# Patient Record
Sex: FEMALE | Race: WHITE | NOT HISPANIC OR LATINO | Employment: OTHER | URBAN - METROPOLITAN AREA
[De-identification: names, ages, dates, MRNs, and addresses within clinical notes are randomized per-mention and may not be internally consistent; named-entity substitution may affect disease eponyms.]

---

## 2017-01-11 ENCOUNTER — OFFICE VISIT (OUTPATIENT)
Dept: NEUROSURGERY | Facility: CLINIC | Age: 57
End: 2017-01-11

## 2017-01-11 VITALS
DIASTOLIC BLOOD PRESSURE: 80 MMHG | SYSTOLIC BLOOD PRESSURE: 138 MMHG | TEMPERATURE: 97.6 F | HEART RATE: 88 BPM | BODY MASS INDEX: 25.84 KG/M2 | WEIGHT: 165 LBS

## 2017-01-11 DIAGNOSIS — M54.50 CHRONIC BILATERAL LOW BACK PAIN WITHOUT SCIATICA: ICD-10-CM

## 2017-01-11 DIAGNOSIS — M47.814 THORACIC SPONDYLOSIS WITHOUT MYELOPATHY: ICD-10-CM

## 2017-01-11 DIAGNOSIS — M54.2 CERVICALGIA: Primary | ICD-10-CM

## 2017-01-11 DIAGNOSIS — M47.812 CERVICAL SPONDYLOSIS WITHOUT MYELOPATHY: ICD-10-CM

## 2017-01-11 DIAGNOSIS — M54.6 PAIN IN THORACIC SPINE: ICD-10-CM

## 2017-01-11 DIAGNOSIS — G89.29 CHRONIC BILATERAL LOW BACK PAIN WITHOUT SCIATICA: ICD-10-CM

## 2017-01-11 DIAGNOSIS — M47.817 LUMBOSACRAL SPONDYLOSIS WITHOUT MYELOPATHY: ICD-10-CM

## 2017-01-11 DIAGNOSIS — M54.31 SCIATICA OF RIGHT SIDE: ICD-10-CM

## 2017-01-11 PROCEDURE — 99213 OFFICE O/P EST LOW 20 MIN: CPT | Performed by: NEUROLOGICAL SURGERY

## 2017-01-11 NOTE — LETTER
2017     Martinez Cameron MD  [address] [facility] [address]  [facility] [address]    Patient: Huma Montano   YOB: 1960   Date of Visit: 2017       Dear Dr. Rakesh MD:    Huma Montano was in my office today. Below is a copy of my note.    If you have questions, please do not hesitate to call me. I look forward to following Huma along with you.         Sincerely,        Connor Lopez MD        CC: No Recipients    Patient: Huma Montano  :  1960  Chart #:  [id number]    Date of Service: 17    Chief Complaint:   Chief Complaint   Patient presents with   • Back Pain       Back Pain   Chronicity: Patient returns today for a follow-up on her back pain. The current episode started more than 1 month ago. The problem occurs intermittently. The problem has been gradually improving since onset. The pain is present in the lumbar spine (She states that her sciatica pain has increased.). Quality: She has good saggital balance.  She states she keeps muscle spasms in her back. The pain does not radiate (She has occasional neck pain.  It use to be quite intense, however it has improved.). The pain is at a severity of 6/10. The pain is mild (Her pain is mild to moderate.). The pain is worse during the day. The symptoms are aggravated by bending (If she sits or stands for too long, her pain increases.). Stiffness is present in the morning. Associated symptoms include numbness. She has tried analgesics, NSAIDs and walking (She has been taking Gabapentin at bedtime.  She has been exercising at home.) for the symptoms. The treatment provided mild relief.     Her neck pain is about the same;  She is having more low back and R sciatic pain.  She continues meloxicam, gabapentin, tizanidine, and tramadol.  She is working restricted hours (32/week) which has helped.    Radiographic Images:   None recently    Past Medical History   Diagnosis Date   • Back pain    • Graves  disease      Current Outpatient Prescriptions   Medication Sig Dispense Refill   • amphetamine-dextroamphetamine XR (ADDERALL XR) 25 MG 24 hr capsule Take 25 mg by mouth every morning.     • aspirin 81 MG EC tablet Take 1 tablet by mouth daily. 30 tablet 0   • estradiol (CLIMARA) 0.075 MG/24HR patch Place 1 patch on the skin 1 (one) time per week.     • gabapentin (NEURONTIN) 300 MG capsule Take 1 capsule by mouth 3 (Three) Times a Day. 90 capsule 3   • levothyroxine (SYNTHROID, LEVOTHROID) 112 MCG tablet Take 112 mcg by mouth daily.     • meloxicam (MOBIC) 15 MG tablet Take 1 tablet by mouth Daily. With food 30 tablet 3   • morphine (MSIR) 15 MG tablet Take 15 mg by mouth Every 4 (Four) Hours As Needed for severe pain (7-10).     • tiZANidine (ZANAFLEX) 4 MG tablet Take 4 mg by mouth at night as needed for muscle spasms.     • traMADol (ULTRAM) 50 MG tablet Take 50 mg by mouth every 6 (six) hours as needed for moderate pain (4-6).       No current facility-administered medications for this visit.      Social History     Social History   • Marital status:      Spouse name: N/A   • Number of children: N/A   • Years of education: N/A     Social History Main Topics   • Smoking status: Never Smoker   • Smokeless tobacco: Not on file   • Alcohol use No   • Drug use: No   • Sexual activity: Not on file     Other Topics Concern   • Not on file     Social History Narrative     No family history on file.  Past Surgical History   Procedure Laterality Date   • Hernia repair     • Rotator cuff repair       3x   • Cyst removal       Breast   • Cyst removal       Cervical cyst   • Septoplasty     • Spinal fusion     • X-stop implantation     • Breast biopsy Right 01/2016     Review of Systems   Respiratory: Positive for chest tightness.    Musculoskeletal: Positive for arthralgias, back pain, gait problem, myalgias, neck pain and neck stiffness.   Allergic/Immunologic: Positive for environmental allergies.   Neurological:  Positive for numbness.   All other systems reviewed and are negative.    Vitals:    01/11/17 0808   BP: 138/80   Pulse: 88   Temp: 97.6 °F (36.4 °C)     Physical Exam  Neurologic Exam  Constitutional: She is oriented to person, place, and time. Vital signs are normal. She appears well-developed and well-nourished. No distress.   Neat healthy female   Neck: Trachea normal and normal range of motion. No thyroid mass present.   Minimal neck stiffness; No Spurling's or L'Hermitte's signs; Shoulder ROM normal   Musculoskeletal:   Thoracic back: She exhibits pain. She exhibits no tenderness and no deformity.   Lumbar back: She exhibits pain. She exhibits normal range of motion, no deformity and no spasm.   Healed lumbar incision; Mild stiffness; SLR negative; Hip ROM normal    Mental Status   Oriented to person, place, and time.   Attention: normal. Concentration: normal.   Speech: speech is normal   Level of consciousness: alert  Knowledge: good and consistent with education.   Normal comprehension.       Cranial Nerves   Cranial nerves II through XII intact.       Motor Exam   Muscle bulk: normal  Overall muscle tone: normal      Strength   Strength 5/5 throughout.       Sensory Exam   Light touch normal.   Proprioception normal.       Gait, Coordination, and Reflexes       Gait  Gait: normal    Tremor   Resting tremor: absent  Intention tremor: absent  Action tremor: absent      Reflexes   Right biceps: 1+  Left biceps: 1+  Right triceps: 1+  Left triceps: 1+  Right patellar: 2+  Left patellar: 2+  Right achilles: 1+  Left achilles: 1+  Right Scherer: absent  Left Scherer: absent  Right ankle clonus: absent  Left ankle clonus: absent  LATASHA normal; Right handed      Huma was seen today for back pain.    Diagnoses and all orders for this visit:    Cervicalgia  -     MRI Lumbar Spine Without Contrast; Future    Pain in thoracic spine  -     MRI Lumbar Spine Without Contrast; Future    Cervical spondylosis without  myelopathy  -     MRI Lumbar Spine Without Contrast; Future    Thoracic spondylosis without myelopathy  -     MRI Lumbar Spine Without Contrast; Future    Chronic bilateral low back pain without sciatica  -     MRI Lumbar Spine Without Contrast; Future    Lumbosacral spondylosis without myelopathy  -     MRI Lumbar Spine Without Contrast; Future    Sciatica of right side  -     MRI Lumbar Spine Without Contrast; Future      Plan: order lumbar spine MRI without contrast;  Continue current medications;  Continue work hour restrictions;  Monitor sympptoms for review at next visit after MRI.    Connor Lopez MD

## 2017-01-11 NOTE — PROGRESS NOTES
Patient: Huma Montano  :  1960  Chart #:  [id number]    Date of Service: 17    Chief Complaint:   Chief Complaint   Patient presents with   • Back Pain       Back Pain   Chronicity: Patient returns today for a follow-up on her back pain. The current episode started more than 1 month ago. The problem occurs intermittently. The problem has been gradually improving since onset. The pain is present in the lumbar spine (She states that her sciatica pain has increased.). Quality: She has good saggital balance.  She states she keeps muscle spasms in her back. The pain does not radiate (She has occasional neck pain.  It use to be quite intense, however it has improved.). The pain is at a severity of 6/10. The pain is mild (Her pain is mild to moderate.). The pain is worse during the day. The symptoms are aggravated by bending (If she sits or stands for too long, her pain increases.). Stiffness is present in the morning. Associated symptoms include numbness. She has tried analgesics, NSAIDs and walking (She has been taking Gabapentin at bedtime.  She has been exercising at home.) for the symptoms. The treatment provided mild relief.     Her neck pain is about the same;  She is having more low back and R sciatic pain.  She continues meloxicam, gabapentin, tizanidine, and tramadol.  She is working restricted hours (32/week) which has helped.    Radiographic Images:   None recently    Past Medical History   Diagnosis Date   • Back pain    • Graves disease      Current Outpatient Prescriptions   Medication Sig Dispense Refill   • amphetamine-dextroamphetamine XR (ADDERALL XR) 25 MG 24 hr capsule Take 25 mg by mouth every morning.     • aspirin 81 MG EC tablet Take 1 tablet by mouth daily. 30 tablet 0   • estradiol (CLIMARA) 0.075 MG/24HR patch Place 1 patch on the skin 1 (one) time per week.     • gabapentin (NEURONTIN) 300 MG capsule Take 1 capsule by mouth 3 (Three) Times a Day. 90 capsule 3   • levothyroxine  (SYNTHROID, LEVOTHROID) 112 MCG tablet Take 112 mcg by mouth daily.     • meloxicam (MOBIC) 15 MG tablet Take 1 tablet by mouth Daily. With food 30 tablet 3   • morphine (MSIR) 15 MG tablet Take 15 mg by mouth Every 4 (Four) Hours As Needed for severe pain (7-10).     • tiZANidine (ZANAFLEX) 4 MG tablet Take 4 mg by mouth at night as needed for muscle spasms.     • traMADol (ULTRAM) 50 MG tablet Take 50 mg by mouth every 6 (six) hours as needed for moderate pain (4-6).       No current facility-administered medications for this visit.      Social History     Social History   • Marital status:      Spouse name: N/A   • Number of children: N/A   • Years of education: N/A     Social History Main Topics   • Smoking status: Never Smoker   • Smokeless tobacco: Not on file   • Alcohol use No   • Drug use: No   • Sexual activity: Not on file     Other Topics Concern   • Not on file     Social History Narrative     No family history on file.  Past Surgical History   Procedure Laterality Date   • Hernia repair     • Rotator cuff repair       3x   • Cyst removal       Breast   • Cyst removal       Cervical cyst   • Septoplasty     • Spinal fusion     • X-stop implantation     • Breast biopsy Right 01/2016     Review of Systems   Respiratory: Positive for chest tightness.    Musculoskeletal: Positive for arthralgias, back pain, gait problem, myalgias, neck pain and neck stiffness.   Allergic/Immunologic: Positive for environmental allergies.   Neurological: Positive for numbness.   All other systems reviewed and are negative.    Vitals:    01/11/17 0808   BP: 138/80   Pulse: 88   Temp: 97.6 °F (36.4 °C)     Physical Exam  Neurologic Exam  Constitutional: She is oriented to person, place, and time. Vital signs are normal. She appears well-developed and well-nourished. No distress.   Neat healthy female   Neck: Trachea normal and normal range of motion. No thyroid mass present.   Minimal neck stiffness; No Spurling's or  L'Hermitte's signs; Shoulder ROM normal   Musculoskeletal:   Thoracic back: She exhibits pain. She exhibits no tenderness and no deformity.   Lumbar back: She exhibits pain. She exhibits normal range of motion, no deformity and no spasm.   Healed lumbar incision; Mild stiffness; SLR negative; Hip ROM normal    Mental Status   Oriented to person, place, and time.   Attention: normal. Concentration: normal.   Speech: speech is normal   Level of consciousness: alert  Knowledge: good and consistent with education.   Normal comprehension.       Cranial Nerves   Cranial nerves II through XII intact.       Motor Exam   Muscle bulk: normal  Overall muscle tone: normal      Strength   Strength 5/5 throughout.       Sensory Exam   Light touch normal.   Proprioception normal.       Gait, Coordination, and Reflexes       Gait  Gait: normal    Tremor   Resting tremor: absent  Intention tremor: absent  Action tremor: absent      Reflexes   Right biceps: 1+  Left biceps: 1+  Right triceps: 1+  Left triceps: 1+  Right patellar: 2+  Left patellar: 2+  Right achilles: 1+  Left achilles: 1+  Right Scherer: absent  Left Scherer: absent  Right ankle clonus: absent  Left ankle clonus: absent  LATASHA normal; Right handed      Huma was seen today for back pain.    Diagnoses and all orders for this visit:    Cervicalgia  -     MRI Lumbar Spine Without Contrast; Future    Pain in thoracic spine  -     MRI Lumbar Spine Without Contrast; Future    Cervical spondylosis without myelopathy  -     MRI Lumbar Spine Without Contrast; Future    Thoracic spondylosis without myelopathy  -     MRI Lumbar Spine Without Contrast; Future    Chronic bilateral low back pain without sciatica  -     MRI Lumbar Spine Without Contrast; Future    Lumbosacral spondylosis without myelopathy  -     MRI Lumbar Spine Without Contrast; Future    Sciatica of right side  -     MRI Lumbar Spine Without Contrast; Future      Plan: order lumbar spine MRI without contrast;   Continue current medications;  Continue work hour restrictions;  Monitor sympptoms for review at next visit after MRI.    Connor Lopez MD

## 2017-01-11 NOTE — MR AVS SNAPSHOT
Huma Montano   1/11/2017 8:00 AM   Office Visit    Dept Phone:  [phone]   Encounter #:  [id number]    Provider:  Connor Lopez MD   Department:  [facility]                Your Full Care Plan              Your Updated Medication List          This list is accurate as of: 1/11/17  8:24 AM.  Always use your most recent med list.                ADDERALL XR 25 MG 24 hr capsule   Generic drug:  amphetamine-dextroamphetamine XR       aspirin 81 MG EC tablet   Take 1 tablet by mouth daily.       estradiol 0.075 MG/24HR patch   Commonly known as:  CLIMARA       gabapentin 300 MG capsule   Commonly known as:  NEURONTIN   Take 1 capsule by mouth 3 (Three) Times a Day.       levothyroxine 112 MCG tablet   Commonly known as:  SYNTHROID, LEVOTHROID       meloxicam 15 MG tablet   Commonly known as:  MOBIC   Take 1 tablet by mouth Daily. With food       Morphine 15 MG tablet   Commonly known as:  MSIR       tiZANidine 4 MG tablet   Commonly known as:  ZANAFLEX       traMADol 50 MG tablet   Commonly known as:  ULTRAM               You Were Diagnosed With        Codes Comments    Cervicalgia    -  Primary ICD-10-CM: M54.2  ICD-9-CM: 723.1     Pain in thoracic spine     ICD-10-CM: M54.6  ICD-9-CM: 724.1     Cervical spondylosis without myelopathy     ICD-10-CM: M47.812  ICD-9-CM: 721.0     Thoracic spondylosis without myelopathy     ICD-10-CM: M47.814  ICD-9-CM: 721.2     Chronic bilateral low back pain without sciatica     ICD-10-CM: M54.5, G89.29  ICD-9-CM: 724.2, 338.29     Lumbosacral spondylosis without myelopathy     ICD-10-CM: M47.817  ICD-9-CM: 721.3     Sciatica of right side     ICD-10-CM: M54.31  ICD-9-CM: 724.3       Instructions     None    Patient Instructions History      Upcoming Appointments     Visit Type Date Time Department    OFFICE VISIT 1/11/2017  8:00 AM [facility]    OFFICE VISIT 2/10/2017  8:40 AM [facility]         [employer] Signup     Our records indicate that you have an active [facility] [employer] account.    You can view your After Visit Summary by going to [employer] and logging in with your [employer]ing [employer] username and password.  If you don't have a [employer] username and password but a parent or guardian has access to your record, the parent or guardian should login with their own [employer] username and password and access your record to view the After Visit Summary.    If you have questions, you can email [email or web address] or call [phone] to talk to our [employer] staff.  Remember, T[employer]ing T[employer] is NOT to be used for urgent needs.  For medical emergencies, dial 911.               Other Info from Your Visit           Your Appointments     Feb 10, 2017  8:40 AM EST   Office Visit with Connor Lopez MD   [facility] (--)    [address] ,  [address] [address] [phone]           Arrive 15 minutes prior to appointment.              Allergies     Erythromycin      Lortab [Hydrocodone-acetaminophen]        Reason for Visit     Back Pain           Vital Signs     Blood Pressure Pulse Temperature Weight Body Mass Index Smoking Status    138/80 (BP Location: Right arm) 88 97.6 °F (36.4 °C) (Temporal Artery ) 165 lb (74.8 kg) 25.84 kg/m2 Never Smoker      Problems and Diagnoses Noted     Neck pain    -  Primary    Pain in thoracic spine        Degenerative arthritis of cervical spine        Arthritis of spine        Chronic bilateral low back pain without sciatica        Arthritis of low back        Right sided sciatica

## 2017-02-10 ENCOUNTER — OFFICE VISIT (OUTPATIENT)
Dept: NEUROSURGERY | Facility: CLINIC | Age: 57
End: 2017-02-10

## 2017-02-10 VITALS
TEMPERATURE: 97.8 F | WEIGHT: 170 LBS | HEIGHT: 66 IN | BODY MASS INDEX: 27.32 KG/M2 | SYSTOLIC BLOOD PRESSURE: 126 MMHG | DIASTOLIC BLOOD PRESSURE: 84 MMHG

## 2017-02-10 DIAGNOSIS — M54.2 CERVICALGIA: Primary | ICD-10-CM

## 2017-02-10 DIAGNOSIS — G89.29 CHRONIC BILATERAL LOW BACK PAIN WITH RIGHT-SIDED SCIATICA: ICD-10-CM

## 2017-02-10 DIAGNOSIS — M54.31 SCIATICA OF RIGHT SIDE: ICD-10-CM

## 2017-02-10 DIAGNOSIS — M41.20 SCOLIOSIS (AND KYPHOSCOLIOSIS), IDIOPATHIC: ICD-10-CM

## 2017-02-10 DIAGNOSIS — M47.812 CERVICAL SPONDYLOSIS WITHOUT MYELOPATHY: ICD-10-CM

## 2017-02-10 DIAGNOSIS — M47.814 THORACIC SPONDYLOSIS WITHOUT MYELOPATHY: ICD-10-CM

## 2017-02-10 DIAGNOSIS — M54.6 PAIN IN THORACIC SPINE: ICD-10-CM

## 2017-02-10 DIAGNOSIS — M47.817 LUMBOSACRAL SPONDYLOSIS WITHOUT MYELOPATHY: ICD-10-CM

## 2017-02-10 DIAGNOSIS — M54.41 CHRONIC BILATERAL LOW BACK PAIN WITH RIGHT-SIDED SCIATICA: ICD-10-CM

## 2017-02-10 PROCEDURE — 99214 OFFICE O/P EST MOD 30 MIN: CPT | Performed by: NEUROLOGICAL SURGERY

## 2017-02-10 NOTE — PROGRESS NOTES
Patient: Huma Montano  :  1960  Chart #:  [id number]    Date of Service: 02/10/17    Chief Complaint:   Chief Complaint   Patient presents with   • Back Pain     MRI       Back Pain   Chronicity: Patient returns today for a follow-up on her back pain. Episode onset: In  she underwent a interbody fusion with cages at L5-S1. The problem occurs intermittently (She continues to have muscle spasms in her back.). The problem has been gradually improving since onset. The pain is present in the lumbar spine (She has incresase sciatica radha.). Radiates to: She has numbness and tingling in her metatarsals. The pain is at a severity of 6/10. The pain is mild (Her pain is mild to moderate.). The pain is worse during the day (She states that it is difficult to get her morning started because of her back pain.). The symptoms are aggravated by twisting and standing. Stiffness is present in the morning. Associated symptoms include chest pain, numbness and weakness. She has tried ice, heat, home exercises and bed rest for the symptoms. The treatment provided moderate relief.     She is worse with low back pain especially on R side and R sciatica;  There are no other new symptoms or complaints since last visit on 17.    Radiographic Images:   Lumbar MRI done 17 was reviewed with the patient:  She has a transitional L5 vertebra;  There is lateral recess stenosis at L5S1 more on R than L.  There is scoliosis due to degenerative discs at L2L3 and L3L4.  She has interbody fusion at L4L5.      Past Medical History   Diagnosis Date   • Back pain    • Graves disease      Current Outpatient Prescriptions   Medication Sig Dispense Refill   • amphetamine-dextroamphetamine XR (ADDERALL XR) 25 MG 24 hr capsule Take 25 mg by mouth every morning.     • aspirin 81 MG EC tablet Take 1 tablet by mouth daily. 30 tablet 0   • gabapentin (NEURONTIN) 300 MG capsule Take 1 capsule by mouth 3 (Three) Times a Day. 90 capsule 3   •  levothyroxine (SYNTHROID, LEVOTHROID) 112 MCG tablet Take 112 mcg by mouth daily.     • meloxicam (MOBIC) 15 MG tablet Take 1 tablet by mouth Daily. With food 30 tablet 3   • morphine (MSIR) 15 MG tablet Take 15 mg by mouth Every 4 (Four) Hours As Needed for severe pain (7-10).     • tiZANidine (ZANAFLEX) 4 MG tablet Take 4 mg by mouth at night as needed for muscle spasms.     • traMADol (ULTRAM) 50 MG tablet Take 50 mg by mouth every 6 (six) hours as needed for moderate pain (4-6).     • estradiol (CLIMARA) 0.075 MG/24HR patch Place 1 patch on the skin 1 (one) time per week.       No current facility-administered medications for this visit.      Social History     Social History   • Marital status:      Spouse name: N/A   • Number of children: N/A   • Years of education: N/A     Social History Main Topics   • Smoking status: Never Smoker   • Smokeless tobacco: None   • Alcohol use No   • Drug use: No   • Sexual activity: Not Asked     Other Topics Concern   • None     Social History Narrative     History reviewed. No pertinent family history.  Past Surgical History   Procedure Laterality Date   • Hernia repair     • Rotator cuff repair       3x   • Cyst removal       Breast   • Cyst removal       Cervical cyst   • Septoplasty     • Spinal fusion     • X-stop implantation     • Breast biopsy Right 01/2016     Review of Systems   Constitutional: Positive for activity change and fatigue.   HENT: Positive for congestion.    Respiratory: Positive for chest tightness.    Cardiovascular: Positive for chest pain.   Musculoskeletal: Positive for arthralgias, back pain, myalgias, neck pain and neck stiffness.   Allergic/Immunologic: Positive for environmental allergies.   Neurological: Positive for weakness and numbness.   All other systems reviewed and are negative.    Vitals:    02/10/17 0826   BP: 126/84   BP Location: Right arm   Patient Position: Sitting   Cuff Size: Adult   Temp: 97.8 °F (36.6 °C)   TempSrc:  "Temporal Artery    Weight: 170 lb (77.1 kg)   Height: 66\" (167.6 cm)     Physical Exam  Neurologic Exam  Constitutional: She is oriented to person, place, and time. Vital signs are normal. She appears well-developed and well-nourished. No distress.   Neat healthy female   Neck: Trachea normal and normal range of motion. No thyroid mass present.   Minimal neck stiffness; No Spurling's or L'Hermitte's signs; Shoulder ROM normal   Musculoskeletal:   Thoracic back: She exhibits pain. She exhibits no tenderness and no deformity.   Lumbar back: She exhibits pain. She exhibits normal range of motion, no deformity and no spasm.   Healed lumbar incision; Mild stiffness; SLR negative; Hip ROM normal    Mental Status   Oriented to person, place, and time.   Attention: normal. Concentration: normal.   Speech: speech is normal   Level of consciousness: alert  Knowledge: good and consistent with education.   Normal comprehension.       Cranial Nerves   Cranial nerves II through XII intact.       Motor Exam   Muscle bulk: normal  Overall muscle tone: normal      Strength   Strength 5/5 throughout.       Sensory Exam   Light touch normal.   Proprioception normal.       Gait, Coordination, and Reflexes       Gait  Gait: normal     Tremor   Resting tremor: absent  Intention tremor: absent  Action tremor: absent      Reflexes   Right biceps: 1+  Left biceps: 1+  Right triceps: 1+  Left triceps: 1+  Right patellar: 2+  Left patellar: 2+  Right achilles: 1+  Left achilles: 1+  Right Scherer: absent  Left Scherer: absent  Right ankle clonus: absent  Left ankle clonus: absent  LATASHA normal; Right handed       Huma was seen today for back pain.    Diagnoses and all orders for this visit:    Cervicalgia    Chronic bilateral low back pain with right-sided sciatica    Pain in thoracic spine    Lumbosacral spondylosis without myelopathy    Cervical spondylosis without myelopathy    Sciatica of right side    Thoracic spondylosis without " myelopathy    Scoliosis (and kyphoscoliosis), idiopathic        Plan: I recommend reconstructive surgery to correct the degenerative scoliosis and relieve nerve root compression.  I recommend L L2L3 and R L3L4 TLIFs, L5S1 laminectomy and L2-S1 posterior fusion with pedicle screw fixation.      I described the operative procedure in detail as well as the indications, alternatives, and expected postoperative course and results. I described the potential risks and complications of surgery in detail. All questions were answered. The patient has indicated understanding of the planned procedure, accepted the risks, and wishes to proceed with surgery. No guarantee of results was given to the patient. The operative permit will be completed prior to surgery.    The patient's imaging CD's are on the chart.      Connor Lopez MD

## 2017-03-06 RX ORDER — MELOXICAM 15 MG/1
TABLET ORAL
Qty: 30 TABLET | Refills: 2 | Status: SHIPPED | OUTPATIENT
Start: 2017-03-06 | End: 2017-03-26

## 2017-03-06 NOTE — TELEPHONE ENCOUNTER
Provider:  John  Caller:   interface  Phone #:  automated  Surgery:  n/a  Surgery Date:  N/a  Last visit:   2/10/17    NIKOLE:         Reason for call:       Automated refill request

## 2017-03-10 ENCOUNTER — PREP FOR SURGERY (OUTPATIENT)
Dept: NEUROSURGERY | Facility: CLINIC | Age: 57
End: 2017-03-10

## 2017-03-10 DIAGNOSIS — M41.20 SCOLIOSIS (AND KYPHOSCOLIOSIS), IDIOPATHIC: Primary | ICD-10-CM

## 2017-03-21 ENCOUNTER — TELEPHONE (OUTPATIENT)
Dept: NEUROSURGERY | Facility: CLINIC | Age: 57
End: 2017-03-21

## 2017-03-21 NOTE — TELEPHONE ENCOUNTER
Provider:  John  Caller: Huma Montano  Time of call:   1:42  Phone #:  [phone]  Surgery:  l5-S1 sp osteotomy and tlif, l3-4  l2-3 tlif, l2-s1 posterior fusion and psf  Surgery Date:  Scheduled for 04/04/17  Last visit:   02/10/17    Reason for call:           ----- Message from Emma Gomez sent at 3/21/2017  1:42 PM EDT -----  Contact: [phone]    PATIENT CALLING REQUESTING A NOTE FOR HER EMPLOYER AS SHE IS UNABLE TO WORK ANY FURTHER.  PATIENT IS SCHEDULED FOR SURGERY 4/4/17.  SHE NEEDS THE NOTE TO BE EFFECTIVE AS OF TODAY.    SEND TO LIANA DE LEÓN - [phone] FAX NUMBER.

## 2017-03-21 NOTE — TELEPHONE ENCOUNTER
Called pt back, first fax was unsuccessful so I re-faxed, failed again. Pt gave me the fax number, it was slightly different from the original message: [phone]     Adv pt that I would re-fax tonight. She asked that I also mail her a copy of the letter, verified address and adv that I would mail her a copy as well, pt understood, no further questions

## 2017-03-26 ENCOUNTER — APPOINTMENT (OUTPATIENT)
Dept: PREADMISSION TESTING | Facility: HOSPITAL | Age: 57
End: 2017-03-26

## 2017-03-26 VITALS — WEIGHT: 169.75 LBS | HEIGHT: 67 IN | BODY MASS INDEX: 26.64 KG/M2

## 2017-03-26 DIAGNOSIS — M41.20 SCOLIOSIS (AND KYPHOSCOLIOSIS), IDIOPATHIC: ICD-10-CM

## 2017-03-26 LAB
DEPRECATED RDW RBC AUTO: 43.2 FL (ref 37–54)
ERYTHROCYTE [DISTWIDTH] IN BLOOD BY AUTOMATED COUNT: 12.2 % (ref 11.3–14.5)
GLUCOSE BLD-MCNC: 94 MG/DL (ref 70–100)
HBA1C MFR BLD: 5.9 % (ref 4.8–5.6)
HCT VFR BLD AUTO: 44.7 % (ref 34.5–44)
HGB BLD-MCNC: 15 G/DL (ref 11.5–15.5)
MCH RBC QN AUTO: 32.9 PG (ref 27–31)
MCHC RBC AUTO-ENTMCNC: 33.6 G/DL (ref 32–36)
MCV RBC AUTO: 98 FL (ref 80–99)
PLATELET # BLD AUTO: 291 10*3/MM3 (ref 150–450)
PMV BLD AUTO: 9.7 FL (ref 6–12)
RBC # BLD AUTO: 4.56 10*6/MM3 (ref 3.89–5.14)
WBC NRBC COR # BLD: 6.22 10*3/MM3 (ref 3.5–10.8)

## 2017-03-26 PROCEDURE — 85027 COMPLETE CBC AUTOMATED: CPT | Performed by: NEUROLOGICAL SURGERY

## 2017-03-26 PROCEDURE — 82947 ASSAY GLUCOSE BLOOD QUANT: CPT | Performed by: ANESTHESIOLOGY

## 2017-03-26 PROCEDURE — 87081 CULTURE SCREEN ONLY: CPT | Performed by: NEUROLOGICAL SURGERY

## 2017-03-26 PROCEDURE — 36415 COLL VENOUS BLD VENIPUNCTURE: CPT

## 2017-03-26 PROCEDURE — 87147 CULTURE TYPE IMMUNOLOGIC: CPT | Performed by: NEUROLOGICAL SURGERY

## 2017-03-26 PROCEDURE — 83036 HEMOGLOBIN GLYCOSYLATED A1C: CPT | Performed by: NEUROLOGICAL SURGERY

## 2017-03-26 RX ORDER — ESTRADIOL 0.05 MG/D
1 FILM, EXTENDED RELEASE TRANSDERMAL 2 TIMES WEEKLY
COMMUNITY
End: 2018-02-20 | Stop reason: CLARIF

## 2017-03-26 RX ORDER — DULOXETIN HYDROCHLORIDE 60 MG/1
60 CAPSULE, DELAYED RELEASE ORAL DAILY
COMMUNITY
End: 2017-09-13

## 2017-03-26 RX ORDER — ONDANSETRON 4 MG/1
4 TABLET, FILM COATED ORAL EVERY 4 HOURS PRN
COMMUNITY
End: 2022-08-10 | Stop reason: SDUPTHER

## 2017-03-26 RX ORDER — MELOXICAM 15 MG/1
15 TABLET ORAL DAILY
COMMUNITY
End: 2018-02-20 | Stop reason: SDUPTHER

## 2017-03-26 NOTE — DISCHARGE INSTRUCTIONS
The following information and instructions were given:    NPO after MN except sips of water with routine prescribed medication (except blood thinner, diabetes, or weight reducing medication) unless otherwise instructed by your physician.  Do not eat, drink, smoke or chew gum after MN the night before surgery. This also includes no mints.    DO NOT shave, wear makeup or dark nail polish.    Remove all jewelry (advised to go to jeweler if unable to remove).    Leave anything you consider valuable at home.    Leave your suitcase in the car until after your surgery.    Bring the following with you (if applicable)   -picture ID and insurance cards   -Co-pay/deductible required by insurance   -Medications in the original bottles (not a list) including all over-the-counter  medications if not brought to PAT   -Copy of advance directive, living will or power of  documents if not  brought to PAT   -CPAP or BIPAP mask and tubing (do not bring machine)   -Skin prep instructions sheet   -PAT Pass   Education booklet, brochure, handout or binder given to patient.    Pain Control After Surgery handout given to patient.    Respirex use (handout given to patient) and pneumonia prevention.    Signs and Symptoms of infection.    DVT Prevention stressing the importance of ambulation.    Patient to apply Chlorhexadine wipes to surgical area (as instructed) the night before procedure and the AM of procedure.        Bactroban and Chlorhexidine Prescription given during PAT visit, as well as written and verbal instructions given to patient during PAT visit.

## 2017-03-28 LAB — MRSA SPEC QL CULT: ABNORMAL

## 2017-03-28 RX ORDER — VANCOMYCIN HYDROCHLORIDE
15 ONCE
Status: DISCONTINUED | OUTPATIENT
Start: 2017-03-28 | End: 2017-04-04 | Stop reason: SDUPTHER

## 2017-03-29 ENCOUNTER — TELEPHONE (OUTPATIENT)
Dept: NEUROSURGERY | Facility: CLINIC | Age: 57
End: 2017-03-29

## 2017-04-03 NOTE — H&P
CHIEF COMPLAINT: Low back and leg pain.     HISTORY OF PRESENT ILLNESS: This 56-year-old woman has a long history of back problems. Currently, she is having severe lower back pain with radiation into the right leg. She has been using meloxicam, gabapentin, tizanidine, and tramadol. 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 She has previously had an L4-L5 posterior fusion for degenerative spondylolisthesis. She has had extensive physical therapy and conservative treatment. She has gradually worsened.     Imaging studies of the spine show a transitional L5 vertebra. There is lateral recess stenosis at L5-S1, more on the right than the left, and a scoliosis with disk degeneration at L2-L3 and L3-L4. She has had an interbody fusion at L4-L5.     PAST MEDICAL HISTORY:  1.  Degenerative osteoarthritis and back pain.  2.  Graves' disease.     MEDICATIONS:  1.  Adderall 25 mg daily.   2.  Aspirin 81 mg daily.   3.  Neurontin 300 mg 3 daily.   4.  Synthroid 112 mcg daily.   5.  Meloxicam 15 mg daily.   6.  MS Contin 15 mg.   7.  Tizanidine 4 mg p.r.n.   8.  Tramadol 50 mg q.6 h. p.r.n.   9.  Estradiol patch.     ALLERGIES:  1.  ERYTHROMYCIN.  2.  HYDROCODONE.     PAST SURGICAL HISTORY:  1.  Hernia repair.   2.  Rotator cuff repair in the shoulder.    3.  Breast cyst removal.    4.  Cervical cyst removal.    5.  Septoplasty.    6.  L4-L5 posterior lumbar fusion.    7.  X-Stop implantation.   8.  Breast biopsy.     SOCIAL HISTORY: The patient is . She has never smoked cigarettes. She denies alcohol or illicit drug use.     FAMILY HISTORY: There is no history of hereditary or congenital disease.     REVIEW OF SYSTEMS: The patient denies a history of recent chest pain or heart attack, other heart disease, hypertension, stroke, seizures, cancer, chronic pulmonary disease, diabetes mellitus, liver, kidney, stomach, or ulcer disease, psychiatric disease, or a history of bleeding disorder.      PHYSICAL EXAMINATION:  VITAL SIGNS: The patient is 5 feet 6 inches tall and weighs 170 pounds. Temperature is 97.8°F and blood pressure is 126/84.   GENERAL: The patient is a neat, healthy female, and in mild distress due to pain.   HEENT: Unremarkable.   NECK:  Normal range of motion. There was minimal neck stiffness. There was no Spurling or Lhermitte sign. Shoulder range of motion was normal.   CHEST: Symmetric with normal expansion on inspiration.   LUNGS: Clear to auscultation in all fields.   HEART: Regular rhythm with normal S1 and S2 sounds. No murmur was detected.   ABDOMEN: Soft and nontender. Bowel sounds are present.   BACK: Normal range of motion in the lumbar region. She had a healed lumbar incision. She had mild to moderate stiffness.   NEUROLOGIC: Mental status, speech, and language are normal. Cranial nerves II-XII are intact. Motor testing showed normal strength and tone in upper and lower extremities. Sensation to light touch and joint position were normal. The patient's gait was normal. Balance and coordination were intact. Deep tendon reflexes were diminished at the biceps and triceps, normal at the knees, and diminished at the ankles. There were no pathologic reflexes noted.   EXTREMITIES: There was no atrophy or edema noted. Straight leg raising was negative. Hip range of motion was normal.     IMPRESSION:   1.  Degenerative lumbar scoliosis.   2.  Spinal stenosis with intractable back and leg pain.     PLAN: The patient is admitted for a Rios-Clement osteotomy at L5-S1 with interbody fusions at L2-L3, L3-L4, L5-S1, and L2 to S1 posterior lateral fusion with pedicle screw fixation in an attempt to correct deformity, stabilize the spine, decompress nerve roots, and relieve back and leg pain.         Connor Lopez MD  BAS/mildred  DD: 04/03/2017 07:05:06  DT: 04/03/2017 08:36:19  Voice Rec. ID #[id number]  Voice Original ID #[id number]  Doc ID #[id number]  Rev. #0  cc:

## 2017-04-04 ENCOUNTER — ANESTHESIA (OUTPATIENT)
Dept: PERIOP | Facility: HOSPITAL | Age: 57
End: 2017-04-04

## 2017-04-04 ENCOUNTER — APPOINTMENT (OUTPATIENT)
Dept: GENERAL RADIOLOGY | Facility: HOSPITAL | Age: 57
End: 2017-04-04

## 2017-04-04 ENCOUNTER — HOSPITAL ENCOUNTER (INPATIENT)
Facility: HOSPITAL | Age: 57
LOS: 4 days | Discharge: HOME | End: 2017-04-08
Attending: NEUROLOGICAL SURGERY | Admitting: NEUROLOGICAL SURGERY

## 2017-04-04 ENCOUNTER — ANESTHESIA EVENT (OUTPATIENT)
Dept: PERIOP | Facility: HOSPITAL | Age: 57
End: 2017-04-04

## 2017-04-04 DIAGNOSIS — Z74.09 IMPAIRED FUNCTIONAL MOBILITY, BALANCE, GAIT, AND ENDURANCE: Primary | ICD-10-CM

## 2017-04-04 DIAGNOSIS — M48.061 LUMBAR STENOSIS: ICD-10-CM

## 2017-04-04 DIAGNOSIS — M41.20 SCOLIOSIS (AND KYPHOSCOLIOSIS), IDIOPATHIC: ICD-10-CM

## 2017-04-04 PROBLEM — M41.9 SCOLIOSIS DEFORMITY OF SPINE: Status: ACTIVE | Noted: 2017-04-04

## 2017-04-04 LAB
ABO GROUP BLD: NORMAL
BASE EXCESS BLDA CALC-SCNC: -0.1 MMOL/L (ref 0–2)
BASE EXCESS BLDA CALC-SCNC: -1.8 MMOL/L (ref 0–2)
BASE EXCESS BLDA CALC-SCNC: -2.7 MMOL/L (ref 0–2)
BLD GP AB SCN SERPL QL: NEGATIVE
CA-I BLDA-SCNC: 1.11 MMOL/L (ref 1.12–1.3)
CA-I BLDA-SCNC: 1.12 MMOL/L (ref 1.12–1.3)
CA-I BLDA-SCNC: 1.13 MMOL/L (ref 1.12–1.3)
CHLORIDE BLDA-SCNC: 103 MMOL/L (ref 98–105)
CHLORIDE BLDA-SCNC: 105 MMOL/L (ref 98–105)
CHLORIDE BLDA-SCNC: 108 MMOL/L (ref 98–105)
CO2 BLDA-SCNC: 20.7 MMOL/L (ref 23–27)
CO2 BLDA-SCNC: 20.8 MMOL/L (ref 23–27)
CO2 BLDA-SCNC: 24 MMOL/L (ref 23–27)
GLUCOSE BLDA-MCNC: 114 MG/DL (ref 67–93)
GLUCOSE BLDA-MCNC: 142 MG/DL (ref 67–93)
GLUCOSE BLDA-MCNC: 174 MG/DL (ref 67–93)
GLUCOSE BLDC GLUCOMTR-MCNC: 112 MG/DL (ref 70–130)
HCO3 BLDA-SCNC: 19.9 MMOL/L (ref 20–26)
HCO3 BLDA-SCNC: 20 MMOL/L (ref 20–26)
HCO3 BLDA-SCNC: 23 MMOL/L (ref 20–26)
HCT VFR BLDA CALC: 36 % (ref 39–51)
HCT VFR BLDA CALC: 38 % (ref 39–51)
HCT VFR BLDA CALC: 45 % (ref 39–51)
HGB BLDA-MCNC: 12.1 G/DL (ref 10–16)
HGB BLDA-MCNC: 12.9 G/DL (ref 10–16)
HGB BLDA-MCNC: 15.4 G/DL (ref 10–16)
PCO2 BLDA: 25.7 MM HG (ref 34–46)
PCO2 BLDA: 28.8 MM HG (ref 34–46)
PCO2 BLDA: 33.3 MM HG (ref 34–46)
PH BLDA: 7.46 PH UNITS (ref 7.34–7.46)
PH BLDA: 7.46 PH UNITS (ref 7.34–7.46)
PH BLDA: 7.51 PH UNITS (ref 7.34–7.46)
PO2 BLDA: 157.8 MMHG (ref 79–99)
PO2 BLDA: 323.7 MMHG (ref 79–99)
PO2 BLDA: 414.2 MMHG (ref 79–99)
POTASSIUM BLDA-SCNC: 3.8 MMOL/L (ref 3.5–5.3)
POTASSIUM BLDA-SCNC: 3.94 MMOL/L (ref 3.5–5.3)
POTASSIUM BLDA-SCNC: 4.01 MMOL/L (ref 3.5–5.3)
RH BLD: POSITIVE
SAO2 % BLD: 99.3 % (ref 92–98)
SAO2 % BLD: 99.6 % (ref 92–98)
SAO2 % BLD: 99.7 % (ref 92–98)
SODIUM BLDA-SCNC: 132.9 MMOL/L (ref 134–146)
SODIUM BLDA-SCNC: 134.8 MMOL/L (ref 134–146)
SODIUM BLDA-SCNC: 135.8 MMOL/L (ref 134–146)

## 2017-04-04 PROCEDURE — C1713 ANCHOR/SCREW BN/BN,TIS/BN: HCPCS | Performed by: NEUROLOGICAL SURGERY

## 2017-04-04 PROCEDURE — 25010000002 FENTANYL CITRATE (PF) 100 MCG/2ML SOLUTION: Performed by: NURSE ANESTHETIST, CERTIFIED REGISTERED

## 2017-04-04 PROCEDURE — 22614 ARTHRD PST TQ 1NTRSPC EA ADD: CPT | Performed by: NEUROLOGICAL SURGERY

## 2017-04-04 PROCEDURE — 25010000002 HYDROMORPHONE PER 4 MG: Performed by: ANESTHESIOLOGY

## 2017-04-04 PROCEDURE — 86850 RBC ANTIBODY SCREEN: CPT | Performed by: NEUROLOGICAL SURGERY

## 2017-04-04 PROCEDURE — 82435 ASSAY OF BLOOD CHLORIDE: CPT | Performed by: NEUROLOGICAL SURGERY

## 2017-04-04 PROCEDURE — 22634 ARTHRD CMBN 1NTRSPC EA ADDL: CPT | Performed by: NEUROLOGICAL SURGERY

## 2017-04-04 PROCEDURE — 22853 INSJ BIOMECHANICAL DEVICE: CPT | Performed by: NEUROLOGICAL SURGERY

## 2017-04-04 PROCEDURE — 25010000002 ALBUMIN HUMAN 5% PER 50 ML: Performed by: NURSE ANESTHETIST, CERTIFIED REGISTERED

## 2017-04-04 PROCEDURE — 20936 SP BONE AGRFT LOCAL ADD-ON: CPT | Performed by: NEUROLOGICAL SURGERY

## 2017-04-04 PROCEDURE — 25010000002 DEXAMETHASONE PER 1 MG: Performed by: NURSE ANESTHETIST, CERTIFIED REGISTERED

## 2017-04-04 PROCEDURE — 72020 X-RAY EXAM OF SPINE 1 VIEW: CPT

## 2017-04-04 PROCEDURE — 85018 HEMOGLOBIN: CPT | Performed by: NEUROLOGICAL SURGERY

## 2017-04-04 PROCEDURE — 82330 ASSAY OF CALCIUM: CPT | Performed by: NEUROLOGICAL SURGERY

## 2017-04-04 PROCEDURE — 84132 ASSAY OF SERUM POTASSIUM: CPT | Performed by: NEUROLOGICAL SURGERY

## 2017-04-04 PROCEDURE — 25010000002 HEPARIN (PORCINE) PER 1000 UNITS

## 2017-04-04 PROCEDURE — 82805 BLOOD GASES W/O2 SATURATION: CPT | Performed by: NEUROLOGICAL SURGERY

## 2017-04-04 PROCEDURE — 0SG10AJ FUSION OF 2 OR MORE LUMBAR VERTEBRAL JOINTS WITH INTERBODY FUSION DEVICE, POSTERIOR APPROACH, ANTERIOR COLUMN, OPEN APPROACH: ICD-10-PCS | Performed by: NEUROLOGICAL SURGERY

## 2017-04-04 PROCEDURE — 86901 BLOOD TYPING SEROLOGIC RH(D): CPT | Performed by: NEUROLOGICAL SURGERY

## 2017-04-04 PROCEDURE — 25010000002 PROPOFOL 10 MG/ML EMULSION: Performed by: NURSE ANESTHETIST, CERTIFIED REGISTERED

## 2017-04-04 PROCEDURE — 86920 COMPATIBILITY TEST SPIN: CPT

## 2017-04-04 PROCEDURE — 25010000002 FENTANYL CITRATE (PF) 100 MCG/2ML SOLUTION: Performed by: ANESTHESIOLOGY

## 2017-04-04 PROCEDURE — 22633 ARTHRD CMBN 1NTRSPC LUMBAR: CPT | Performed by: NEUROLOGICAL SURGERY

## 2017-04-04 PROCEDURE — 84295 ASSAY OF SERUM SODIUM: CPT | Performed by: NEUROLOGICAL SURGERY

## 2017-04-04 PROCEDURE — 22214 INCIS 1 VERTEBRAL SEG LUMBAR: CPT | Performed by: NEUROLOGICAL SURGERY

## 2017-04-04 PROCEDURE — C2617 STENT, NON-COR, TEM W/O DEL: HCPCS | Performed by: NEUROLOGICAL SURGERY

## 2017-04-04 PROCEDURE — P9041 ALBUMIN (HUMAN),5%, 50ML: HCPCS | Performed by: NURSE ANESTHETIST, CERTIFIED REGISTERED

## 2017-04-04 PROCEDURE — 0SB20ZZ EXCISION OF LUMBAR VERTEBRAL DISC, OPEN APPROACH: ICD-10-PCS | Performed by: NEUROLOGICAL SURGERY

## 2017-04-04 PROCEDURE — 22842 INSERT SPINE FIXATION DEVICE: CPT | Performed by: NEUROLOGICAL SURGERY

## 2017-04-04 PROCEDURE — 0SB40ZZ EXCISION OF LUMBOSACRAL DISC, OPEN APPROACH: ICD-10-PCS | Performed by: NEUROLOGICAL SURGERY

## 2017-04-04 PROCEDURE — 0QU00JZ SUPPLEMENT LUMBAR VERTEBRA WITH SYNTHETIC SUBSTITUTE, OPEN APPROACH: ICD-10-PCS | Performed by: NEUROLOGICAL SURGERY

## 2017-04-04 PROCEDURE — 0SG30AJ FUSION OF LUMBOSACRAL JOINT WITH INTERBODY FUSION DEVICE, POSTERIOR APPROACH, ANTERIOR COLUMN, OPEN APPROACH: ICD-10-PCS | Performed by: NEUROLOGICAL SURGERY

## 2017-04-04 PROCEDURE — 25010000002 VANCOMYCIN HCL IN NACL 1.25-0.9 GM/250ML-% SOLUTION: Performed by: NEUROLOGICAL SURGERY

## 2017-04-04 PROCEDURE — 82962 GLUCOSE BLOOD TEST: CPT

## 2017-04-04 PROCEDURE — 25010000002 VANCOMYCIN PER 500 MG: Performed by: NEUROLOGICAL SURGERY

## 2017-04-04 PROCEDURE — 86900 BLOOD TYPING SEROLOGIC ABO: CPT | Performed by: NEUROLOGICAL SURGERY

## 2017-04-04 DEVICE — CROSSLINK 8115528 28TO30MM X10 MULTI
Type: IMPLANTABLE DEVICE | Site: SPINE LUMBAR | Status: FUNCTIONAL
Brand: CD HORIZON® SPINAL SYSTEM

## 2017-04-04 DEVICE — CONNECTOR 8351521 TSRH 3DX MEDIUM
Type: IMPLANTABLE DEVICE | Site: SPINE LUMBAR | Status: FUNCTIONAL
Brand: TSRH® SPINAL SYSTEM

## 2017-04-04 DEVICE — DBM T44150 10CC ORTHOBLEND SMALL DEFGRAF
Type: IMPLANTABLE DEVICE | Status: FUNCTIONAL
Brand: GRAFTON®AND GRAFTON PLUS®DEMINERALIZED BONE MATRIX (DBM)

## 2017-04-04 DEVICE — CONNECTOR 8351520 TSRH 3DX SMALL
Type: IMPLANTABLE DEVICE | Status: FUNCTIONAL
Brand: TSRH® SPINAL SYSTEM

## 2017-04-04 DEVICE — SCREW 83565457 TSRHOGTHINLONGMPA 6.5X45
Type: IMPLANTABLE DEVICE | Status: FUNCTIONAL
Brand: TSRH® SPINAL SYSTEM

## 2017-04-04 DEVICE — DURAGEN® PLUS DURAL REGENERATION MATRIX, 3 IN X 3 IN (7.5 CM X 7.5 CM)
Type: IMPLANTABLE DEVICE | Status: FUNCTIONAL
Brand: DURAGEN® PLUS

## 2017-04-04 DEVICE — SCREW 83575407 TSRHOGTHINLONGMPA 7.5X40
Type: IMPLANTABLE DEVICE | Status: FUNCTIONAL
Brand: TSRH® SPINAL SYSTEM

## 2017-04-04 DEVICE — BIOLOGIC 7600105 85 BTCP 15 HA 5CC VIAL
Type: IMPLANTABLE DEVICE | Status: FUNCTIONAL
Brand: MASTERGRAFT® GRANULES

## 2017-04-04 DEVICE — SCREW 83555507 TSRHOGTHINLONGMPA 5.5X50
Type: IMPLANTABLE DEVICE | Status: FUNCTIONAL
Brand: TSRH® SPINAL SYSTEM

## 2017-04-04 DEVICE — SPACER 2990826 CAPSTONE PEEK 08X26
Type: IMPLANTABLE DEVICE | Site: SPINE LUMBAR | Status: FUNCTIONAL
Brand: CAPSTONE® SPINAL SYSTEM

## 2017-04-04 DEVICE — SCREW 83575409 TSRH OG THIN 7.5MM X 40MM
Type: IMPLANTABLE DEVICE | Status: FUNCTIONAL
Brand: TSRH® SPINAL SYSTEM

## 2017-04-04 DEVICE — SCREW 83565507 TSRHOGTHINLONGMPA 6.5X50
Type: IMPLANTABLE DEVICE | Status: FUNCTIONAL
Brand: TSRH® SPINAL SYSTEM

## 2017-04-04 DEVICE — SPACER 2991026 CAPSTONE PEEK 10X26
Type: IMPLANTABLE DEVICE | Site: SPINE LUMBAR | Status: FUNCTIONAL
Brand: CAPSTONE® SPINAL SYSTEM

## 2017-04-04 RX ORDER — SODIUM CHLORIDE, SODIUM LACTATE, POTASSIUM CHLORIDE, CALCIUM CHLORIDE 600; 310; 30; 20 MG/100ML; MG/100ML; MG/100ML; MG/100ML
100 INJECTION, SOLUTION INTRAVENOUS CONTINUOUS
Status: DISCONTINUED | OUTPATIENT
Start: 2017-04-04 | End: 2017-04-06

## 2017-04-04 RX ORDER — NALOXONE HCL 0.4 MG/ML
0.4 VIAL (ML) INJECTION AS NEEDED
Status: DISCONTINUED | OUTPATIENT
Start: 2017-04-04 | End: 2017-04-04 | Stop reason: HOSPADM

## 2017-04-04 RX ORDER — FENTANYL CITRATE 50 UG/ML
50 INJECTION, SOLUTION INTRAMUSCULAR; INTRAVENOUS
Status: DISCONTINUED | OUTPATIENT
Start: 2017-04-04 | End: 2017-04-04 | Stop reason: HOSPADM

## 2017-04-04 RX ORDER — SODIUM CHLORIDE, SODIUM LACTATE, POTASSIUM CHLORIDE, CALCIUM CHLORIDE 600; 310; 30; 20 MG/100ML; MG/100ML; MG/100ML; MG/100ML
9 INJECTION, SOLUTION INTRAVENOUS CONTINUOUS PRN
Status: DISCONTINUED | OUTPATIENT
Start: 2017-04-04 | End: 2017-04-04 | Stop reason: HOSPADM

## 2017-04-04 RX ORDER — DEXAMETHASONE SODIUM PHOSPHATE 4 MG/ML
INJECTION, SOLUTION INTRA-ARTICULAR; INTRALESIONAL; INTRAMUSCULAR; INTRAVENOUS; SOFT TISSUE AS NEEDED
Status: DISCONTINUED | OUTPATIENT
Start: 2017-04-04 | End: 2017-04-04 | Stop reason: SURG

## 2017-04-04 RX ORDER — TIZANIDINE 4 MG/1
4 TABLET ORAL EVERY 8 HOURS PRN
Status: DISCONTINUED | OUTPATIENT
Start: 2017-04-04 | End: 2017-04-08 | Stop reason: HOSPADM

## 2017-04-04 RX ORDER — LIDOCAINE HYDROCHLORIDE 10 MG/ML
0.5 INJECTION, SOLUTION EPIDURAL; INFILTRATION; INTRACAUDAL; PERINEURAL ONCE AS NEEDED
Status: COMPLETED | OUTPATIENT
Start: 2017-04-04 | End: 2017-04-04

## 2017-04-04 RX ORDER — ALBUMIN, HUMAN INJ 5% 5 %
SOLUTION INTRAVENOUS CONTINUOUS PRN
Status: DISCONTINUED | OUTPATIENT
Start: 2017-04-04 | End: 2017-04-04 | Stop reason: SURG

## 2017-04-04 RX ORDER — DIPHENOXYLATE HYDROCHLORIDE AND ATROPINE SULFATE 2.5; .025 MG/1; MG/1
1 TABLET ORAL DAILY
Status: DISCONTINUED | OUTPATIENT
Start: 2017-04-05 | End: 2017-04-08 | Stop reason: HOSPADM

## 2017-04-04 RX ORDER — MAGNESIUM HYDROXIDE 1200 MG/15ML
LIQUID ORAL AS NEEDED
Status: DISCONTINUED | OUTPATIENT
Start: 2017-04-04 | End: 2017-04-04 | Stop reason: HOSPADM

## 2017-04-04 RX ORDER — BISACODYL 10 MG
10 SUPPOSITORY, RECTAL RECTAL DAILY PRN
Status: DISCONTINUED | OUTPATIENT
Start: 2017-04-04 | End: 2017-04-08 | Stop reason: HOSPADM

## 2017-04-04 RX ORDER — DIPHENHYDRAMINE HYDROCHLORIDE 50 MG/ML
25 INJECTION INTRAMUSCULAR; INTRAVENOUS EVERY 6 HOURS PRN
Status: DISCONTINUED | OUTPATIENT
Start: 2017-04-04 | End: 2017-04-08 | Stop reason: HOSPADM

## 2017-04-04 RX ORDER — DIPHENHYDRAMINE HCL 25 MG
25 CAPSULE ORAL NIGHTLY PRN
Status: DISCONTINUED | OUTPATIENT
Start: 2017-04-04 | End: 2017-04-08 | Stop reason: HOSPADM

## 2017-04-04 RX ORDER — PROPOFOL 10 MG/ML
VIAL (ML) INTRAVENOUS AS NEEDED
Status: DISCONTINUED | OUTPATIENT
Start: 2017-04-04 | End: 2017-04-04 | Stop reason: SURG

## 2017-04-04 RX ORDER — MORPHINE SULFATE 15 MG/1
15 TABLET, FILM COATED, EXTENDED RELEASE ORAL EVERY 12 HOURS SCHEDULED
Status: DISCONTINUED | OUTPATIENT
Start: 2017-04-04 | End: 2017-04-08 | Stop reason: HOSPADM

## 2017-04-04 RX ORDER — FENTANYL CITRATE 50 UG/ML
INJECTION, SOLUTION INTRAMUSCULAR; INTRAVENOUS AS NEEDED
Status: DISCONTINUED | OUTPATIENT
Start: 2017-04-04 | End: 2017-04-04 | Stop reason: SURG

## 2017-04-04 RX ORDER — LEVOTHYROXINE SODIUM 112 UG/1
112 TABLET ORAL
Status: DISCONTINUED | OUTPATIENT
Start: 2017-04-05 | End: 2017-04-08 | Stop reason: HOSPADM

## 2017-04-04 RX ORDER — PROMETHAZINE HYDROCHLORIDE 25 MG/1
25 TABLET ORAL ONCE AS NEEDED
Status: DISCONTINUED | OUTPATIENT
Start: 2017-04-04 | End: 2017-04-04 | Stop reason: HOSPADM

## 2017-04-04 RX ORDER — GABAPENTIN 300 MG/1
300 CAPSULE ORAL EVERY 8 HOURS SCHEDULED
Status: DISCONTINUED | OUTPATIENT
Start: 2017-04-04 | End: 2017-04-08 | Stop reason: HOSPADM

## 2017-04-04 RX ORDER — ONDANSETRON 2 MG/ML
4 INJECTION INTRAMUSCULAR; INTRAVENOUS EVERY 6 HOURS PRN
Status: DISCONTINUED | OUTPATIENT
Start: 2017-04-04 | End: 2017-04-08 | Stop reason: HOSPADM

## 2017-04-04 RX ORDER — LABETALOL HYDROCHLORIDE 5 MG/ML
5 INJECTION, SOLUTION INTRAVENOUS
Status: DISCONTINUED | OUTPATIENT
Start: 2017-04-04 | End: 2017-04-04 | Stop reason: HOSPADM

## 2017-04-04 RX ORDER — VANCOMYCIN HYDROCHLORIDE
15 ONCE
Status: COMPLETED | OUTPATIENT
Start: 2017-04-04 | End: 2017-04-04

## 2017-04-04 RX ORDER — ONDANSETRON 2 MG/ML
4 INJECTION INTRAMUSCULAR; INTRAVENOUS ONCE AS NEEDED
Status: DISCONTINUED | OUTPATIENT
Start: 2017-04-04 | End: 2017-04-04 | Stop reason: HOSPADM

## 2017-04-04 RX ORDER — ATRACURIUM BESYLATE 10 MG/ML
INJECTION, SOLUTION INTRAVENOUS AS NEEDED
Status: DISCONTINUED | OUTPATIENT
Start: 2017-04-04 | End: 2017-04-04 | Stop reason: SURG

## 2017-04-04 RX ORDER — DOCUSATE SODIUM 100 MG/1
100 CAPSULE, LIQUID FILLED ORAL 2 TIMES DAILY PRN
Status: DISCONTINUED | OUTPATIENT
Start: 2017-04-04 | End: 2017-04-08 | Stop reason: HOSPADM

## 2017-04-04 RX ORDER — NALOXONE HCL 0.4 MG/ML
0.1 VIAL (ML) INJECTION
Status: DISCONTINUED | OUTPATIENT
Start: 2017-04-04 | End: 2017-04-08 | Stop reason: HOSPADM

## 2017-04-04 RX ORDER — ONDANSETRON 4 MG/1
4 TABLET, FILM COATED ORAL EVERY 4 HOURS PRN
Status: DISCONTINUED | OUTPATIENT
Start: 2017-04-04 | End: 2017-04-08 | Stop reason: HOSPADM

## 2017-04-04 RX ORDER — OXYCODONE AND ACETAMINOPHEN 10; 325 MG/1; MG/1
1 TABLET ORAL EVERY 4 HOURS PRN
Status: DISCONTINUED | OUTPATIENT
Start: 2017-04-04 | End: 2017-04-08 | Stop reason: HOSPADM

## 2017-04-04 RX ORDER — SODIUM CHLORIDE 0.9 % (FLUSH) 0.9 %
1-10 SYRINGE (ML) INJECTION AS NEEDED
Status: DISCONTINUED | OUTPATIENT
Start: 2017-04-04 | End: 2017-04-04 | Stop reason: HOSPADM

## 2017-04-04 RX ORDER — FERROUS SULFATE 325(65) MG
325 TABLET ORAL 2 TIMES DAILY WITH MEALS
Status: DISCONTINUED | OUTPATIENT
Start: 2017-04-05 | End: 2017-04-08 | Stop reason: HOSPADM

## 2017-04-04 RX ORDER — FAMOTIDINE 20 MG/1
20 TABLET, FILM COATED ORAL
Status: DISCONTINUED | OUTPATIENT
Start: 2017-04-04 | End: 2017-04-04 | Stop reason: HOSPADM

## 2017-04-04 RX ORDER — CEFAZOLIN SODIUM 2 G/100ML
2 INJECTION, SOLUTION INTRAVENOUS ONCE
Status: DISCONTINUED | OUTPATIENT
Start: 2017-04-04 | End: 2017-04-04 | Stop reason: CLARIF

## 2017-04-04 RX ORDER — FENTANYL CITRATE 50 UG/ML
50 INJECTION, SOLUTION INTRAMUSCULAR; INTRAVENOUS
Status: DISCONTINUED | OUTPATIENT
Start: 2017-04-04 | End: 2017-04-04

## 2017-04-04 RX ORDER — ONDANSETRON 4 MG/1
4 TABLET, FILM COATED ORAL EVERY 6 HOURS PRN
Status: DISCONTINUED | OUTPATIENT
Start: 2017-04-04 | End: 2017-04-08 | Stop reason: HOSPADM

## 2017-04-04 RX ORDER — PROMETHAZINE HYDROCHLORIDE 25 MG/ML
6.25 INJECTION, SOLUTION INTRAMUSCULAR; INTRAVENOUS ONCE AS NEEDED
Status: DISCONTINUED | OUTPATIENT
Start: 2017-04-04 | End: 2017-04-04 | Stop reason: HOSPADM

## 2017-04-04 RX ORDER — ACETAMINOPHEN 325 MG/1
650 TABLET ORAL 3 TIMES DAILY
Status: DISCONTINUED | OUTPATIENT
Start: 2017-04-04 | End: 2017-04-08 | Stop reason: HOSPADM

## 2017-04-04 RX ORDER — HYDROMORPHONE HCL 110MG/55ML
PATIENT CONTROLLED ANALGESIA SYRINGE INTRAVENOUS AS NEEDED
Status: DISCONTINUED | OUTPATIENT
Start: 2017-04-04 | End: 2017-04-04 | Stop reason: SURG

## 2017-04-04 RX ORDER — VANCOMYCIN HYDROCHLORIDE
15 EVERY 12 HOURS
Status: COMPLETED | OUTPATIENT
Start: 2017-04-05 | End: 2017-04-05

## 2017-04-04 RX ORDER — DULOXETIN HYDROCHLORIDE 60 MG/1
60 CAPSULE, DELAYED RELEASE ORAL NIGHTLY
Status: DISCONTINUED | OUTPATIENT
Start: 2017-04-04 | End: 2017-04-08 | Stop reason: HOSPADM

## 2017-04-04 RX ORDER — SENNA AND DOCUSATE SODIUM 50; 8.6 MG/1; MG/1
1 TABLET, FILM COATED ORAL NIGHTLY PRN
Status: DISCONTINUED | OUTPATIENT
Start: 2017-04-04 | End: 2017-04-08 | Stop reason: HOSPADM

## 2017-04-04 RX ORDER — FAMOTIDINE 10 MG/ML
20 INJECTION, SOLUTION INTRAVENOUS
Status: DISCONTINUED | OUTPATIENT
Start: 2017-04-04 | End: 2017-04-04 | Stop reason: HOSPADM

## 2017-04-04 RX ORDER — PROMETHAZINE HYDROCHLORIDE 25 MG/ML
12.5 INJECTION, SOLUTION INTRAMUSCULAR; INTRAVENOUS EVERY 6 HOURS PRN
Status: DISCONTINUED | OUTPATIENT
Start: 2017-04-04 | End: 2017-04-08 | Stop reason: HOSPADM

## 2017-04-04 RX ORDER — PROMETHAZINE HYDROCHLORIDE 25 MG/1
25 SUPPOSITORY RECTAL ONCE AS NEEDED
Status: DISCONTINUED | OUTPATIENT
Start: 2017-04-04 | End: 2017-04-04 | Stop reason: HOSPADM

## 2017-04-04 RX ORDER — ROCURONIUM BROMIDE 10 MG/ML
INJECTION, SOLUTION INTRAVENOUS AS NEEDED
Status: DISCONTINUED | OUTPATIENT
Start: 2017-04-04 | End: 2017-04-04 | Stop reason: SURG

## 2017-04-04 RX ORDER — LIDOCAINE HYDROCHLORIDE 10 MG/ML
INJECTION, SOLUTION EPIDURAL; INFILTRATION; INTRACAUDAL; PERINEURAL AS NEEDED
Status: DISCONTINUED | OUTPATIENT
Start: 2017-04-04 | End: 2017-04-04 | Stop reason: SURG

## 2017-04-04 RX ADMIN — GABAPENTIN 300 MG: 300 CAPSULE ORAL at 21:03

## 2017-04-04 RX ADMIN — HYDROMORPHONE HYDROCHLORIDE 2 MG: 2 INJECTION, SOLUTION INTRAMUSCULAR; INTRAVENOUS; SUBCUTANEOUS at 17:30

## 2017-04-04 RX ADMIN — SODIUM CHLORIDE, POTASSIUM CHLORIDE, SODIUM LACTATE AND CALCIUM CHLORIDE 9 ML/HR: 600; 310; 30; 20 INJECTION, SOLUTION INTRAVENOUS at 09:08

## 2017-04-04 RX ADMIN — SODIUM CHLORIDE, POTASSIUM CHLORIDE, SODIUM LACTATE AND CALCIUM CHLORIDE: 600; 310; 30; 20 INJECTION, SOLUTION INTRAVENOUS at 11:53

## 2017-04-04 RX ADMIN — TRANEXAMIC ACID 767 MG: 100 INJECTION, SOLUTION INTRAVENOUS at 11:58

## 2017-04-04 RX ADMIN — DULOXETINE 60 MG: 60 CAPSULE, DELAYED RELEASE ORAL at 21:03

## 2017-04-04 RX ADMIN — SODIUM CHLORIDE, POTASSIUM CHLORIDE, SODIUM LACTATE AND CALCIUM CHLORIDE 9 ML/HR: 600; 310; 30; 20 INJECTION, SOLUTION INTRAVENOUS at 09:10

## 2017-04-04 RX ADMIN — MUPIROCIN: 20 OINTMENT TOPICAL at 09:03

## 2017-04-04 RX ADMIN — FAMOTIDINE 20 MG: 20 TABLET, FILM COATED ORAL at 09:03

## 2017-04-04 RX ADMIN — FENTANYL CITRATE 50 MCG: 50 INJECTION, SOLUTION INTRAMUSCULAR; INTRAVENOUS at 14:47

## 2017-04-04 RX ADMIN — PROPOFOL 200 MG: 10 INJECTION, EMULSION INTRAVENOUS at 11:57

## 2017-04-04 RX ADMIN — ROCURONIUM BROMIDE 50 MG: 10 INJECTION INTRAVENOUS at 11:57

## 2017-04-04 RX ADMIN — VANCOMYCIN HYDROCHLORIDE 1250 MG: 1 INJECTION, POWDER, LYOPHILIZED, FOR SOLUTION INTRAVENOUS at 10:46

## 2017-04-04 RX ADMIN — FENTANYL CITRATE 100 MCG: 50 INJECTION, SOLUTION INTRAMUSCULAR; INTRAVENOUS at 11:57

## 2017-04-04 RX ADMIN — TRANEXAMIC ACID 1 MG/KG/HR: 100 INJECTION, SOLUTION INTRAVENOUS at 12:28

## 2017-04-04 RX ADMIN — FENTANYL CITRATE 50 MCG: 50 INJECTION INTRAMUSCULAR; INTRAVENOUS at 18:25

## 2017-04-04 RX ADMIN — SODIUM CHLORIDE, POTASSIUM CHLORIDE, SODIUM LACTATE AND CALCIUM CHLORIDE: 600; 310; 30; 20 INJECTION, SOLUTION INTRAVENOUS at 13:30

## 2017-04-04 RX ADMIN — LIDOCAINE HYDROCHLORIDE 0.6 ML: 10 INJECTION, SOLUTION EPIDURAL; INFILTRATION; INTRACAUDAL; PERINEURAL at 09:36

## 2017-04-04 RX ADMIN — DEXAMETHASONE SODIUM PHOSPHATE 8 MG: 4 INJECTION, SOLUTION INTRAMUSCULAR; INTRAVENOUS at 12:14

## 2017-04-04 RX ADMIN — Medication: at 18:17

## 2017-04-04 RX ADMIN — ATRACURIUM BESYLATE 30 MG: 10 INJECTION, SOLUTION INTRAVENOUS at 13:02

## 2017-04-04 RX ADMIN — MORPHINE SULFATE 15 MG: 15 TABLET, EXTENDED RELEASE ORAL at 21:03

## 2017-04-04 RX ADMIN — LIDOCAINE HYDROCHLORIDE 50 MG: 10 INJECTION, SOLUTION EPIDURAL; INFILTRATION; INTRACAUDAL; PERINEURAL at 11:57

## 2017-04-04 RX ADMIN — ACETAMINOPHEN 650 MG: 325 TABLET, FILM COATED ORAL at 22:43

## 2017-04-04 RX ADMIN — ATRACURIUM BESYLATE 30 MG: 10 INJECTION, SOLUTION INTRAVENOUS at 15:09

## 2017-04-04 RX ADMIN — FENTANYL CITRATE 50 MCG: 50 INJECTION INTRAMUSCULAR; INTRAVENOUS at 19:10

## 2017-04-04 RX ADMIN — FENTANYL CITRATE 50 MCG: 50 INJECTION, SOLUTION INTRAMUSCULAR; INTRAVENOUS at 15:28

## 2017-04-04 RX ADMIN — FENTANYL CITRATE 50 MCG: 50 INJECTION INTRAMUSCULAR; INTRAVENOUS at 19:05

## 2017-04-04 RX ADMIN — ATRACURIUM BESYLATE 20 MG: 10 INJECTION, SOLUTION INTRAVENOUS at 14:00

## 2017-04-04 RX ADMIN — ALBUMIN HUMAN: 0.05 INJECTION, SOLUTION INTRAVENOUS at 15:30

## 2017-04-04 RX ADMIN — SODIUM CHLORIDE, POTASSIUM CHLORIDE, SODIUM LACTATE AND CALCIUM CHLORIDE 100 ML/HR: 600; 310; 30; 20 INJECTION, SOLUTION INTRAVENOUS at 21:02

## 2017-04-04 RX ADMIN — FENTANYL CITRATE 50 MCG: 50 INJECTION INTRAMUSCULAR; INTRAVENOUS at 18:20

## 2017-04-04 RX ADMIN — TIZANIDINE 4 MG: 4 TABLET ORAL at 21:03

## 2017-04-04 RX ADMIN — OXYCODONE HYDROCHLORIDE AND ACETAMINOPHEN 1 TABLET: 10; 325 TABLET ORAL at 21:04

## 2017-04-04 NOTE — ANESTHESIA PROCEDURE NOTES
Peripheral IV    Patient location during procedure: OR  Start time: 4/4/2017 12:49 PM  Line placed for Fluids/Medication Admin.  Performed By   CRNA: AGUILAR ANN  Preanesthetic Checklist  Completed: patient identified, site marked, surgical consent, pre-op evaluation, timeout performed, IV checked, risks and benefits discussed and monitors and equipment checked  Peripheral IV Prep   Prep: ChloraPrep  Peripheral IV Procedure   Laterality:right  Location:  Hand  Catheter size: 18 G         Post Assessment   IV Dressing/Site: clean, dry and intact

## 2017-04-04 NOTE — OP NOTE
DATE OF PROCEDURE:  04/04/2017    PREOPERATIVE DIAGNOSES:  1. Degenerative lumbar scoliosis.   2. L5-S1 degenerative spinal stenosis.   3. Status post L4-L5 posterior fusion for degenerative spondylolisthesis.   4. Intractable mechanical back and leg pain.     POSTOPERATIVE DIAGNOSES:  1. Degenerative lumbar scoliosis.   2. L5-S1 degenerative spinal stenosis.   3. Status post L4-L5 posterior fusion for degenerative spondylolisthesis.   4. Intractable mechanical back and leg pain.      PROCEDURES PERFORMED:  1. L5-S1 Smith-Clement osteotomy.   2. Left L2-L3 transforaminal lumbar interbody fusion with an 8 x 26 mm Capstone Cage and morselized bone graft.   3. Right L3-L4 transforaminal lumbar interbody fusion with a 10 x 26 mm Capstone Cage and morselized bone graft.   4. L2 to S1 posterolateral fusion with morselized bone graft.   5. L2 to S1 segmental posterior pedicle screw fixation with TSRH-3-D pedicle screws.   6. Local bone autograft harvesting.     SURGEON: Connor Lopez MD    ASSISTANT: Renetta Milan PA-C     INDICATIONS: This 56-year-old woman has a long history of lower back pain and leg pain. She has previously had an L4-L5 posterior fusion for degenerative spondylolisthesis. She has developed a degenerative scoliosis, as well as lateral recess and foraminal stenosis at L5-S1. She presents for operative decompression of the spinal canal and stabilization of the spine with correction of the scoliosis in an attempt to relieve back and leg pain.     DESCRIPTION OF PROCEDURE: The patient was brought into the operating suite, and in the supine position general endotracheal anesthesia was induced. СВЕТЛАНА hose and pneumatic compression devices were applied to the legs. A Serna catheter was placed in the bladder. Denise pins were applied to the head. The patient was carefully rolled into the prone position and placed on the Roque table. The Denise headrest was attached to the operating table to support the  head such that the face was free of compression. Then, the skin of the back was prepared with chlorhexidine, alcohol and a ChloraPrep skin preparation kit. The field was draped in a sterile fashion. The midline incision was marked. The skin was incised with a #10 blade. Hemostasis was obtained with the Bovie. Dissection was continued down to the spinous processes. The spinous processes of L1, L2 and L3 were identified as was the spinous process of L5 and S1. The paraspinal muscle was dissected laterally and then over the facet joints from L1-L2 to L5-S1. While cauterizing the posterior aspect of the sacrum on the right side a small CSF leak was encountered. There were multiple small pinholes in otherwise very thin sacrum. This opening in the sacrum was plugged with non-suturable DuraGen. At the end of the case, this was covered with more DuraGen and Gelfoam.     Self-retaining retractors were placed. Then, the Midas Jensen drill with a 3 mm bur was used to perform a Rios-Clement osteotomy at the L5-S1 level. This was accomplished to decompress the neural foramina as well as the lateral recesses. The bone dust generated by the drill was collected in a Lukens trap and mixed with MasterGraft ceramic bone graft extender and [place] demineralized bone matrix putty to form the morselized bone graft. Once the nerve roots were decompressed, FloSeal and Gelfoam were placed in the epidural space. Of note, there were conjoint L5 and S1 nerve roots on the right side. Next, the pedicle instrumentation at the L4-L5 level was removed. The pedicle screws were removed. Gelfoam was placed in the pedicles.     Next, the Midas Jensen drill was used to perform partial hemilaminectomies and a total facetectomy at L3-L4 on the right and then L2-L3 on the left. These were accomplished to facilitate placement of interbody cages to correct the scoliosis. The posterior aspect of the annulus was exposed at each level and cleared of epidural veins.  The thinned bone was elevated with sharp curettes and removed with Kerrison rongeurs. The exiting and passing nerve roots were decompressed. Then, Gelfoam and Floseal were placed in the epidural space.     Next, the stereotactic reference frame was attached to the spinous process at S1. Then, the patient was covered with sterile drapes. The O-arm intraoperative CT scanner was brought in the field. A CT scan of the lumbosacral spine was accomplished. The CT scan data set was transferred to the Ste[facility] Station and the O-Arm removed. The extra drapes were removed. Then, using stereotactic navigation, the pedicles at L2, L3 and S1 were tapped. The walls of the pedicles were probed and found to be intact. Then, Gelfoam was placed within the pedicles.     Next, the transverse processes at L2 and L3, the fusion mass at L4-L5 and the ala of the sacrum on both sides were decorticated with the drill. Then, TSRH-3-D standard pedicle screws were placed at S1. TSRH-3-D MPA screws were placed at L2, L3, L4 and L5. Then, a distractor was placed over the L2 and L3 pedicle screws on the left side. The distractor was used to open the disk space and correct the scoliosis. The posterior annulus was incised and a diskectomy accomplished using various curettes, pituitary and Kerrison rongeurs. An 8 x 26 mm Capstone Cage was filled with morselized bone graft and then countersunk in the disk space. FloSeal and Gelfoam were placed over the back of the cage. The distractor was released.     Next, the distractor was placed over the right L3 and L4 pedicle screws. The disk space was distracted. The annulus was incised with a #11 blade. Then, a radical diskectomy was accomplished using various curettes, pituitary and Kerrison rongeurs. The vertebral endplates were roughened with the serrated curettes. Morselized bone graft was packed in the disk space. Then, a 10 x 26 mm Capstone Cage was filled with morselized bone graft and countersunk in the  disk space. FloSeal and Gelfoam were placed over the back of the cage and the distractor was removed.     Next, the 5.5 mm diameter cobalt chrome wendy was cut and contoured and attached to the pedicle screws with appropriate connectors. The connectors were tightened to breakoff torque on the setscrews. Then, the stems on the MPA screws were cut flush with the connectors. A cross-link was placed between the L4 and L5 pedicle screws and appropriately tightened. The rest of the morselized bone graft was placed in the lateral transverse process space from L2 to the ala of the sacrum on both sides. Then, a 10 mm flat JEAN-PIERRE drain was placed in the wound and brought out through a stab incision. The drain was sutured with 3-0 nylon. Non-suturable DuraGen was placed over the exposed dura at the L5-S1 level as well as over the sacrum on the right side. Then, vancomycin powder was sprinkled into the wound. The retractors were removed. The paraspinal muscles were approximated using interrupted 0 Vicryl stitches. The fascia was approximated with a running #1 Vicryl stitch. An x-ray was obtained at this time. This showed excellent alignment of the spine with good correction of the deformity. Then, the subcutaneous tissues were approximated with interrupted 2-0 Vicryl stitches. Then, 3-0 Vicryl subcuticular stitches were used to approximate the skin edges. Staples were placed in the skin. A Telfa, dressing sponge, tape dressing was applied to the back. The patient was then carefully rolled into the supine position again. The Denise pins were removed. She was allowed to awaken from anesthesia. She was transferred in stable condition to the recovery room. She tolerated surgery well. Estimated blood loss was 600 mL. The patient received 300 mL from the Cell Saver.      MD RANJAN Rojo/kanwal  DD: 04/04/2017 18:45:34  DT: 04/04/2017 19:25:00  Voice Rec. ID #[id number]  Voice Original ID #[id number]  Doc ID #[id number]  Rev. #0  cc:

## 2017-04-04 NOTE — ANESTHESIA PROCEDURE NOTES
Airway  Urgency: elective    Date/Time: 4/4/2017 12:30 PM  Airway not difficult    General Information and Staff    Patient location during procedure: OR  Anesthesiologist: RODO RUBIO  CRNA: AGUILAR ANN    Indications and Patient Condition  Indications for airway management: airway protection    Preoxygenated: yes  MILS not maintained throughout  Mask difficulty assessment: 1 - vent by mask    Final Airway Details  Final airway type: endotracheal airway      Successful airway: ETT  Cuffed: yes   Successful intubation technique: direct laryngoscopy  Endotracheal tube insertion site: oral  Blade: Maximo  Blade size: #3  ETT size: 7.5 mm  Cormack-Lehane Classification: grade I - full view of glottis  Placement verified by: chest auscultation and capnometry   Measured from: lips  ETT to lips (cm): 20  Number of attempts at approach: 1    Additional Comments  Negative epigastric sounds, Breath sound equal bilaterally with symmetric chest rise and fall. Atraumatic intubation. Lips, gums, teeth, soft tissue as preop.

## 2017-04-04 NOTE — ANESTHESIA PROCEDURE NOTES
Arterial Line    Patient location during procedure: OR  Start time: 4/4/2017 2:46 PM   Line placed for ABGs/Labs/ISTAT.  Performed By   Anesthesiologist: BOB JUNIOR  Preanesthetic Checklist  Completed: patient identified, site marked, surgical consent, pre-op evaluation, timeout performed, IV checked, risks and benefits discussed and monitors and equipment checked  Arterial Line Prep   Sterile Tech: cap, gloves and sterile barriers  Prep: ChloraPrep  Patient monitoring: EKG, continuous pulse oximetry and blood pressure monitoring  Arterial Line Procedure   Laterality:right  Location:  radial artery  Catheter size: 18 G   Number of attempts: 1  Successful placement: yes          Post Assessment   Dressing Type: wrist guard applied, secured with tape and occlusive dressing applied.   Complications no  Circ/Move/Sens Assessment: normal and unchanged.   Patient Tolerance: patient tolerated the procedure well with no apparent complications

## 2017-04-04 NOTE — ANESTHESIA POSTPROCEDURE EVALUATION
Patient: Huma Montano    Procedure Summary     Date Anesthesia Start Anesthesia Stop Room / Location    04/04/17 1153 1814 [facility] OR 13 / [facility]       Procedure Diagnosis Surgeon Provider    L5-S1 OSTEOTOMY; L2-L3,L3-L4 AND L5-S1 INTERBODY FUSIONS; L2 TO S1 POSTERIOR FUSION WITH PEDICLE SCREWS AND BONE GRAFT (N/A Spine Lumbar) No diagnosis on file. MD Da Cruz MD          Anesthesia Type: general  Last vitals  /78 (04/04/17 1810)    Temp 98.2 °F (36.8 °C) (04/04/17 1810)    Pulse 102 (04/04/17 1810)   Resp 18 (04/04/17 1810)    SpO2 100 % (04/04/17 1810)      Post Anesthesia Care and Evaluation    Patient location during evaluation: PACU  Patient participation: complete - patient participated  Level of consciousness: awake and alert  Pain score: 0  Pain management: adequate  Airway patency: patent  Anesthetic complications: No anesthetic complications  PONV Status: none  Cardiovascular status: hemodynamically stable and acceptable  Respiratory status: nonlabored ventilation, acceptable and nasal cannula  Hydration status: acceptable

## 2017-04-04 NOTE — BRIEF OP NOTE
LUMBAR LAMINECTOMY POSTERIOR LUMBAR INTERBODY FUSION  Procedure Note    Huma Montano  4/4/2017    Pre-op Diagnosis:   Lumbar scoliosis/stenosis    Post-op Diagnosis:     PRISCA    Procedure/CPT® Codes:      Procedure(s):  L5-S1 OSTEOTOMY; L2-L3,L3-L4  INTERBODY FUSIONS; L2 TO S1 POSTERIOR FUSION WITH PEDICLE SCREWS AND BONE GRAFT    Surgeon(s):  Connor Lopez MD    Anesthesia: General    Staff:   Cell Saver : Jagdish Mcgarry  Circulator: Misti Tuttle RN; Melany Winchester RN; Kamille Valle RN; Marilu Joshi RN  Perfusionist: Maisha Vázquez  Scrub Person: [place]; Jennifer Mckeon; Sang Fountain; Vandana Nunez  Vendor Representative: Anjum Álvarez  Assistant: Renetta Milan PA-C  [place]: Kristie Castellon RN    Estimated Blood Loss: 600 mL  Urine Voided: 300 mL    Specimens:                  ID Type Source Tests Collected by Time Destination   1 :  Blood Blood, Venous Line OR SURGERY PANEL Connor Lopez MD 4/4/2017 1257    2 :  Blood Blood, Arterial Line OR SURGERY PANEL Connor Lopez MD 4/4/2017 1446    3 :  Blood Blood, Arterial Line OR SURGERY PANEL Connor Lopez MD 4/4/2017 1701          Drains:   Drain/Device Site 04/04/17 1715 other (see comments) other (see comments) 1 (Active)       Urethral Catheter 04/04/17 1200 100% silicone 16 10 10 (Active)           Findings: significant correction of scoliosis    Complications: none      Connor Lopez MD     Date: 4/4/2017  Time: 5:54 PM

## 2017-04-04 NOTE — ANESTHESIA PREPROCEDURE EVALUATION
Anesthesia Evaluation     Patient summary reviewed and Nursing notes reviewed   no history of anesthetic complications:  NPO Status: > 8 hours   Airway   Mallampati: I  TM distance: >3 FB  Neck ROM: full  no difficulty expected  Dental - normal exam     Pulmonary - negative pulmonary ROS and normal exam    breath sounds clear to auscultation  Cardiovascular - normal exam  Exercise tolerance: good (4-7 METS)    Rhythm: regular  Rate: normal        Neuro/Psych- negative ROS  GI/Hepatic/Renal/Endo    (+)  hypothyroidism,   (-) hyperthyroidism    Musculoskeletal     (+) back pain, neck pain,   Abdominal  - normal exam  (-) obese   Substance History      OB/GYN          Other   (+) arthritis                                 Anesthesia Plan    ASA 2     general     intravenous induction   Anesthetic plan and risks discussed with patient.    Plan discussed with CRNA.

## 2017-04-04 NOTE — INTERVAL H&P NOTE
Dr. Lopez's H&P from 4/3/2017 was reviewed with the following changes/additions:    Temp:  [97.2 °F (36.2 °C)] 97.2 °F (36.2 °C)  Heart Rate:  [75] 75  Resp:  [18] 18  BP: (149)/(91) 149/91     Heart: RRR  Lungs: CTAB    Immunizations:    Tetanus: Unknown     Influenza: 2016     Pneumo: No    Labs were reviewed.     EKG: NSR    Plan: L5-S1 Oseotomy and Tlif, L3-4, L2-3 Tlif L3-4, L2-3 Tlif, L2-S1 posterior fusion and Psf

## 2017-04-05 LAB
BASOPHILS # BLD AUTO: 0.02 10*3/MM3 (ref 0–0.2)
BASOPHILS NFR BLD AUTO: 0.1 % (ref 0–1)
DEPRECATED RDW RBC AUTO: 44.7 FL (ref 37–54)
EOSINOPHIL # BLD AUTO: 0.04 10*3/MM3 (ref 0.1–0.3)
EOSINOPHIL NFR BLD AUTO: 0.2 % (ref 0–3)
ERYTHROCYTE [DISTWIDTH] IN BLOOD BY AUTOMATED COUNT: 12.6 % (ref 11.3–14.5)
HCT VFR BLD AUTO: 36.9 % (ref 34.5–44)
HGB BLD-MCNC: 12.3 G/DL (ref 11.5–15.5)
IMM GRANULOCYTES # BLD: 0.07 10*3/MM3 (ref 0–0.03)
IMM GRANULOCYTES NFR BLD: 0.4 % (ref 0–0.6)
LYMPHOCYTES # BLD AUTO: 1.84 10*3/MM3 (ref 0.6–4.8)
LYMPHOCYTES NFR BLD AUTO: 10.2 % (ref 24–44)
MCH RBC QN AUTO: 32.9 PG (ref 27–31)
MCHC RBC AUTO-ENTMCNC: 33.3 G/DL (ref 32–36)
MCV RBC AUTO: 98.7 FL (ref 80–99)
MONOCYTES # BLD AUTO: 1.8 10*3/MM3 (ref 0–1)
MONOCYTES NFR BLD AUTO: 9.9 % (ref 0–12)
NEUTROPHILS # BLD AUTO: 14.35 10*3/MM3 (ref 1.5–8.3)
NEUTROPHILS NFR BLD AUTO: 79.2 % (ref 41–71)
PLATELET # BLD AUTO: 270 10*3/MM3 (ref 150–450)
PMV BLD AUTO: 10.4 FL (ref 6–12)
RBC # BLD AUTO: 3.74 10*6/MM3 (ref 3.89–5.14)
WBC NRBC COR # BLD: 18.12 10*3/MM3 (ref 3.5–10.8)

## 2017-04-05 PROCEDURE — 87086 URINE CULTURE/COLONY COUNT: CPT | Performed by: NEUROLOGICAL SURGERY

## 2017-04-05 PROCEDURE — 25010000002 VANCOMYCIN HCL IN NACL 1.25-0.9 GM/250ML-% SOLUTION: Performed by: NEUROLOGICAL SURGERY

## 2017-04-05 PROCEDURE — 99024 POSTOP FOLLOW-UP VISIT: CPT | Performed by: NEUROLOGICAL SURGERY

## 2017-04-05 PROCEDURE — 85025 COMPLETE CBC W/AUTO DIFF WBC: CPT | Performed by: NEUROLOGICAL SURGERY

## 2017-04-05 RX ADMIN — TIZANIDINE 4 MG: 4 TABLET ORAL at 22:05

## 2017-04-05 RX ADMIN — FERROUS SULFATE TAB 325 MG (65 MG ELEMENTAL FE) 325 MG: 325 (65 FE) TAB at 08:23

## 2017-04-05 RX ADMIN — TIZANIDINE 4 MG: 4 TABLET ORAL at 05:17

## 2017-04-05 RX ADMIN — Medication: at 14:50

## 2017-04-05 RX ADMIN — GABAPENTIN 300 MG: 300 CAPSULE ORAL at 14:09

## 2017-04-05 RX ADMIN — SODIUM CHLORIDE, POTASSIUM CHLORIDE, SODIUM LACTATE AND CALCIUM CHLORIDE 100 ML/HR: 600; 310; 30; 20 INJECTION, SOLUTION INTRAVENOUS at 14:50

## 2017-04-05 RX ADMIN — OXYCODONE HYDROCHLORIDE AND ACETAMINOPHEN 1 TABLET: 10; 325 TABLET ORAL at 12:16

## 2017-04-05 RX ADMIN — OXYCODONE HYDROCHLORIDE AND ACETAMINOPHEN 1 TABLET: 10; 325 TABLET ORAL at 08:23

## 2017-04-05 RX ADMIN — ACETAMINOPHEN 650 MG: 325 TABLET, FILM COATED ORAL at 08:23

## 2017-04-05 RX ADMIN — Medication: at 04:22

## 2017-04-05 RX ADMIN — SODIUM CHLORIDE, POTASSIUM CHLORIDE, SODIUM LACTATE AND CALCIUM CHLORIDE 100 ML/HR: 600; 310; 30; 20 INJECTION, SOLUTION INTRAVENOUS at 05:17

## 2017-04-05 RX ADMIN — Medication 1 TABLET: at 08:23

## 2017-04-05 RX ADMIN — MORPHINE SULFATE 15 MG: 15 TABLET, EXTENDED RELEASE ORAL at 21:58

## 2017-04-05 RX ADMIN — TIZANIDINE 4 MG: 4 TABLET ORAL at 14:09

## 2017-04-05 RX ADMIN — MORPHINE SULFATE 15 MG: 15 TABLET, EXTENDED RELEASE ORAL at 08:23

## 2017-04-05 RX ADMIN — FERROUS SULFATE TAB 325 MG (65 MG ELEMENTAL FE) 325 MG: 325 (65 FE) TAB at 17:24

## 2017-04-05 RX ADMIN — DULOXETINE 60 MG: 60 CAPSULE, DELAYED RELEASE ORAL at 21:58

## 2017-04-05 RX ADMIN — Medication: at 21:54

## 2017-04-05 RX ADMIN — OXYCODONE HYDROCHLORIDE AND ACETAMINOPHEN 1 TABLET: 10; 325 TABLET ORAL at 17:25

## 2017-04-05 RX ADMIN — LEVOTHYROXINE SODIUM 112 MCG: 112 TABLET ORAL at 05:14

## 2017-04-05 RX ADMIN — OXYCODONE HYDROCHLORIDE AND ACETAMINOPHEN 1 TABLET: 10; 325 TABLET ORAL at 01:04

## 2017-04-05 RX ADMIN — VANCOMYCIN HYDROCHLORIDE 1250 MG: 1 INJECTION, POWDER, LYOPHILIZED, FOR SOLUTION INTRAVENOUS at 00:22

## 2017-04-05 RX ADMIN — GABAPENTIN 300 MG: 300 CAPSULE ORAL at 21:58

## 2017-04-05 RX ADMIN — ACETAMINOPHEN 650 MG: 325 TABLET, FILM COATED ORAL at 17:24

## 2017-04-05 RX ADMIN — GABAPENTIN 300 MG: 300 CAPSULE ORAL at 05:14

## 2017-04-05 RX ADMIN — OXYCODONE HYDROCHLORIDE AND ACETAMINOPHEN 1 TABLET: 10; 325 TABLET ORAL at 05:14

## 2017-04-05 NOTE — PLAN OF CARE
Problem: Patient Care Overview (Adult)  Goal: Plan of Care Review  Outcome: Ongoing (interventions implemented as appropriate)    04/05/17 0413   Coping/Psychosocial Response Interventions   Plan Of Care Reviewed With patient   Patient Care Overview   Progress no change   Outcome Evaluation   Outcome Summary/Follow up Plan Patient on flat bedrest. FC and JEAN-PIERRE drain intact. Dilaudid PCA on second level. Pain control has been an issue, pt takes a lot of pain medication at home for chronic pain. Supplementing with PRN PO meds.         Problem: Perioperative Period (Adult)  Goal: Signs and Symptoms of Listed Potential Problems Will be Absent or Manageable (Perioperative Period)  Outcome: Ongoing (interventions implemented as appropriate)    Problem: Laminectomy/Foraminotomy/Discectomy (Adult)  Goal: Signs and Symptoms of Listed Potential Problems Will be Absent or Manageable (Laminectomy/Foraminotomy/Discectomy)  Outcome: Ongoing (interventions implemented as appropriate)

## 2017-04-05 NOTE — PROGRESS NOTES
Discharge Planning Assessment  [facility]     Patient Name: Huma Montano  MRN: [id number]  Today's Date: 4/5/2017    Admit Date: 4/4/2017          Discharge Needs Assessment       04/05/17 1122    Living Environment    Lives With alone    Living Arrangements house    Provides Primary Care For no one    Primary Care Provided By child(kaylen) (specify)    Quality Of Family Relationships supportive    Able to Return to Prior Living Arrangements yes    Living Arrangement Comments Lives in two level home, bedroom upstairs, has made arrangemtns to have everythng she needs on 2nd level    Discharge Needs Assessment    Concerns To Be Addressed no discharge needs identified    Readmission Within The Last 30 Days no previous admission in last 30 days    Anticipated Changes Related to Illness inability to care for self    Equipment Currently Used at Home none   has higher toilets installed    Equipment Needed After Discharge none    Transportation Available car;family or friend will provide    Discharge Disposition home or self-care            Discharge Plan       04/05/17 1124    Case Management/Social Work Plan    Plan Home    Patient/Family In Agreement With Plan yes    Additional Comments I met with Ms Montano to discuss d/c plan. Her plan/goal is to return home. She lives alone, has two daughters who will be alternating staying with her over the next several weeks. 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 She plan to have daughters stay with her over the next few weeks. She uses no equipment. She has insurance coverage for Rx meds..No d/c needs identifed at this time. Case mgt will follow progress.        Discharge Placement     No information found                Demographic Summary       04/05/17 1120    Referral Information    Admission Type inpatient    Arrived From admitted as an inpatient    Reason For Consult discharge planning    Record Reviewed history and  physical;medical record    Contact Information    Permission Granted to Share Information With     Primary Care Physician Information    Name Joseph Bishop            Functional Status       04/05/17 1120    Functional Status Prior    Ambulation 0-->independent    Transferring 0-->independent    Toileting 0-->independent    Bathing 0-->independent    Dressing 0-->independent    Eating 0-->independent    Communication 0-->understands/communicates without difficulty    Swallowing 0-->swallows foods/liquids without difficulty    IADL    Medications independent    Meal Preparation independent    Housekeeping independent    Laundry independent    Shopping independent    Oral Care independent    Cognitive/Perceptual/Developmental    Current Mental Status/Cognitive Functioning no deficits noted    Recent Changes in Mental Status/Cognitive Functioning no changes    Employment/Financial    Shift Worked first shift    Current Occupation (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

## 2017-04-05 NOTE — PROGRESS NOTES
LOS: 1 day   Patient Care Team:  Martinez Cameron MD as PCP - General (Family Medicine)  Martinez Cameron MD as Referring Physician (Family Medicine)  Guillaume Morales II, MD (Pain Medicine)    Chief Complaint:  POD # 1        Interval History: back plainful;  Having low back spasms and some L quad spasms.  No leg pain/sciatica.  Taking po fluids well.  No headache.  JEAN-PIERRE 330 ml post op.      Review of Systems:  done      Vital Signs  Temp:  [97.2 °F (36.2 °C)-98.6 °F (37 °C)] 98.4 °F (36.9 °C)  Heart Rate:  [] 78  Resp:  [16-18] 16  BP: (100-149)/(60-93) 100/60    Intake/Output Summary (Last 24 hours) at 04/05/17 0716  Last data filed at 04/05/17 0429   Gross per 24 hour   Intake             3800 ml   Output             3130 ml   Net              670 ml     Physical Exam: leg strength and sensation normal.  L quad tender around inguinal region.        Results Review:     Lab Results   Component Value Date    WBC 18.12 (H) 04/05/2017    HGB 12.3 04/05/2017    HCT 36.9 04/05/2017    MCV 98.7 04/05/2017     04/05/2017     Lab Results   Component Value Date    GLUCOSE 94 03/26/2017    CALCIUM 9.9 07/17/2016     07/17/2016    K 3.6 07/17/2016    CO2 29.0 07/17/2016     07/17/2016    BUN 16 07/17/2016    CREATININE 0.90 07/17/2016    EGFRIFNONA 65 07/17/2016    BCR 17.8 07/17/2016    ANIONGAP 11.0 07/17/2016           Assessment/Plan     Active Problems:    Scoliosis deformity of spine      Plan:  Continue flat bedrest today;  Ambulate tomorrow;  Pain meds and muscle relaxants.      Plan for disposition      Connor Lopez MD  04/05/17  7:16 AM

## 2017-04-06 PROCEDURE — 99024 POSTOP FOLLOW-UP VISIT: CPT | Performed by: NEUROLOGICAL SURGERY

## 2017-04-06 PROCEDURE — 94799 UNLISTED PULMONARY SVC/PX: CPT

## 2017-04-06 PROCEDURE — 97162 PT EVAL MOD COMPLEX 30 MIN: CPT

## 2017-04-06 PROCEDURE — 97116 GAIT TRAINING THERAPY: CPT

## 2017-04-06 RX ORDER — SODIUM CHLORIDE, SODIUM LACTATE, POTASSIUM CHLORIDE, CALCIUM CHLORIDE 600; 310; 30; 20 MG/100ML; MG/100ML; MG/100ML; MG/100ML
50 INJECTION, SOLUTION INTRAVENOUS CONTINUOUS
Status: DISCONTINUED | OUTPATIENT
Start: 2017-04-06 | End: 2017-04-08 | Stop reason: HOSPADM

## 2017-04-06 RX ADMIN — FERROUS SULFATE TAB 325 MG (65 MG ELEMENTAL FE) 325 MG: 325 (65 FE) TAB at 08:03

## 2017-04-06 RX ADMIN — ACETAMINOPHEN 325 MG: 325 TABLET, FILM COATED ORAL at 08:03

## 2017-04-06 RX ADMIN — Medication 1 TABLET: at 08:03

## 2017-04-06 RX ADMIN — TIZANIDINE 4 MG: 4 TABLET ORAL at 05:42

## 2017-04-06 RX ADMIN — Medication: at 16:02

## 2017-04-06 RX ADMIN — SODIUM CHLORIDE, POTASSIUM CHLORIDE, SODIUM LACTATE AND CALCIUM CHLORIDE 100 ML/HR: 600; 310; 30; 20 INJECTION, SOLUTION INTRAVENOUS at 01:28

## 2017-04-06 RX ADMIN — Medication: at 04:36

## 2017-04-06 RX ADMIN — LEVOTHYROXINE SODIUM 112 MCG: 112 TABLET ORAL at 05:42

## 2017-04-06 RX ADMIN — MORPHINE SULFATE 15 MG: 15 TABLET, EXTENDED RELEASE ORAL at 20:41

## 2017-04-06 RX ADMIN — OXYCODONE HYDROCHLORIDE AND ACETAMINOPHEN 1 TABLET: 10; 325 TABLET ORAL at 08:03

## 2017-04-06 RX ADMIN — OXYCODONE HYDROCHLORIDE AND ACETAMINOPHEN 1 TABLET: 10; 325 TABLET ORAL at 15:26

## 2017-04-06 RX ADMIN — ACETAMINOPHEN 650 MG: 325 TABLET, FILM COATED ORAL at 16:02

## 2017-04-06 RX ADMIN — Medication: at 11:20

## 2017-04-06 RX ADMIN — ACETAMINOPHEN 650 MG: 325 TABLET, FILM COATED ORAL at 20:41

## 2017-04-06 RX ADMIN — GABAPENTIN 300 MG: 300 CAPSULE ORAL at 15:26

## 2017-04-06 RX ADMIN — GABAPENTIN 300 MG: 300 CAPSULE ORAL at 20:41

## 2017-04-06 RX ADMIN — DOCUSATE SODIUM 100 MG: 100 CAPSULE, LIQUID FILLED ORAL at 05:43

## 2017-04-06 RX ADMIN — OXYCODONE HYDROCHLORIDE AND ACETAMINOPHEN 1 TABLET: 10; 325 TABLET ORAL at 12:18

## 2017-04-06 RX ADMIN — TIZANIDINE 4 MG: 4 TABLET ORAL at 15:26

## 2017-04-06 RX ADMIN — OXYCODONE HYDROCHLORIDE AND ACETAMINOPHEN 1 TABLET: 10; 325 TABLET ORAL at 04:40

## 2017-04-06 RX ADMIN — DULOXETINE 60 MG: 60 CAPSULE, DELAYED RELEASE ORAL at 20:41

## 2017-04-06 RX ADMIN — GABAPENTIN 300 MG: 300 CAPSULE ORAL at 05:42

## 2017-04-06 RX ADMIN — OXYCODONE HYDROCHLORIDE AND ACETAMINOPHEN 1 TABLET: 10; 325 TABLET ORAL at 22:57

## 2017-04-06 RX ADMIN — OXYCODONE HYDROCHLORIDE AND ACETAMINOPHEN 1 TABLET: 10; 325 TABLET ORAL at 00:40

## 2017-04-06 RX ADMIN — FERROUS SULFATE TAB 325 MG (65 MG ELEMENTAL FE) 325 MG: 325 (65 FE) TAB at 17:46

## 2017-04-06 RX ADMIN — MORPHINE SULFATE 15 MG: 15 TABLET, EXTENDED RELEASE ORAL at 08:03

## 2017-04-06 RX ADMIN — DOCUSATE SODIUM 100 MG: 100 CAPSULE, LIQUID FILLED ORAL at 20:41

## 2017-04-06 NOTE — PROGRESS NOTES
LOS: 2 days   Patient Care Team:  Martinez Cameron MD as PCP - General (Family Medicine)  Martinez Cameron MD as Referring Physician (Family Medicine)  Guillaume Morales II, MD (Pain Medicine)    Chief Complaint:  POD # 2        Interval History: back painful;  Some R hip/groin pain;  Taking po well;  Moderate JEAN-PIERRE output;  No headache this am;      Review of Systems:  done      Vital Signs  Temp:  [98.3 °F (36.8 °C)-98.9 °F (37.2 °C)] 98.9 °F (37.2 °C)  Heart Rate:  [72-85] 76  Resp:  [16] 16  BP: ()/(51-69) 109/69    Intake/Output Summary (Last 24 hours) at 04/06/17 0705  Last data filed at 04/06/17 0403   Gross per 24 hour   Intake                0 ml   Output             4590 ml   Net            -4590 ml     Physical Exam: dressing dry;  Neuro intact.        Results Review:     Lab Results   Component Value Date    WBC 18.12 (H) 04/05/2017    HGB 12.3 04/05/2017    HCT 36.9 04/05/2017    MCV 98.7 04/05/2017     04/05/2017     Lab Results   Component Value Date    GLUCOSE 94 03/26/2017    CALCIUM 9.9 07/17/2016     07/17/2016    K 3.6 07/17/2016    CO2 29.0 07/17/2016     07/17/2016    BUN 16 07/17/2016    CREATININE 0.90 07/17/2016    EGFRIFNONA 65 07/17/2016    BCR 17.8 07/17/2016    ANIONGAP 11.0 07/17/2016           Assessment/Plan     Active Problems:    Scoliosis deformity of spine      Plan:  Remove drain;  Elevate hop;  Ambulate;  Remove jackson      Plan for disposition      Connor Lopez MD  04/06/17  7:05 AM

## 2017-04-06 NOTE — PROGRESS NOTES
Acute Care - Physical Therapy Initial Evaluation   [facility]     Patient Name: Huma Montano  : 1960  MRN: [id number]  Today's Date: 2017   Onset of Illness/Injury or Date of Surgery Date: 17  Date of Referral to PT: 17  Referring Physician: MD John      Admit Date: 2017     Visit Dx:    ICD-10-CM ICD-9-CM   1. Impaired functional mobility, balance, gait, and endurance Z74.09 V49.89   2. Scoliosis (and kyphoscoliosis), idiopathic M41.20 737.30   3. Lumbar stenosis M48.06 724.02     Patient Active Problem List   Diagnosis   • Cervical pain   • Scoliosis deformity of spine     Past Medical History:   Diagnosis Date   • Arthritis    • Back pain    • Graves disease    • Kyphosis of cervical region    • Scoliosis    • Spinal stenosis    • Wears glasses      Past Surgical History:   Procedure Laterality Date   • BREAST BIOPSY Right 2016   •  SECTION     • CYST REMOVAL      Breast   • CYST REMOVAL      Cervical cyst   • HERNIA REPAIR     • LUMBAR LAMINECTOMY WITH FUSION N/A 2017    Procedure: L5-S1 OSTEOTOMY; L2-L3,L3-L4 AND L5-S1 INTERBODY FUSIONS; L2 TO S1 POSTERIOR FUSION WITH PEDICLE SCREWS AND BONE GRAFT;  Surgeon: Connor Lopez MD;  Location: [facility];  Service:    • OOPHORECTOMY     • ROTATOR CUFF REPAIR      3x   • SEPTOPLASTY     • SPINAL FUSION     • STEROID INJECTION      LUMBAR          PT ASSESSMENT (last 72 hours)      PT Evaluation       17 1105 17 1040    Rehab Evaluation    Document Type  evaluation  -LR    Subjective Information  agree to therapy;complains of;pain;numbness   numbness in lateral 2 toes of R foot  -LR    Evaluation Not Performed patient/family declined evaluation   Pt reports no needs for OT at this time; has all necessary equipment from previous back sx.  -EL     Patient Effort, Rehab Treatment  excellent  -LR    Symptoms Noted During/After Treatment  increased pain   headache  -LR    Symptoms Noted Comment   Notified RN of patient's c/o headache after therapy and after getting up to bathroom earlier this AM. Just off bedrest this AM due to dural tear.   -LR    General Information    Patient Profile Review  yes  -LR    Onset of Illness/Injury or Date of Surgery Date  04/04/17  -LR    Referring Physician  MD John  -LR    General Observations  Patient supine in bed upon arrival. IV, PCA, JEAN-PIERRE drain.   -LR    Pertinent History Of Current Problem  Patient presents for surgical management of persistent and progressive LBP that radiated down R LE and failed to improve with conservative management. Patient with prior L4-5 fusion in 2010. 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   -LR    Precautions/Limitations  fall precautions;spinal precautions   JEAN-PIERRE drain, PCA  -LR    Prior Level of Function  min assist:;all household mobility;community mobility;gait;transfer;bed mobility;home management;cooking;cleaning;shopping;using stairs;independent:;driving;work   limited cleaning; had to stop working 2 weeks prior to sx  -LR    Equipment Currently Used at Home  cane, straight;shower chair;walker, rolling;raised toilet   not currently using any equipment.   -LR    Plans/Goals Discussed With  patient;agreed upon  -LR    Risks Reviewed  patient:;LOB;nausea/vomiting;dizziness;increased discomfort  -LR    Benefits Reviewed  patient:;improve function;increase independence;increase strength;increase balance;decrease pain;increase knowledge  -LR    Barriers to Rehab  previous functional deficit  -LR    Living Environment    Lives With  alone  -LR    Living Arrangements  house   2 step home, plans to stay on first floor;   -LR    Home Accessibility  bed and bath on same level;stairs within home;tub/shower is not walk in  -LR    Number of Stairs to Enter Home  0   through garage  -LR    Number of Stairs Within Home  7   split level, 7 up to 2nd floor, 7 down to first floor  -LR    Stair  Railings at Home  inside, present on left side  -LR    Type of Financial/Environmental Concern  none  -LR    Transportation Available  family or friend will provide  -LR    Living Environment Comment  Patient reports her 2 daughters are going to alternate in assisting her at all times.   -LR    Clinical Impression    Date of Referral to PT  04/05/17  -LR    PT Diagnosis  lumbar scoliosis/stenosis/ s/p L5-S1 osteotomy, L2-L4 interbody fusion, L2-S1 posterior fusion with pedicle screws and bone graft.    bedrest POD#1 d/t dural tear per patient report  -LR    Prognosis  good  -LR    Patient/Family Goals Statement  go home, decrease pain  -LR    Criteria for Skilled Therapeutic Interventions Met  yes;treatment indicated  -LR    Rehab Potential  good, to achieve stated therapy goals  -LR    Pain Assessment    Pain Assessment  0-10  -LR    Pain Score  6  -LR    Post Pain Score  7  -LR    Pain Type  Acute pain;Surgical pain  -LR    Pain Location  Incision  -LR    Pain Intervention(s)  Repositioned;Ambulation/increased activity  -LR    Vision Assessment/Intervention    Visual Impairment  WFL  -LR    Cognitive Assessment/Intervention    Current Cognitive/Communication Assessment  functional  -LR    Orientation Status  oriented x 4;required verbal cueing (specifiy in comments)  -LR    Follows Commands/Answers Questions  100% of the time;able to follow single-step instructions;needs cueing  -LR    Personal Safety  WNL/WFL  -LR    ROM (Range of Motion)    General ROM  no range of motion deficits identified  -LR    MMT (Manual Muscle Testing)    General MMT Assessment  lower extremity strength deficits identified  -LR    Lower Extremity    Lower Ext Manual Muscle Testing Detail  B LE functionally 4/5  -LR    Bed Mobility, Assessment/Treatment    Bed Mobility, Assistive Device  head of bed elevated;bed rails  -LR    Bed Mob, Supine to Sit, [place]  verbal cues required;conditional independence  -LR    Bed Mob, Sit to  Supine, Will  verbal cues required;conditional independence  -LR    Bed Mobility, Impairments  strength decreased;pain  -LR    Bed Mobility, Comment  Patient required verbal cues for correct log roll technique. When transferring both out of bed and into bed, patient initiated the t/f incorrectly and required cues for log rolling.   -LR    Transfer Assessment/Treatment    Transfers, Sit-Stand Will  verbal cues required;stand by assist  -LR    Transfers, Stand-Sit Will  verbal cues required;stand by assist  -LR    Transfers, Sit-Stand-Sit, Assist Device  other (see comments)   IV pole  -LR    Transfer, Safety Issues  step length decreased  -LR    Transfer, Impairments  strength decreased;pain  -LR    Transfer, Comment  Verbal cues to push up from bed to stand and to reach back for bed to lower into sitting. Cues to limit trunk flexion during t/f.   -LR    Gait Assessment/Treatment    Gait, Will Level  verbal cues required;contact guard assist  -LR    Gait, Assistive Device  other (see comments)   IV pole  -LR    Gait, Distance (Feet)  240  -LR    Gait, Gait Pattern Analysis  swing-through gait  -LR    Gait, Gait Deviations  bilateral:;step length decreased  -LR    Gait, Safety Issues  step length decreased  -LR    Gait, Impairments  strength decreased;pain  -LR    Gait, Comment  Patient ambulated with step through gait pattern at slow pace. Verbal cues for upright posture. Patient used IV pole for UE support. No unsteadiness noted with gait. Patient c/o headache after mobility. Notified RN.   -LR    Therapy Exercises    Bilateral Lower Extremities  --   deferred, patient already initiated ther ex on her own  -LR    Sensory Assessment/Intervention    Sensory Impairment  --   numbness lateral 2 toes of R foot; light touch intact  -LR    Positioning and Restraints    Pre-Treatment Position  in bed  -LR    Post Treatment Position  bed  -LR    In Bed  notified nsg;supine;call light within  reach;encouraged to call for assist;side rails up x2  -LR      04/05/17 1122 04/05/17 0828    Rehab Evaluation    Evaluation Not Performed  unable to evaluate, medical status change   bedrest  -EH    General Information    Equipment Currently Used at Home none   has higher toilets installed  -SK     Living Environment    Lives With alone  -SK     Living Arrangements house  -SK     Transportation Available car;family or friend will provide  -SK       04/05/17 0821 04/04/17 0930    Rehab Evaluation    Evaluation Not Performed unable to evaluate, medical status change   Pt on bed rest this date, likely until tomorrow. Will reattempt as pt cleared for OOB activity.   -EL     General Information    Equipment Currently Used at Home  none  -TS    Living Environment    Lives With  alone  -TS    Living Arrangements  house  -TS    Home Accessibility  bed and bath on same level;stairs within home  -TS    Number of Stairs to Enter Home  0  -TS    Number of Stairs Within Home  14  -TS    Stair Railings at Home  inside, present on right side;inside, present on left side  -TS    Type of Financial/Environmental Concern  none  -TS    Transportation Available  car;family or friend will provide  -TS      User Key  (r) = Recorded By, (t) = Taken By, (c) = Cosigned By    Initials Name Provider Type    EH Selene Li, PT Physical Therapist    LR Radha Munoz, PT Physical Therapist    TS Anne Schmitt, RN Registered Nurse    SK Sonja C Kellerman, RN Case Manager    ISHA Shukla, OT Occupational Therapist          Physical Therapy Education     Title: PT OT SLP Therapies (Done)     Topic: Physical Therapy (Done)     Point: Mobility training (Done)    Learning Progress Summary    Learner Readiness Method Response Comment Documented by Status   Patient Acceptance E,H,D STEVIE,DIDI Issued and reviewed written/illustrated HEP and managing back pain booklet. Needs reinforcement regarding log roll technique. LR 04/06/17 7027  Done               Point: Home exercise program (Done)    Learning Progress Summary    Learner Readiness Method Response Comment Documented by Status   Patient Acceptance E,H,D VU,NR Issued and reviewed written/illustrated HEP and managing back pain booklet. Needs reinforcement regarding log roll technique. LR 04/06/17 1519 Done               Point: Body mechanics (Done)    Learning Progress Summary    Learner Readiness Method Response Comment Documented by Status   Patient Acceptance E,H,D VU,NR Issued and reviewed written/illustrated HEP and managing back pain booklet. Needs reinforcement regarding log roll technique. LR 04/06/17 1519 Done               Point: Precautions (Done)    Learning Progress Summary    Learner Readiness Method Response Comment Documented by Status   Patient Acceptance E,H,D VU,NR Issued and reviewed written/illustrated HEP and managing back pain booklet. Needs reinforcement regarding log roll technique.  04/06/17 1519 Done                      User Key     Initials Effective Dates Name Provider Type Discipline     06/19/15 -  Radha Munoz, PT Physical Therapist PT                PT Recommendation and Plan  Anticipated Equipment Needs At Discharge:  (none)  Anticipated Discharge Disposition: home with assist  Planned Therapy Interventions: balance training, bed mobility training, gait training, home exercise program, patient/family education, stair training, strengthening, transfer training  PT Frequency: daily  Plan of Care Review  Plan Of Care Reviewed With: patient  Progress: progress toward functional goals as expected  Outcome Summary/Follow up Plan: Patient ambulated 240 feet, c/o headache afterwards. Notified RN. Patient demonstrates decreased functional mobility status and strength. PT will follow to address these deficits to facilitate return to PLOF and increased level of independence.           IP PT Goals       04/06/17 1040          Bed Mobility PT LTG    Bed  Mobility PT LTG, Date Established 04/06/17  -LR      Bed Mobility PT LTG, Time to Achieve 5 days  -LR      Bed Mobility PT LTG, Activity Type supine to sit/sit to supine  -LR      Bed Mobility PT LTG, [place] Level independent  -LR      Bed Mobility PT LTG, Date Goal Reviewed 04/06/17  -LR      Bed Mobility PT LTG, Outcome goal ongoing  -LR      Transfer Training PT LTG    Transfer Training PT LTG, Date Established 04/06/17  -LR      Transfer Training PT LTG, Time to Achieve 5 days  -LR      Transfer Training PT LTG, Activity Type sit to stand/stand to sit  -LR      Transfer Training PT LTG, [place] Level independent  -LR      Transfer Training PT  LTG, Date Goal Reviewed 04/06/17  -LR      Transfer Training PT LTG, Outcome goal ongoing  -LR      Gait Training PT LTG    Gait Training Goal PT LTG, Date Established 04/06/17  -LR      Gait Training Goal PT LTG, Time to Achieve 5 days  -LR      Gait Training Goal PT LTG, [place] Level independent  -LR      Gait Training Goal PT LTG, Distance to Achieve 500 feet  -LR      Gait Training Goal PT LTG, Date Goal Reviewed 04/06/17  -LR      Gait Training Goal PT LTG, Outcome goal ongoing  -LR      Stair Training PT LTG    Stair Training Goal PT LTG, Date Established 04/06/17  -LR      Stair Training Goal PT LTG, Time to Achieve 5 days  -LR      Stair Training Goal PT LTG, Number of Steps 7  -LR      Stair Training Goal PT LTG, [place] Level contact guard assist  -LR      Stair Training Goal PT LTG, Assist Device 1 handrail  -LR      Stair Training Goal PT LTG, Date Goal Reviewed 04/06/17  -LR      Stair Training Goal PT LTG, Outcome goal ongoing  -LR        User Key  (r) = Recorded By, (t) = Taken By, (c) = Cosigned By    Initials Name Provider Type    LR Radha Munoz, PT Physical Therapist                Outcome Measures       04/06/17 1040          How much help from another person do you currently need...    Turning from your back to your side  while in flat bed without using bedrails? 4  -LR      Moving from lying on back to sitting on the side of a flat bed without bedrails? 4  -LR      Moving to and from a bed to a chair (including a wheelchair)? 3  -LR      Standing up from a chair using your arms (e.g., wheelchair, bedside chair)? 3  -LR      Climbing 3-5 steps with a railing? 3  -LR      To walk in hospital room? 3  -LR      AM-PAC 6 Clicks Score 20  -LR      Functional Assessment    Outcome Measure Options AM-PAC 6 Clicks Basic Mobility (PT)  -LR        User Key  (r) = Recorded By, (t) = Taken By, (c) = Cosigned By    Initials Name Provider Type    LR Radha Munoz, PT Physical Therapist           Time Calculation:         PT Charges       04/06/17 1522          Time Calculation    Start Time 1040  -LR      PT Received On 04/06/17  -LR      PT Goal Re-Cert Due Date 04/16/17  -LR      Time Calculation- PT    Total Timed Code Minutes- PT 15 minute(s)  -LR        User Key  (r) = Recorded By, (t) = Taken By, (c) = Cosigned By    Initials Name Provider Type    LR Radha Munoz, PT Physical Therapist          Therapy Charges for Today     Code Description Service Date Service Provider Modifiers Qty    47877955838 HC GAIT TRAINING EA 15 MIN 4/6/2017 Radha Munoz, PT GP 1    [id number] HC PT EVAL MOD COMPLEXITY 3 4/6/2017 Radha Munoz, PT GP 1          PT G-Codes  Outcome Measure Options: AM-PAC 6 Clicks Basic Mobility (PT)      Radha Munoz, PT  4/6/2017

## 2017-04-06 NOTE — PLAN OF CARE
Problem: Patient Care Overview (Adult)  Goal: Plan of Care Review  Outcome: Ongoing (interventions implemented as appropriate)  Goal: Adult Individualization and Mutuality  Outcome: Ongoing (interventions implemented as appropriate)  Goal: Discharge Needs Assessment  Outcome: Ongoing (interventions implemented as appropriate)    Problem: Perioperative Period (Adult)  Goal: Signs and Symptoms of Listed Potential Problems Will be Absent or Manageable (Perioperative Period)  Outcome: Ongoing (interventions implemented as appropriate)    Problem: Laminectomy/Foraminotomy/Discectomy (Adult)  Goal: Signs and Symptoms of Listed Potential Problems Will be Absent or Manageable (Laminectomy/Foraminotomy/Discectomy)  Outcome: Ongoing (interventions implemented as appropriate)

## 2017-04-06 NOTE — PLAN OF CARE
Problem: Patient Care Overview (Adult)  Goal: Plan of Care Review  Outcome: Ongoing (interventions implemented as appropriate)    04/06/17 1040   Coping/Psychosocial Response Interventions   Plan Of Care Reviewed With patient   Patient Care Overview   Progress progress toward functional goals as expected   Outcome Evaluation   Outcome Summary/Follow up Plan Patient ambulated 240 feet, c/o headache afterwards. Notified RN. Patient demonstrates decreased functional mobility status and strength. PT will follow to address these deficits to facilitate return to PLOF and increased level of independence.          Problem: Inpatient Physical Therapy  Goal: Bed Mobility Goal LTG- PT  Outcome: Ongoing (interventions implemented as appropriate)    04/06/17 1040   Bed Mobility PT LTG   Bed Mobility PT LTG, Date Established 04/06/17   Bed Mobility PT LTG, Time to Achieve 5 days   Bed Mobility PT LTG, Activity Type supine to sit/sit to supine   Bed Mobility PT LTG, [place] Level independent   Bed Mobility PT LTG, Date Goal Reviewed 04/06/17   Bed Mobility PT LTG, Outcome goal ongoing       Goal: Transfer Training Goal 1 LTG- PT  Outcome: Ongoing (interventions implemented as appropriate)    04/06/17 1040   Transfer Training PT LTG   Transfer Training PT LTG, Date Established 04/06/17   Transfer Training PT LTG, Time to Achieve 5 days   Transfer Training PT LTG, Activity Type sit to stand/stand to sit   Transfer Training PT LTG, [place] Level independent   Transfer Training PT LTG, Date Goal Reviewed 04/06/17   Transfer Training PT LTG, Outcome goal ongoing       Goal: Gait Training Goal LTG- PT  Outcome: Ongoing (interventions implemented as appropriate)    04/06/17 1040   Gait Training PT LTG   Gait Training Goal PT LTG, Date Established 04/06/17   Gait Training Goal PT LTG, Time to Achieve 5 days   Gait Training Goal PT LTG, [place] Level independent   Gait Training Goal PT LTG, Distance to Achieve 500 feet    Gait Training Goal PT LTG, Date Goal Reviewed 04/06/17   Gait Training Goal PT LTG, Outcome goal ongoing       Goal: Stair Training Goal LTG- PT  Outcome: Ongoing (interventions implemented as appropriate)    04/06/17 1040   Stair Training PT LTG   Stair Training Goal PT LTG, Date Established 04/06/17   Stair Training Goal PT LTG, Time to Achieve 5 days   Stair Training Goal PT LTG, Number of Steps 7   Stair Training Goal PT LTG, [place] Level contact guard assist   Stair Training Goal PT LTG, Assist Device 1 handrail   Stair Training Goal PT LTG, Date Goal Reviewed 04/06/17   Stair Training Goal PT LTG, Outcome goal ongoing

## 2017-04-07 LAB — BACTERIA SPEC AEROBE CULT: NORMAL

## 2017-04-07 PROCEDURE — 94799 UNLISTED PULMONARY SVC/PX: CPT

## 2017-04-07 PROCEDURE — 99024 POSTOP FOLLOW-UP VISIT: CPT | Performed by: NEUROLOGICAL SURGERY

## 2017-04-07 RX ORDER — PSEUDOEPHEDRINE HCL 30 MG
100 TABLET ORAL 2 TIMES DAILY PRN
Qty: 30 CAPSULE | Refills: 0 | Status: SHIPPED | OUTPATIENT
Start: 2017-04-07 | End: 2017-05-12

## 2017-04-07 RX ORDER — MORPHINE SULFATE 15 MG/1
15 TABLET, FILM COATED, EXTENDED RELEASE ORAL EVERY 12 HOURS SCHEDULED
Qty: 30 TABLET | Refills: 0
Start: 2017-04-07 | End: 2017-04-15

## 2017-04-07 RX ORDER — OXYCODONE AND ACETAMINOPHEN 10; 325 MG/1; MG/1
1 TABLET ORAL EVERY 4 HOURS PRN
Qty: 120 TABLET | Refills: 0
Start: 2017-04-07 | End: 2017-04-14

## 2017-04-07 RX ADMIN — GABAPENTIN 300 MG: 300 CAPSULE ORAL at 20:42

## 2017-04-07 RX ADMIN — GABAPENTIN 300 MG: 300 CAPSULE ORAL at 13:13

## 2017-04-07 RX ADMIN — MORPHINE SULFATE 15 MG: 15 TABLET, EXTENDED RELEASE ORAL at 20:41

## 2017-04-07 RX ADMIN — TIZANIDINE 4 MG: 4 TABLET ORAL at 02:45

## 2017-04-07 RX ADMIN — OXYCODONE HYDROCHLORIDE AND ACETAMINOPHEN 1 TABLET: 10; 325 TABLET ORAL at 20:49

## 2017-04-07 RX ADMIN — FERROUS SULFATE TAB 325 MG (65 MG ELEMENTAL FE) 325 MG: 325 (65 FE) TAB at 17:07

## 2017-04-07 RX ADMIN — Medication: at 01:25

## 2017-04-07 RX ADMIN — GABAPENTIN 300 MG: 300 CAPSULE ORAL at 21:00

## 2017-04-07 RX ADMIN — GABAPENTIN 300 MG: 300 CAPSULE ORAL at 05:51

## 2017-04-07 RX ADMIN — OXYCODONE HYDROCHLORIDE AND ACETAMINOPHEN 1 TABLET: 10; 325 TABLET ORAL at 08:07

## 2017-04-07 RX ADMIN — OXYCODONE HYDROCHLORIDE AND ACETAMINOPHEN 1 TABLET: 10; 325 TABLET ORAL at 13:13

## 2017-04-07 RX ADMIN — FERROUS SULFATE TAB 325 MG (65 MG ELEMENTAL FE) 325 MG: 325 (65 FE) TAB at 08:07

## 2017-04-07 RX ADMIN — TIZANIDINE 4 MG: 4 TABLET ORAL at 08:12

## 2017-04-07 RX ADMIN — OXYCODONE HYDROCHLORIDE AND ACETAMINOPHEN 1 TABLET: 10; 325 TABLET ORAL at 02:51

## 2017-04-07 RX ADMIN — DULOXETINE 60 MG: 60 CAPSULE, DELAYED RELEASE ORAL at 20:42

## 2017-04-07 RX ADMIN — Medication 1 TABLET: at 08:07

## 2017-04-07 RX ADMIN — TIZANIDINE 4 MG: 4 TABLET ORAL at 17:07

## 2017-04-07 RX ADMIN — Medication: at 11:39

## 2017-04-07 RX ADMIN — MORPHINE SULFATE 15 MG: 15 TABLET, EXTENDED RELEASE ORAL at 08:07

## 2017-04-07 RX ADMIN — ACETAMINOPHEN 650 MG: 325 TABLET, FILM COATED ORAL at 20:41

## 2017-04-07 RX ADMIN — LEVOTHYROXINE SODIUM 112 MCG: 112 TABLET ORAL at 05:51

## 2017-04-07 RX ADMIN — ACETAMINOPHEN 650 MG: 325 TABLET, FILM COATED ORAL at 08:07

## 2017-04-07 RX ADMIN — Medication: at 20:42

## 2017-04-07 RX ADMIN — ACETAMINOPHEN 650 MG: 325 TABLET, FILM COATED ORAL at 15:54

## 2017-04-07 RX ADMIN — OXYCODONE HYDROCHLORIDE AND ACETAMINOPHEN 1 TABLET: 10; 325 TABLET ORAL at 17:11

## 2017-04-07 NOTE — PLAN OF CARE
Problem: Laminectomy/Foraminotomy/Discectomy (Adult)  Goal: Signs and Symptoms of Listed Potential Problems Will be Absent or Manageable (Laminectomy/Foraminotomy/Discectomy)  Outcome: Ongoing (interventions implemented as appropriate)    04/07/17 1800   Laminectomy/Foraminotomy/Discectomy   Problems Assessed (Laminectomy/Laminotomy/Discectomy) all   Problems Present (Laminectomy/Laminotomy/Discectomy) pain

## 2017-04-08 VITALS
BODY MASS INDEX: 26.53 KG/M2 | HEART RATE: 76 BPM | SYSTOLIC BLOOD PRESSURE: 99 MMHG | OXYGEN SATURATION: 95 % | RESPIRATION RATE: 16 BRPM | WEIGHT: 169 LBS | DIASTOLIC BLOOD PRESSURE: 64 MMHG | HEIGHT: 67 IN | TEMPERATURE: 98.1 F

## 2017-04-08 LAB
ABO + RH BLD: NORMAL
ABO + RH BLD: NORMAL
BH BB BLOOD EXPIRATION DATE: NORMAL
BH BB BLOOD EXPIRATION DATE: NORMAL
BH BB BLOOD TYPE BARCODE: 5100
BH BB BLOOD TYPE BARCODE: 5100
BH BB DISPENSE STATUS: NORMAL
BH BB DISPENSE STATUS: NORMAL
BH BB PRODUCT CODE: NORMAL
BH BB PRODUCT CODE: NORMAL
BH BB UNIT NUMBER: NORMAL
BH BB UNIT NUMBER: NORMAL
CROSSMATCH INTERPRETATION: NORMAL
CROSSMATCH INTERPRETATION: NORMAL
UNIT  ABO: NORMAL
UNIT  ABO: NORMAL
UNIT  RH: NORMAL
UNIT  RH: NORMAL

## 2017-04-08 PROCEDURE — 99024 POSTOP FOLLOW-UP VISIT: CPT | Performed by: PHYSICIAN ASSISTANT

## 2017-04-08 RX ADMIN — TIZANIDINE 4 MG: 4 TABLET ORAL at 01:40

## 2017-04-08 RX ADMIN — OXYCODONE HYDROCHLORIDE AND ACETAMINOPHEN 1 TABLET: 10; 325 TABLET ORAL at 11:43

## 2017-04-08 RX ADMIN — TIZANIDINE 4 MG: 4 TABLET ORAL at 11:43

## 2017-04-08 RX ADMIN — MORPHINE SULFATE 15 MG: 15 TABLET, EXTENDED RELEASE ORAL at 08:09

## 2017-04-08 RX ADMIN — LEVOTHYROXINE SODIUM 112 MCG: 112 TABLET ORAL at 04:57

## 2017-04-08 RX ADMIN — FERROUS SULFATE TAB 325 MG (65 MG ELEMENTAL FE) 325 MG: 325 (65 FE) TAB at 08:09

## 2017-04-08 RX ADMIN — OXYCODONE HYDROCHLORIDE AND ACETAMINOPHEN 1 TABLET: 10; 325 TABLET ORAL at 04:56

## 2017-04-08 RX ADMIN — GABAPENTIN 300 MG: 300 CAPSULE ORAL at 04:57

## 2017-04-08 RX ADMIN — OXYCODONE HYDROCHLORIDE AND ACETAMINOPHEN 1 TABLET: 10; 325 TABLET ORAL at 01:41

## 2017-04-08 RX ADMIN — Medication 1 TABLET: at 08:09

## 2017-04-08 RX ADMIN — ACETAMINOPHEN 650 MG: 325 TABLET, FILM COATED ORAL at 08:09

## 2017-04-08 NOTE — DISCHARGE SUMMARY
[facility]    Date of Admission: 4/4/2017  Date of Discharge:  4/8/2017    Discharge Diagnosis: lumbar scoliosis      Procedures Performed  Procedure(s):  L5-S1 OSTEOTOMY; L2-L3,L3-L4 AND L5-S1 INTERBODY FUSIONS; L2 TO S1 POSTERIOR FUSION WITH PEDICLE SCREWS AND BONE GRAFT       Presenting Problem/History of Present Illness  Scoliosis deformity of spine [M41.9]     Hospital Course  Patient is a 56 y.o. female s/p spinal reconstructive surgery on 4/4/2017. Dural repair at surgery. No headache today, wound dry and flat. Ambulating, voiding, pain under much better control, no foot drop, continued numbness in the  4-5 right toes.    Condition on Discharge:  good    Discharge Disposition  Home or Self Care    Discharge Medications   Huma Montano   Home Medication Instructions CHERRI:[id number]    Printed on:04/08/17 1134   Medication Information                      amphetamine-dextroamphetamine XR (ADDERALL XR) 25 MG 24 hr capsule  Take 25 mg by mouth every morning.             aspirin 81 MG EC tablet  Take 1 tablet by mouth daily.             Cholecalciferol (VITAMIN D-3 PO)  Take 1 tablet by mouth Daily.             docusate sodium 100 MG capsule  Take 100 mg by mouth 2 (Two) Times a Day As Needed for Constipation.             DULoxetine (CYMBALTA) 60 MG capsule  Take 60 mg by mouth Daily.             estradiol (MINIVELLE, VIVELLE-DOT) 0.05 MG/24HR patch  Place 1 patch on the skin 2 (Two) Times a Week. ON WED AND SATURDAYS 4-4-2017 Currently on left hip             gabapentin (NEURONTIN) 300 MG capsule  Take 1 capsule by mouth 3 (Three) Times a Day.             levothyroxine (SYNTHROID, LEVOTHROID) 112 MCG tablet  Take 112 mcg by mouth daily.             meloxicam (MOBIC) 15 MG tablet  Take 15 mg by mouth Daily. WITH FOOD             Morphine (MS CONTIN) 15 MG 12 hr tablet  Take 1 tablet by mouth Every 12 (Twelve) Hours for 8 days.             Multiple  Vitamins-Minerals (MULTIVITAL PO)  Take 1 tablet by mouth Daily.             ondansetron (ZOFRAN) 4 MG tablet  Take 4 mg by mouth Every 4 (Four) Hours As Needed for Nausea or Vomiting (CAN TAKE EVERY 4-6 HRS PRN).             oxyCODONE-acetaminophen (PERCOCET)  MG per tablet  Take 1 tablet by mouth Every 4 (Four) Hours As Needed for Severe Pain (7-10) for up to 7 days.             tiZANidine (ZANAFLEX) 4 MG tablet  Take 8 mg by mouth At Night As Needed for Muscle Spasms (CAN TAKE 2 TABS AT BEDTIME, EACH TAB 4MG).                   Activity at Discharge:   Activity Instructions     As instructed per physical therapy and Dr. Lopez               Follow-up Appointments  Future Appointments  Date Time Provider Department Center   4/21/2017 11:00 AM Renetta Milan PA-C [facility] None     Additional Instructions for the Follow-ups that You Need to Schedule     Call MD for problems / concerns.    As directed    Instructions: HA or wound swelling or drainage.       Discharge Follow-Up With Specified Provider    As directed    To:  Kathrin Milan   Follow Up Details:  staple removal, wed, April 19th, am appointment with Kathrin Milan, PAC                 Referring Provider  Connor Lopez MD    PCP   MD Lucie Chen PA-C  04/08/17  11:33 AM

## 2017-04-08 NOTE — PLAN OF CARE
Problem: Patient Care Overview (Adult)  Goal: Plan of Care Review  Outcome: Outcome(s) achieved Date Met:  04/08/17 04/08/17 0929   Coping/Psychosocial Response Interventions   Plan Of Care Reviewed With patient   Patient Care Overview   Progress improving   Outcome Evaluation   Outcome Summary/Follow up Plan Pt refuses PT tx at this time; already performing ther ex program and ambulation independently. Pt is d/cing today and reports confidence without further PT at this time.         Problem: Inpatient Physical Therapy  Goal: Bed Mobility Goal LTG- PT  Outcome: Outcome(s) achieved Date Met:  04/08/17 04/06/17 1040 04/08/17 0929   Bed Mobility PT LTG   Bed Mobility PT LTG, Date Established 04/06/17 --    Bed Mobility PT LTG, Time to Achieve 5 days --    Bed Mobility PT LTG, Activity Type supine to sit/sit to supine --    Bed Mobility PT LTG, [place] Level independent --    Bed Mobility PT LTG, Date Goal Reviewed 04/06/17 --    Bed Mobility PT LTG, Outcome --  goal met       Goal: Transfer Training Goal 1 LTG- PT  Outcome: Outcome(s) achieved Date Met:  04/08/17 04/06/17 1040 04/08/17 0929   Transfer Training PT LTG   Transfer Training PT LTG, Date Established 04/06/17 --    Transfer Training PT LTG, Time to Achieve 5 days --    Transfer Training PT LTG, Activity Type sit to stand/stand to sit --    Transfer Training PT LTG, [place] Level independent --    Transfer Training PT LTG, Date Goal Reviewed 04/06/17 --    Transfer Training PT LTG, Outcome --  goal met       Goal: Gait Training Goal LTG- PT  Outcome: Unable to achieve outcome(s) by discharge Date Met:  04/08/17 04/06/17 1040 04/08/17 0929   Gait Training PT LTG   Gait Training Goal PT LTG, Date Established 04/06/17 --    Gait Training Goal PT LTG, Time to Achieve 5 days --    Gait Training Goal PT LTG, [place] Level independent --    Gait Training Goal PT LTG, Distance to Achieve 500 feet --    Gait Training Goal PT LTG, Date Goal  Reviewed 04/06/17 --    Gait Training Goal PT LTG, Outcome --  goal not met       Goal: Stair Training Goal LTG- PT  Outcome: Unable to achieve outcome(s) by discharge Date Met:  04/08/17 04/06/17 1040 04/08/17 0929   Stair Training PT LTG   Stair Training Goal PT LTG, Date Established 04/06/17 --    Stair Training Goal PT LTG, Time to Achieve 5 days --    Stair Training Goal PT LTG, Number of Steps 7 --    Stair Training Goal PT LTG, [place] Level contact guard assist --    Stair Training Goal PT LTG, Assist Device 1 handrail --    Stair Training Goal PT LTG, Date Goal Reviewed 04/06/17 --    Stair Training Goal PT LTG, Outcome --  goal not met

## 2017-04-08 NOTE — THERAPY DISCHARGE NOTE
Acute Care - Physical Therapy Discharge Summary  [facility]       Patient Name: Huma Montano  : 1960  MRN: [id number]    Today's Date: 2017  Onset of Illness/Injury or Date of Surgery Date: 17    Date of Referral to PT: 17  Referring Physician: MD John      Admit Date: 2017      PT Recommendation and Plan    Visit Dx:    ICD-10-CM ICD-9-CM   1. Impaired functional mobility, balance, gait, and endurance Z74.09 V49.89   2. Scoliosis (and kyphoscoliosis), idiopathic M41.20 737.30   3. Lumbar stenosis M48.06 724.02             Outcome Measures       17 1040          How much help from another person do you currently need...    Turning from your back to your side while in flat bed without using bedrails? 4  -LR      Moving from lying on back to sitting on the side of a flat bed without bedrails? 4  -LR      Moving to and from a bed to a chair (including a wheelchair)? 3  -LR      Standing up from a chair using your arms (e.g., wheelchair, bedside chair)? 3  -LR      Climbing 3-5 steps with a railing? 3  -LR      To walk in hospital room? 3  -LR      AM-PAC 6 Clicks Score 20  -LR      Functional Assessment    Outcome Measure Options AM-PAC 6 Clicks Basic Mobility (PT)  -LR        User Key  (r) = Recorded By, (t) = Taken By, (c) = Cosigned By    Initials Name Provider Type    LR Radha Munoz, PT Physical Therapist                      IP PT Goals       17 0929 17 1040       Bed Mobility PT LTG    Bed Mobility PT LTG, Date Established  17  -LR     Bed Mobility PT LTG, Time to Achieve  5 days  -LR     Bed Mobility PT LTG, Activity Type  supine to sit/sit to supine  -LR     Bed Mobility PT LTG, [place] Level  independent  -LR     Bed Mobility PT LTG, Date Goal Reviewed  17  -LR     Bed Mobility PT LTG, Outcome goal met  -EH goal ongoing  -LR     Transfer Training PT LTG    Transfer Training PT LTG, Date Established  17  -LR     Transfer  Training PT LTG, Time to Achieve  5 days  -LR     Transfer Training PT LTG, Activity Type  sit to stand/stand to sit  -LR     Transfer Training PT LTG, [place] Level  independent  -LR     Transfer Training PT  LTG, Date Goal Reviewed  04/06/17  -LR     Transfer Training PT LTG, Outcome goal met  -EH goal ongoing  -LR     Gait Training PT LTG    Gait Training Goal PT LTG, Date Established  04/06/17  -LR     Gait Training Goal PT LTG, Time to Achieve  5 days  -LR     Gait Training Goal PT LTG, [place] Level  independent  -LR     Gait Training Goal PT LTG, Distance to Achieve  500 feet  -LR     Gait Training Goal PT LTG, Date Goal Reviewed  04/06/17  -LR     Gait Training Goal PT LTG, Outcome goal not met  -EH goal ongoing  -LR     Stair Training PT LTG    Stair Training Goal PT LTG, Date Established  04/06/17  -LR     Stair Training Goal PT LTG, Time to Achieve  5 days  -LR     Stair Training Goal PT LTG, Number of Steps  7  -LR     Stair Training Goal PT LTG, [place] Level  contact guard assist  -LR     Stair Training Goal PT LTG, Assist Device  1 handrail  -LR     Stair Training Goal PT LTG, Date Goal Reviewed  04/06/17  -LR     Stair Training Goal PT LTG, Outcome goal not met  - goal ongoing  -LR       User Key  (r) = Recorded By, (t) = Taken By, (c) = Cosigned By    Initials Name Provider Type    CHANDLER Li, PT Physical Therapist    LR Radha Munoz, PT Physical Therapist              PT Discharge Summary  Reason for Discharge: Discharge from facility, Independent  Outcomes Achieved: Other (reports independence/achievement of goals)  Discharge Destination: Home with assist      Selene Li, PT   4/8/2017

## 2017-04-19 DIAGNOSIS — Z98.1 STATUS POST LUMBAR SPINAL FUSION: Primary | ICD-10-CM

## 2017-04-19 NOTE — TELEPHONE ENCOUNTER
The order in Epic should suffice, I've used this with Fredrick before, it just needs to be signed and it will print like a prescription that we can fax or mail to the patient. I have filled out the necessary info and associated a diagnosis.

## 2017-04-19 NOTE — TELEPHONE ENCOUNTER
Provider:  John  Caller: Huma  Time of call:   1:02  Phone #:  [phone]  Surgery:  L2-L3,L3-L4 AND L5-S1 INTERBODY FUSIONS; L2 TO S1 POSTERIOR FUSION  Surgery Date:  4/4/17  Last visit:  2/10/17   Next visit: 4/21/17    NIKOLE:         Reason for call:       Pt states that she originally declined the Rx for a walk, but would now like to have this device to help her get around better. States she has increased pain, and difficulty getting around, would like to utilize this Rx, states that can be faxed to [facility], with Attn to Christina

## 2017-04-20 NOTE — TELEPHONE ENCOUNTER
Order signed, no fax # listed. Called pt, she gave me fax # [phone]. Pt wanted me to document that she was 5'6 on the fax cover sheet as well as the diagnosis code. Adv pt that I would fax today and to just give us a call if they have any problems, she understood.

## 2017-04-21 ENCOUNTER — OFFICE VISIT (OUTPATIENT)
Dept: NEUROSURGERY | Facility: CLINIC | Age: 57
End: 2017-04-21

## 2017-04-21 VITALS
DIASTOLIC BLOOD PRESSURE: 84 MMHG | HEIGHT: 67 IN | TEMPERATURE: 97.1 F | WEIGHT: 172 LBS | BODY MASS INDEX: 27 KG/M2 | SYSTOLIC BLOOD PRESSURE: 128 MMHG

## 2017-04-21 DIAGNOSIS — M41.07 INFANTILE IDIOPATHIC SCOLIOSIS OF LUMBOSACRAL REGION: Primary | ICD-10-CM

## 2017-04-21 PROCEDURE — 99024 POSTOP FOLLOW-UP VISIT: CPT | Performed by: PHYSICIAN ASSISTANT

## 2017-04-21 NOTE — PROGRESS NOTES
PT NAME: Huma Montano   : 1960   Date of Service: 2017       CC: Low back soreness, right hip pain and soreness, right leg pain.       HPI: Ms. Montano is a 56 year old woman who is seen today in follow-up status post left L2-L3 transforaminal lumbar interbody fusion with capstone cage, right L3-L4 transforaminal lumbar interbody fusion with capstone cage, and L2 to S1 segmental posterior pedicle screw fixation on 2017.  Prior to surgery, she presented with a long history of low back pain and right leg pain.  She previously had an L4-L5 posterior fusion for degenerative spondylolisthesis.  She subsequently developed a degenerative scoliosis as well as lateral recess stenosis and foraminal stenosis at L5-S1.    Today, Ms. Montano states that overall she is better than she was prior to surgery.  She still has a fair amount of right hip pain and right leg pain.  She states that she overdoes things at times.  She also describes and increase of a dysesthetic type pain in her right hip and upper thigh.  She is currently on Neurontin 100 mg 3 times a day.  She is also experiencing some intermittent left leg pain.  She is utilizing a walker for assistance with ambulation.  She is unable to take Percocet and is currently taking tramadol given to her by her pain management physician.  She feels the tramadol is helping better than the Percocet.  She has not had any difficulty with her surgical incision.      Review of Systems   Constitutional: Positive for activity change. Negative for appetite change, chills, diaphoresis, fatigue, fever and unexpected weight change.   HENT: Negative for congestion, dental problem, drooling, ear discharge, ear pain, facial swelling, hearing loss, mouth sores, nosebleeds, postnasal drip, rhinorrhea, sinus pressure, sneezing, sore throat, tinnitus, trouble swallowing and voice change.    Eyes: Negative for photophobia, pain, discharge, redness, itching and visual  disturbance.   Respiratory: Negative for apnea, cough, choking, chest tightness, shortness of breath, wheezing and stridor.    Cardiovascular: Negative for chest pain, palpitations and leg swelling.   Gastrointestinal: Negative for abdominal distention, abdominal pain, anal bleeding, blood in stool, constipation, diarrhea, nausea, rectal pain and vomiting.   Endocrine: Negative for cold intolerance, heat intolerance, polydipsia, polyphagia and polyuria.   Genitourinary: Negative for decreased urine volume, difficulty urinating, dysuria, enuresis, flank pain, frequency, genital sores, hematuria and urgency.   Musculoskeletal: Positive for arthralgias, back pain, gait problem, myalgias and neck stiffness. Negative for joint swelling and neck pain.   Skin: Negative for color change, pallor, rash and wound.   Allergic/Immunologic: Positive for environmental allergies. Negative for food allergies and immunocompromised state.   Neurological: Positive for weakness and numbness. Negative for dizziness, tremors, seizures, syncope, facial asymmetry, speech difficulty, light-headedness and headaches.   Hematological: Negative for adenopathy. Does not bruise/bleed easily.   Psychiatric/Behavioral: Negative for agitation, behavioral problems, confusion, decreased concentration, dysphoric mood, hallucinations, self-injury, sleep disturbance and suicidal ideas. The patient is not nervous/anxious and is not hyperactive.        PHYSICAL EXAM:   Vitals:    04/21/17 1106   BP: 128/84   Temp: 97.1 °F (36.2 °C)     Examination of her surgical incision reveals it to be intact and nicely healing.  There is no evidence of erythema, edema, or induration.  Her incision was prepped with alcohol and staples as well as drain suture were removed without difficulty.  No dehiscence or drainage noted from the incision following staple removal.  She is able to rise from a sitting to standing position without significant difficulty.  Motor strength  is grossly intact in the lower extremities.      PLAN: Activity restrictions as well as exercise and signs of infection were discussed with Ms. Montano today.  She was given a prescription for tramadol 50 mg 2 by mouth 3 times a day when necessary pain #90 with no refill.  She currently has enough tramadol from her pain management physician to last until early May.  We will treat her postoperative pain.  She will follow-up with me in 3-4 weeks for reevaluation at which time further recommendations will be made.      Renetta Milan PA-C

## 2017-04-24 DIAGNOSIS — M54.2 CERVICALGIA: ICD-10-CM

## 2017-04-24 DIAGNOSIS — M47.812 CERVICAL SPONDYLOSIS WITHOUT MYELOPATHY: ICD-10-CM

## 2017-04-24 DIAGNOSIS — M47.814 THORACIC SPONDYLOSIS WITHOUT MYELOPATHY: ICD-10-CM

## 2017-04-24 DIAGNOSIS — M54.6 PAIN IN THORACIC SPINE: ICD-10-CM

## 2017-04-24 RX ORDER — GABAPENTIN 300 MG/1
300 CAPSULE ORAL 3 TIMES DAILY
Qty: 90 CAPSULE | Refills: 3 | Status: SHIPPED | OUTPATIENT
Start: 2017-04-24 | End: 2017-05-12 | Stop reason: DRUGHIGH

## 2017-04-24 NOTE — TELEPHONE ENCOUNTER
Provider:  John  Caller: Huma Montano  Time of call:   02:21 PM  Phone #:  [phone]  Surgery:  L5-S1 OSTEOTOMY; L2-L3,L3-L4 AND L5-S1 INTERBODY FUSIONS; L2 TO S1 POSTERIOR FUSION WITH PEDICLE SCREWS AND BONE GRAFT  Surgery Date:  04/04/17  Last visit:   04/21/17  Next visit: 05/12    Reason for call:       Pt requesting refill of Gabapentin
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
## 2017-05-12 ENCOUNTER — OFFICE VISIT (OUTPATIENT)
Dept: NEUROSURGERY | Facility: CLINIC | Age: 57
End: 2017-05-12

## 2017-05-12 VITALS
WEIGHT: 175 LBS | HEART RATE: 81 BPM | SYSTOLIC BLOOD PRESSURE: 132 MMHG | HEIGHT: 67 IN | OXYGEN SATURATION: 98 % | BODY MASS INDEX: 27.47 KG/M2 | DIASTOLIC BLOOD PRESSURE: 82 MMHG | TEMPERATURE: 97.8 F

## 2017-05-12 DIAGNOSIS — M41.9 SCOLIOSIS OF LUMBOSACRAL SPINE, UNSPECIFIED SCOLIOSIS TYPE: Primary | ICD-10-CM

## 2017-05-12 DIAGNOSIS — M47.814 THORACIC SPONDYLOSIS WITHOUT MYELOPATHY: ICD-10-CM

## 2017-05-12 DIAGNOSIS — M54.6 PAIN IN THORACIC SPINE: ICD-10-CM

## 2017-05-12 DIAGNOSIS — M54.2 CERVICALGIA: ICD-10-CM

## 2017-05-12 DIAGNOSIS — M47.812 CERVICAL SPONDYLOSIS WITHOUT MYELOPATHY: ICD-10-CM

## 2017-05-12 PROCEDURE — 99024 POSTOP FOLLOW-UP VISIT: CPT | Performed by: PHYSICIAN ASSISTANT

## 2017-05-12 RX ORDER — GABAPENTIN 300 MG/1
CAPSULE ORAL
Qty: 90 CAPSULE | Refills: 2 | Status: SHIPPED | OUTPATIENT
Start: 2017-05-12 | End: 2017-06-02 | Stop reason: SDUPTHER

## 2017-06-02 RX ORDER — GABAPENTIN 300 MG/1
CAPSULE ORAL
Qty: 90 CAPSULE | Refills: 2 | Status: SHIPPED | OUTPATIENT
Start: 2017-06-02 | End: 2018-02-20 | Stop reason: DRUGHIGH

## 2017-06-02 NOTE — TELEPHONE ENCOUNTER
Provider:  John  Caller: Huma  Time of call:     Phone #:  [phone]  Surgery:  L5-S1 INTERBODY FUSIONS; L2 TO S1 POSTERIOR FUSION  Surgery Date:  4/417  Last visit:   5/12/17  Next visit: 6/23/17    NIKOLE:         Reason for call:         Gabapentin refill

## 2017-06-16 ENCOUNTER — HOSPITAL ENCOUNTER (OUTPATIENT)
Dept: GENERAL RADIOLOGY | Facility: HOSPITAL | Age: 57
Discharge: HOME | End: 2017-06-16
Admitting: PHYSICIAN ASSISTANT

## 2017-06-16 DIAGNOSIS — M41.9 SCOLIOSIS OF LUMBOSACRAL SPINE, UNSPECIFIED SCOLIOSIS TYPE: ICD-10-CM

## 2017-06-16 PROCEDURE — 72100 X-RAY EXAM L-S SPINE 2/3 VWS: CPT

## 2017-06-21 ENCOUNTER — OFFICE VISIT (OUTPATIENT)
Dept: NEUROSURGERY | Facility: CLINIC | Age: 57
End: 2017-06-21

## 2017-06-21 VITALS
BODY MASS INDEX: 27 KG/M2 | HEIGHT: 67 IN | HEART RATE: 88 BPM | TEMPERATURE: 97.7 F | OXYGEN SATURATION: 98 % | SYSTOLIC BLOOD PRESSURE: 122 MMHG | DIASTOLIC BLOOD PRESSURE: 84 MMHG | WEIGHT: 172 LBS

## 2017-06-21 DIAGNOSIS — M47.817 LUMBOSACRAL SPONDYLOSIS WITHOUT MYELOPATHY: ICD-10-CM

## 2017-06-21 DIAGNOSIS — M47.814 THORACIC SPONDYLOSIS WITHOUT MYELOPATHY: ICD-10-CM

## 2017-06-21 DIAGNOSIS — M54.6 PAIN IN THORACIC SPINE: ICD-10-CM

## 2017-06-21 DIAGNOSIS — M54.2 CERVICALGIA: ICD-10-CM

## 2017-06-21 DIAGNOSIS — M41.9 SCOLIOSIS OF LUMBOSACRAL SPINE, UNSPECIFIED SCOLIOSIS TYPE: Primary | ICD-10-CM

## 2017-06-21 DIAGNOSIS — M41.20 SCOLIOSIS (AND KYPHOSCOLIOSIS), IDIOPATHIC: ICD-10-CM

## 2017-06-21 DIAGNOSIS — G89.29 CHRONIC BILATERAL LOW BACK PAIN WITH RIGHT-SIDED SCIATICA: ICD-10-CM

## 2017-06-21 DIAGNOSIS — M54.41 CHRONIC BILATERAL LOW BACK PAIN WITH RIGHT-SIDED SCIATICA: ICD-10-CM

## 2017-06-21 DIAGNOSIS — M54.31 SCIATICA OF RIGHT SIDE: ICD-10-CM

## 2017-06-21 DIAGNOSIS — Z98.1 STATUS POST LUMBAR SPINAL FUSION: ICD-10-CM

## 2017-06-21 DIAGNOSIS — G89.29 CHRONIC BILATERAL LOW BACK PAIN WITHOUT SCIATICA: ICD-10-CM

## 2017-06-21 DIAGNOSIS — M54.50 CHRONIC BILATERAL LOW BACK PAIN WITHOUT SCIATICA: ICD-10-CM

## 2017-06-21 DIAGNOSIS — M47.812 CERVICAL SPONDYLOSIS WITHOUT MYELOPATHY: ICD-10-CM

## 2017-06-21 DIAGNOSIS — M41.07 INFANTILE IDIOPATHIC SCOLIOSIS OF LUMBOSACRAL REGION: ICD-10-CM

## 2017-06-21 PROCEDURE — 99024 POSTOP FOLLOW-UP VISIT: CPT | Performed by: NEUROLOGICAL SURGERY

## 2017-06-21 RX ORDER — TRAMADOL HYDROCHLORIDE 50 MG/1
TABLET ORAL
COMMUNITY
Start: 2017-06-17 | End: 2017-07-17 | Stop reason: ALTCHOICE

## 2017-06-21 NOTE — PROGRESS NOTES
Patient: Huma Montano  :  1960  Chart #:  [id number]    Date of Service: 17    Chief Complaint:   Chief Complaint   Patient presents with   • Post-op       Back Pain   Chronicity: Patient returns today for a follow-up on her Left L2-L3 transformainal lumbar interbody fusion with capstone cage, and a right L3-L4 transforaminal lumbar interbody fusion and a L2-S1 segmental posterior screw fixation on 17. Episode onset: Patient has some stiffness in her back.  She states she still has some problems walking.   The problem occurs rarely. The problem has been gradually improving since onset. The pain is present in the lumbar spine. Quality: She has some residual pain in her left foot, however she is doing much better. The pain is at a severity of 3/10. The pain is mild (Her pain is mild.  She did discontinue Norco, and she is still taking Meloxicam.). Associated symptoms include abdominal pain. Risk factors include sedentary lifestyle and lack of exercise (She is hypersensative today.  She states that her lower extremity pain is better.). She has tried ice, analgesics and bed rest (I have encouraged her to start stretching.  She has a treadmill, so she will begin using that.) for the symptoms. The treatment provided moderate relief.     She is doing better;  She has lumbar region back pain but has had resolution of leg pain.    Radiographic Images:   Lumbar spin x-rays from 17 were reviewed with the patient and show minimal scoliosis concave to L with apex at L1L2.  Sagittal alignment looks good.      Past Medical History:   Diagnosis Date   • Arthritis    • Back pain    • Graves disease    • Kyphosis of cervical region    • Scoliosis    • Spinal stenosis    • Wears glasses      Current Outpatient Prescriptions   Medication Sig Dispense Refill   • amphetamine-dextroamphetamine XR (ADDERALL XR) 25 MG 24 hr capsule Take 25 mg by mouth every morning.     • aspirin 81 MG EC tablet Take 1 tablet by  "mouth daily. 30 tablet 0   • DULoxetine (CYMBALTA) 60 MG capsule Take 60 mg by mouth Daily.     • estradiol (MINIVELLE, VIVELLE-DOT) 0.05 MG/24HR patch Place 1 patch on the skin 2 (Two) Times a Week. ON WED AND  Currently on left hip     • gabapentin (NEURONTIN) 300 MG capsule 1 capsule in AM, 2 capsules midday, 2 capsules at hs 90 capsule 2   • levothyroxine (SYNTHROID, LEVOTHROID) 112 MCG tablet Take 112 mcg by mouth daily.     • meloxicam (MOBIC) 15 MG tablet Take 15 mg by mouth Daily. WITH FOOD     • Multiple Vitamins-Minerals (MULTIVITAL PO) Take 1 tablet by mouth Daily.     • ondansetron (ZOFRAN) 4 MG tablet Take 4 mg by mouth Every 4 (Four) Hours As Needed for Nausea or Vomiting (CAN TAKE EVERY 4-6 HRS PRN).     • tiZANidine (ZANAFLEX) 4 MG tablet Take 8 mg by mouth At Night As Needed for Muscle Spasms (CAN TAKE 2 TABS AT BEDTIME, EACH TAB 4MG).     • traMADol (ULTRAM) 50 MG tablet        No current facility-administered medications for this visit.       Allergies   Allergen Reactions   • Erythromycin Nausea And Vomiting and GI Intolerance     \"INTENSE STOMACH PAIN\"   • Lortab [Hydrocodone-Acetaminophen] Other (See Comments)     SKIN BREAKDOWN     Social History     Social History   • Marital status:      Spouse name: N/A   • Number of children: N/A   • Years of education: N/A     Social History Main Topics   • Smoking status: Never Smoker   • Smokeless tobacco: Never Used   • Alcohol use No   • Drug use: No   • Sexual activity: Defer     Other Topics Concern   • None     Social History Narrative     History reviewed. No pertinent family history.  Past Surgical History:   Procedure Laterality Date   • BREAST BIOPSY Right 2016   •  SECTION     • CYST REMOVAL      Breast   • CYST REMOVAL      Cervical cyst   • HERNIA REPAIR     • LUMBAR LAMINECTOMY WITH FUSION N/A 2017    Procedure: L5-S1 OSTEOTOMY; L2-L3,L3-L4 AND L5-S1 INTERBODY FUSIONS; L2 TO S1 POSTERIOR FUSION " "WITH PEDICLE SCREWS AND BONE GRAFT;  Surgeon: Connor Lopez MD;  Location: [facility];  Service:    • OOPHORECTOMY  1993   • ROTATOR CUFF REPAIR      3x   • SEPTOPLASTY     • SPINAL FUSION  2010   • STEROID INJECTION      LUMBAR     Review of Systems   Gastrointestinal: Positive for abdominal pain.   Musculoskeletal: Positive for back pain and myalgias.   Psychiatric/Behavioral: Positive for sleep disturbance.   All other systems reviewed and are negative.    Vitals:    06/21/17 0904   BP: 122/84   BP Location: Right arm   Patient Position: Sitting   Pulse: 88   Temp: 97.7 °F (36.5 °C)   TempSrc: Temporal Artery    SpO2: 98%   Weight: 172 lb (78 kg)   Height: 66.5\" (168.9 cm)     Physical Exam  Neurologic Exam  Lumbar incision healed;  Still quite tended in lumbar region.  Lumbar ROM normal.  Gait normal except for stiff back.      Huma was seen today for post-op.    Diagnoses and all orders for this visit:    Scoliosis of lumbosacral spine, unspecified scoliosis type    Infantile idiopathic scoliosis of lumbosacral region    Sciatica of right side    Cervicalgia    Status post lumbar spinal fusion    Chronic bilateral low back pain without sciatica    Pain in thoracic spine    Scoliosis (and kyphoscoliosis), idiopathic    Cervical spondylosis without myelopathy    Chronic bilateral low back pain with right-sided sciatica    Thoracic spondylosis without myelopathy    Lumbosacral spondylosis without myelopathy      Plan: continue daily exercise;  Apply Ice frequently;  Use tramadol for pain prn;  Continue 20 pound lifting restriction;  F/U 3 months for re-evaluation.    I, Dr. Connor Lopez, personally performed the services described in the documentation as scribed in my presence, and the documentation is both accurate and complete.    Connor Lopez MD   Scribed for Connor Lopez MD by Guera Suresh CMA @. 6/21/2017  9:37 AM  "

## 2017-06-26 RX ORDER — MELOXICAM 15 MG/1
TABLET ORAL
Qty: 30 TABLET | Refills: 2 | Status: SHIPPED | OUTPATIENT
Start: 2017-06-26 | End: 2017-09-15 | Stop reason: SDUPTHER

## 2017-06-26 NOTE — TELEPHONE ENCOUNTER
Provider:  John  Pharmacy: [facility]  Surgery:  L5-S1 OSTEOTOMY; L2-L3,L3-L4 AND L5-S1 INTERBODY FUSIONS; L2 TO S1 POSTERIOR FUSION WITH PEDICLE SCREWS AND BONE GRAFT  Surgery Date:  04/04/17  Last visit:   06/21/17  Next visit: none scheduled    Reason for call:       Automated refill request from patient's pharmacy

## 2017-07-05 ENCOUNTER — TELEPHONE (OUTPATIENT)
Dept: NEUROSURGERY | Facility: CLINIC | Age: 57
End: 2017-07-05

## 2017-07-05 NOTE — TELEPHONE ENCOUNTER
PHARMACY CALLING REGARDING A SCRIPT FOR GABAPENTIN THEY RECEIVED FROM [facility].  THEY NEED SOMEONE TO CALL WITH PEREZ #.  SINCE MED HAS CHANGED AS OF 7/1/17.      [facility] - [phone].

## 2017-07-05 NOTE — TELEPHONE ENCOUNTER
Medication had to be changed to Dr. Banerjee.  (It was written by Precious).  Pharmacy notified of Dr. Case PEREZ #.

## 2017-07-17 DIAGNOSIS — Z98.1 S/P LUMBAR SPINAL FUSION: Primary | ICD-10-CM

## 2017-07-17 RX ORDER — TRAMADOL HYDROCHLORIDE 300 MG/1
300 TABLET, EXTENDED RELEASE ORAL DAILY
Qty: 30 TABLET | Refills: 0 | OUTPATIENT
Start: 2017-07-17 | End: 2017-07-24

## 2017-07-17 NOTE — TELEPHONE ENCOUNTER
Provider:  John  Caller: self   Surgery:  L5-S1 OSTEOTOMY; L2-L3,L3-L4 AND L5-S1 INTERBODY FUSIONS; L2 TO S1 POSTERIOR FUSION WITH PEDICLE SCREWS AND BONE GRAFT  Surgery Date:  4/4/17  Last visit:   6/21/17  Next visit: tavon ENGLANDPER:         03/05/2017 Tramadol 50mg 1960 180 30 [facility] [id number] [facility] 30 1    04/03/2017 Tramadol 50mg 1960 180 30 [facility] [id number] [facility] 30 1      06/20/2017 Tramadol 50mg 1960 180 30 [facility] [id number] [facility] 30 1      Reason for call:         ----- Message from Huma Montano sent at 7/17/2017  1:06 PM EDT -----  Regarding: Non-Urgent Medical Question  Contact: [phone]  Dr. John GONZALEZ/Renetta PALUMBO,         I'm having issues with nausea and stomach pain, especially in the morning.  I have cut down on the amount of medication I'm using by no longer taking Tizanidine.  I still have occasional muscle spasms but using stretching/exercise for management.  The dysesthetic type pain has improved so I decreased the Gabapentin from 5 capsules a day to 2 capsules at bedtime.  I have been increasing my activity and still require 100mg of Tramadol 3 times daily for lumbar and right SI pain as well as ongoing thoracic pain due to stenosis.  I have previously used Tramadol ER 300mg  for chronic pain with better mobility in the morning and management of pain throughout the day.  I'm inquiring if my prescription could be changed to the extended release for these reasons and the potential to ease my stomach issues.       I also have a question regarding my lifting restrictions but do not return until mid September for my follow up.  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  Could you please advise when I will be able to meet these requirements so I may update my employer?  Upon returning to work, given the nature  of my job and standing the majority of the day, would it be possible to start with a part time work load and gradually increase?       Thank you for addressing my questions.  If an office visit is needed, please advise and I will call and make an appointment.    Thank you,  Huma Montano

## 2017-07-24 ENCOUNTER — TELEPHONE (OUTPATIENT)
Dept: NEUROSURGERY | Facility: CLINIC | Age: 57
End: 2017-07-24

## 2017-07-24 DIAGNOSIS — M54.41 CHRONIC BILATERAL LOW BACK PAIN WITH RIGHT-SIDED SCIATICA: Primary | ICD-10-CM

## 2017-07-24 DIAGNOSIS — G89.29 CHRONIC BILATERAL LOW BACK PAIN WITH RIGHT-SIDED SCIATICA: Primary | ICD-10-CM

## 2017-07-24 NOTE — TELEPHONE ENCOUNTER
Provider:  John  Caller: Huma Montano  Time of call:   2:47  Phone #:  [phone]  Surgery:  L5-S1 OSTEOTOMY; L2-L3,L3-L4 AND L5-S1 INTERBODY FUSIONS; L2 TO S1 POSTERIOR FUSION WITH PEDICLE SCREWS AND BONE GRAFT  Surgery Date:  04/04/17  Last visit:   06/21/17  Next visit: to f/u in September    NIKOLE:       05/22/2017 Oxycodone/Acetaminophen  325MG/10MG 1960 90 22 Connor Lopez   06/20/2017 Tramadol 50mg 1960 #180 30 days [facility]  07/19/2017 Tramadol Hcl Er 300MG 1960 #30 30 days Connor Lopez     Reason for call:         ----- Message from Huma Montano sent at 7/24/2017  2:47 PM EDT -----  Regarding: Prescription Question  Contact: [phone]  Dr John MD/Renetta Milan PA-C,         My Tramadol refill was changed to the extended release last week, upon to my request.  While I have had very positive results in the past, it is currently not managing my pain and I have been miserable since switching.  My symptoms of nausea and stomach pain have improved a bit but my pain level has increased from a 3-4 to 5-7.  I'm not sure why I am not responding as my prior use of the ER but there is a significant difference.  I would like to request to return to the 100 mg 3 times daily because I have not been able to sleep or perform daily activities as well with the increased pain level. I will take the stomach discomfort over the back and return of R leg pain.      If I need to surrender the Tramadol ER in order to prescribe my refill with the 100 mg 3 times daily, I will gladly bring it to your office.          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  I understand Dr Lopez is out of the office more this month so, again I thank you for addressing my  questions.    Sincerely,    Huma Montano

## 2017-07-24 NOTE — PROGRESS NOTES
Spoke with patient regarding her back and leg pain and she was doing better with the tramadol 100 mg 3 times a day.  We will call that into her pharmacy.  I have left her restrictions at 20 pounds at the present time.  I've also asked her to increase her Neurontin since she is back down on it and she is taking only 600 mg at bedtime.  Hopefully with these modifications she will show some improvement and in the next week to 10 days we can potentially increase her weights from 20-25 pounds.  All the patient's questions were answered she is in agreement with this plan.

## 2017-07-24 NOTE — TELEPHONE ENCOUNTER
Tramadol 50mg - 2 pills TID #60 3 RF called into [facility] in [place],   What about this weight restriction the pt mentioned in her [employer] message?

## 2017-07-25 ENCOUNTER — DOCUMENTATION (OUTPATIENT)
Dept: NEUROSURGERY | Facility: CLINIC | Age: 57
End: 2017-07-25

## 2017-07-25 NOTE — PROGRESS NOTES
Huma Montano  1960    Weight limitation should be at approximately 20 pounds at the present time.  Over the next 1-2 weeks as symptoms improve the weight limit may be increased to 25 pounds.      Kathrin Domínguez PA-C

## 2017-07-25 NOTE — TELEPHONE ENCOUNTER
Patient called back at 4:57.  She was very appreciative of the work note and she will be by tomorrow to pick it up.  I have left it at the  with the prescriptions.

## 2017-07-27 ENCOUNTER — TELEPHONE (OUTPATIENT)
Dept: NEUROSURGERY | Facility: CLINIC | Age: 57
End: 2017-07-27

## 2017-07-27 NOTE — TELEPHONE ENCOUNTER
Her math is right, we just don't usually prescribe that many pills at once, which is why we have already approved the refills.

## 2017-08-09 ENCOUNTER — TRANSCRIBE ORDERS (OUTPATIENT)
Dept: ADMINISTRATIVE | Facility: HOSPITAL | Age: 57
End: 2017-08-09

## 2017-08-09 DIAGNOSIS — R92.8 ABNORMAL MAMMOGRAM: Primary | ICD-10-CM

## 2017-08-11 ENCOUNTER — HOSPITAL ENCOUNTER (OUTPATIENT)
Dept: MAMMOGRAPHY | Facility: HOSPITAL | Age: 57
Discharge: HOME | End: 2017-08-11
Attending: FAMILY MEDICINE | Admitting: FAMILY MEDICINE

## 2017-08-11 DIAGNOSIS — R92.8 ABNORMAL MAMMOGRAM: ICD-10-CM

## 2017-08-11 PROCEDURE — 77066 DX MAMMO INCL CAD BI: CPT | Performed by: RADIOLOGY

## 2017-08-11 PROCEDURE — G0204 DX MAMMO INCL CAD BI: HCPCS

## 2017-08-11 PROCEDURE — 77062 BREAST TOMOSYNTHESIS BI: CPT | Performed by: RADIOLOGY

## 2017-08-11 PROCEDURE — G0279 TOMOSYNTHESIS, MAMMO: HCPCS

## 2017-08-12 ENCOUNTER — OFFICE VISIT (OUTPATIENT)
Dept: NEUROSURGERY | Facility: CLINIC | Age: 57
End: 2017-08-12

## 2017-08-12 VITALS — HEIGHT: 67 IN | WEIGHT: 172 LBS | HEART RATE: 99 BPM | TEMPERATURE: 95.7 F | BODY MASS INDEX: 27 KG/M2

## 2017-08-12 DIAGNOSIS — M47.812 CERVICAL SPONDYLOSIS WITHOUT MYELOPATHY: ICD-10-CM

## 2017-08-12 DIAGNOSIS — M54.6 PAIN IN THORACIC SPINE: ICD-10-CM

## 2017-08-12 DIAGNOSIS — M54.2 CERVICALGIA: ICD-10-CM

## 2017-08-12 DIAGNOSIS — M41.07 INFANTILE IDIOPATHIC SCOLIOSIS OF LUMBOSACRAL REGION: Primary | ICD-10-CM

## 2017-08-12 DIAGNOSIS — M54.41 CHRONIC BILATERAL LOW BACK PAIN WITH RIGHT-SIDED SCIATICA: ICD-10-CM

## 2017-08-12 DIAGNOSIS — Z98.1 STATUS POST LUMBAR SPINAL FUSION: ICD-10-CM

## 2017-08-12 DIAGNOSIS — M47.814 THORACIC SPONDYLOSIS WITHOUT MYELOPATHY: ICD-10-CM

## 2017-08-12 DIAGNOSIS — M47.817 LUMBOSACRAL SPONDYLOSIS WITHOUT MYELOPATHY: ICD-10-CM

## 2017-08-12 DIAGNOSIS — G89.29 CHRONIC BILATERAL LOW BACK PAIN WITH RIGHT-SIDED SCIATICA: ICD-10-CM

## 2017-08-12 PROCEDURE — 99213 OFFICE O/P EST LOW 20 MIN: CPT | Performed by: NEUROLOGICAL SURGERY

## 2017-08-12 RX ORDER — TRAMADOL HYDROCHLORIDE 50 MG/1
100 TABLET ORAL 3 TIMES DAILY PRN
Qty: 180 TABLET | Refills: 2 | OUTPATIENT
Start: 2017-08-12 | End: 2017-09-13

## 2017-08-12 RX ORDER — GABAPENTIN 600 MG/1
600 TABLET ORAL 3 TIMES DAILY
Qty: 90 TABLET | Refills: 5 | Status: SHIPPED | OUTPATIENT
Start: 2017-08-12 | End: 2018-03-05 | Stop reason: SDUPTHER

## 2017-08-12 NOTE — PROGRESS NOTES
Patient: Huma Montano  :  1960  Chart #:  [id number]    Date of Service: 17    Chief Complaint:   Chief Complaint   Patient presents with   • Back Pain   • Increased R Leg pain       Back Pain     Patient returns today for a follow-up on her Left L2-L3 transformainal lumbar interbody fusion with capstone cage, and a right L3-L4 transforaminal lumbar interbody fusion and a L2-S1 segmental posterior screw fixation on 17. Episode onset: Patient has some stiffness in her back.  She states she still has some problems walking.   The problem occurs rarely. The problem has been gradually improving since onset. The pain is present in the lumbar spine. The pain is at a severity of 2/10. The pain is mild. Risk factors include sedentary lifestyle and lack of exercise. She has tried ice, analgesics and bed rest. The treatment provided moderate relief. She had been using a foam roller for myofacial symptomatic relief.       She is doing better;  She has lumbar region back pain but has had resolution of leg pain.  She is using meloxicam, gabapentin and tramadol;  She has R sciatic notch pain and tenderness.  She is exercising daily.  She is not back at work yet.      Radiographic Images:    Lumbar spin x-rays from 17 were reviewed with the patient and show minimal scoliosis concave to L with apex at L1L2.  Sagittal alignment looks good.      Past Medical History:   Diagnosis Date   • Arthritis    • Back pain    • Graves disease    • Kyphosis of cervical region    • Scoliosis    • Spinal stenosis    • Wears glasses      Current Outpatient Prescriptions   Medication Sig Dispense Refill   • amphetamine-dextroamphetamine XR (ADDERALL XR) 25 MG 24 hr capsule Take 25 mg by mouth every morning.     • aspirin 81 MG EC tablet Take 1 tablet by mouth daily. 30 tablet 0   • DULoxetine (CYMBALTA) 60 MG capsule Take 60 mg by mouth Daily.     • estradiol (MINIVELLE, VIVELLE-DOT) 0.05 MG/24HR patch Place 1 patch on  "the skin 2 (Two) Times a Week. ON WED AND  Currently on left hip     • gabapentin (NEURONTIN) 300 MG capsule 1 capsule in AM, 2 capsules midday, 2 capsules at hs 90 capsule 2   • levothyroxine (SYNTHROID, LEVOTHROID) 112 MCG tablet Take 112 mcg by mouth daily.     • meloxicam (MOBIC) 15 MG tablet Take 15 mg by mouth Daily. WITH FOOD     • meloxicam (MOBIC) 15 MG tablet TAKE ONE TABLET BY MOUTH DAILY WITH FOOD 30 tablet 2   • Multiple Vitamins-Minerals (MULTIVITAL PO) Take 1 tablet by mouth Daily.     • ondansetron (ZOFRAN) 4 MG tablet Take 4 mg by mouth Every 4 (Four) Hours As Needed for Nausea or Vomiting (CAN TAKE EVERY 4-6 HRS PRN).     • tiZANidine (ZANAFLEX) 4 MG tablet Take 8 mg by mouth At Night As Needed for Muscle Spasms (CAN TAKE 2 TABS AT BEDTIME, EACH TAB 4MG).     • gabapentin (NEURONTIN) 600 MG tablet Take 1 tablet by mouth 3 (Three) Times a Day. 90 tablet 5   • traMADol (ULTRAM) 50 MG tablet Take 2 tablets by mouth 3 (Three) Times a Day As Needed for Moderate Pain (4-6). 180 tablet 2     No current facility-administered medications for this visit.       Allergies   Allergen Reactions   • Erythromycin Nausea And Vomiting and GI Intolerance     \"INTENSE STOMACH PAIN\"   • Lortab [Hydrocodone-Acetaminophen] Other (See Comments)     SKIN BREAKDOWN     Social History     Social History   • Marital status:      Spouse name: N/A   • Number of children: N/A   • Years of education: N/A     Social History Main Topics   • Smoking status: Never Smoker   • Smokeless tobacco: Never Used   • Alcohol use No   • Drug use: No   • Sexual activity: Defer     Other Topics Concern   • None     Social History Narrative     Family History   Problem Relation Age of Onset   • Breast cancer Neg Hx    • Ovarian cancer Neg Hx      Past Surgical History:   Procedure Laterality Date   • BREAST BIOPSY Right 2016   •  SECTION     • CYST REMOVAL      Breast   • CYST REMOVAL      Cervical cyst "   • HERNIA REPAIR     • LUMBAR LAMINECTOMY WITH FUSION N/A 4/4/2017    Procedure: L5-S1 OSTEOTOMY; L2-L3,L3-L4 AND L5-S1 INTERBODY FUSIONS; L2 TO S1 POSTERIOR FUSION WITH PEDICLE SCREWS AND BONE GRAFT;  Surgeon: Connor Lopez MD;  Location: [facility];  Service:    • OOPHORECTOMY  1993   • ROTATOR CUFF REPAIR      3x   • SEPTOPLASTY     • SPINAL FUSION  2010   • STEROID INJECTION      LUMBAR     Review of Systems   Constitutional: Negative for activity change, appetite change, chills, diaphoresis, fatigue and unexpected weight change.   HENT: Negative for congestion, dental problem, drooling, ear discharge, ear pain, facial swelling, hearing loss, mouth sores, nosebleeds, postnasal drip, rhinorrhea, sinus pressure, sneezing, sore throat, tinnitus, trouble swallowing and voice change.    Eyes: Negative for photophobia, pain, discharge, redness, itching and visual disturbance.   Respiratory: Negative for apnea, cough, choking, chest tightness, shortness of breath, wheezing and stridor.    Cardiovascular: Negative for palpitations and leg swelling.   Gastrointestinal: Negative for abdominal distention, anal bleeding, blood in stool, constipation, diarrhea, nausea, rectal pain and vomiting.   Endocrine: Negative for cold intolerance, heat intolerance, polydipsia, polyphagia and polyuria.   Genitourinary: Negative for decreased urine volume, difficulty urinating, enuresis, flank pain, frequency, genital sores, hematuria and urgency.   Musculoskeletal: Positive for back pain and myalgias (Increased right leg pain). Negative for arthralgias, gait problem, joint swelling, neck pain and neck stiffness.   Skin: Negative for color change, pallor, rash and wound.   Allergic/Immunologic: Negative for environmental allergies, food allergies and immunocompromised state.   Neurological: Negative for dizziness, tremors, seizures, syncope, facial asymmetry, speech difficulty and light-headedness.   Hematological: Negative for  "adenopathy. Does not bruise/bleed easily.   Psychiatric/Behavioral: Negative for agitation, behavioral problems, confusion, decreased concentration, dysphoric mood, hallucinations, self-injury, sleep disturbance and suicidal ideas. The patient is not nervous/anxious and is not hyperactive.    All other systems reviewed and are negative.    Vitals:    08/12/17 0910   Pulse: 99   Temp: 95.7 °F (35.4 °C)   TempSrc: Temporal Artery    Weight: 172 lb (78 kg)   Height: 66.5\" (168.9 cm)     Physical Exam  Neurologic Exam  Constitutional: She is oriented to person, place, and time. Vital signs are normal. She appears well-developed and well-nourished. No distress.   Neat healthy female   Neck: Trachea normal and normal range of motion. No thyroid mass present.   Minimal neck stiffness; No Spurling's or L'Hermitte's signs; Shoulder ROM normal   Musculoskeletal:   Thoracic back: She exhibits pain. She exhibits no tenderness and no deformity.   Lumbar back: She exhibits pain. She exhibits normal range of motion, no deformity and no spasm.   Healed lumbar incision; Mild stiffness; SLR negative; Hip ROM normal    Tender to palpation of R buttock and ischial tuberosity    Mental Status   Oriented to person, place, and time.   Attention: normal. Concentration: normal.   Speech: speech is normal   Level of consciousness: alert  Knowledge: good and consistent with education.   Normal comprehension.       Cranial Nerves   Cranial nerves II through XII intact.       Motor Exam   Muscle bulk: normal  Overall muscle tone: normal      Strength   Strength 5/5 throughout.       Sensory Exam   Light touch normal.   Proprioception normal.       Gait, Coordination, and Reflexes       Gait  Gait: normal      Tremor   Resting tremor: absent  Intention tremor: absent  Action tremor: absent      Reflexes   Right biceps: 1+  Left biceps: 1+  Right triceps: 1+  Left triceps: 1+  Right patellar: 2+  Left patellar: 2+  Right achilles: 1+  Left " achilles: 1+  Right Scherer: absent  Left Scherer: absent  Right ankle clonus: absent  Left ankle clonus: absent  LATASHA normal; Right handed       Huma was seen today for back pain and increased r leg pain.    Diagnoses and all orders for this visit:    Infantile idiopathic scoliosis of lumbosacral region    Cervicalgia    Status post lumbar spinal fusion    Pain in thoracic spine    Cervical spondylosis without myelopathy    Chronic bilateral low back pain with right-sided sciatica  -     gabapentin (NEURONTIN) 600 MG tablet; Take 1 tablet by mouth 3 (Three) Times a Day.  -     traMADol (ULTRAM) 50 MG tablet; Take 2 tablets by mouth 3 (Three) Times a Day As Needed for Moderate Pain (4-6).    Thoracic spondylosis without myelopathy    Lumbosacral spondylosis without myelopathy    Ischial bursitis    Plan: Renew gabapentin and tramadol;  Return for re-evaluation in 1 month with return to work after that visit;  If the sciatic notch pain is not better in 2 weeks I will refer to pain specialist for ischial bursa injection.      I, Dr. Connor Lopez, personally performed the services described in the documentation as scribed in my presence, and the documentation is both accurate and complete.    Jake Modi, CMA

## 2017-08-18 ENCOUNTER — TELEPHONE (OUTPATIENT)
Dept: NEUROSURGERY | Facility: CLINIC | Age: 57
End: 2017-08-18

## 2017-08-18 DIAGNOSIS — G89.29 CHRONIC MIDLINE LOW BACK PAIN WITHOUT SCIATICA: Primary | ICD-10-CM

## 2017-08-18 DIAGNOSIS — M54.50 CHRONIC MIDLINE LOW BACK PAIN WITHOUT SCIATICA: Primary | ICD-10-CM

## 2017-08-18 NOTE — TELEPHONE ENCOUNTER
Provider:  John  Caller:  magali  Time of call:   11:17am  Phone #:  [phone]  Surgery:  L5-S1 OSTEOTOMY; L2-L3,L3-L4 AND L5-S1 INTERBODY FUSIONS; L2 TO S1 POSTERIOR FUSION  Surgery Date:  04/04/17  Last visit:   08/12/17  Next visit:    NA    Reason for call:         ---- Message from Huma Montano sent at 8/18/2017 11:17 AM EDT -----  Regarding: Visit Follow-Up Question  Contact: [phone]  Hi Dr. Lopez,    During my last visit we discussed my increased right low back, hip and ischial  pain and if it has not improved in 2 weeks to contact you to have pain addressed by a pain management MD.  My pain continues to worsen with a constant level of 5-7/10, affecting my tolerance to sitting and standing.  You felt is was most likely ischial bursitis and I would like to go ahead and set up an appointment for an injection as soon as available so I can decrease my pain and return to work.     I guess I can either return to Dr. Guillaume Morales or if you have a pain management MD that you prefer working with, I am willing to change.  I may possibly need a referral for this.    Thank you for your assistance with this issue.    Huma Montano

## 2017-09-13 ENCOUNTER — OFFICE VISIT (OUTPATIENT)
Dept: NEUROSURGERY | Facility: CLINIC | Age: 57
End: 2017-09-13

## 2017-09-13 VITALS
HEIGHT: 67 IN | SYSTOLIC BLOOD PRESSURE: 130 MMHG | DIASTOLIC BLOOD PRESSURE: 90 MMHG | BODY MASS INDEX: 26.68 KG/M2 | WEIGHT: 170 LBS | TEMPERATURE: 98.2 F

## 2017-09-13 DIAGNOSIS — M54.41 CHRONIC BILATERAL LOW BACK PAIN WITH RIGHT-SIDED SCIATICA: ICD-10-CM

## 2017-09-13 DIAGNOSIS — G89.29 CHRONIC BILATERAL LOW BACK PAIN WITH RIGHT-SIDED SCIATICA: ICD-10-CM

## 2017-09-13 DIAGNOSIS — M41.25 OTHER IDIOPATHIC SCOLIOSIS, THORACOLUMBAR REGION: Primary | ICD-10-CM

## 2017-09-13 PROCEDURE — 99213 OFFICE O/P EST LOW 20 MIN: CPT | Performed by: PHYSICIAN ASSISTANT

## 2017-09-13 RX ORDER — TRAMADOL HYDROCHLORIDE 300 MG/1
300 TABLET, EXTENDED RELEASE ORAL DAILY
Qty: 60 TABLET | Refills: 0
Start: 2017-09-13

## 2017-09-13 RX ORDER — TRAMADOL HYDROCHLORIDE 300 MG/1
300 TABLET, EXTENDED RELEASE ORAL DAILY
Qty: 60 TABLET | Refills: 0 | Status: CANCELLED | OUTPATIENT
Start: 2017-09-13

## 2017-09-13 RX ORDER — IBUPROFEN 600 MG/1
600 TABLET ORAL EVERY 6 HOURS PRN
Status: DISCONTINUED | OUTPATIENT
Start: 2017-09-13 | End: 2017-09-15

## 2017-09-13 NOTE — PROGRESS NOTES
PT NAME: Huma Montano   : 1960   Date of Service: 2017       CC: Low back pain and soreness.  Right hip/a shield soreness.       HPI: Patient returns today for follow-up of her left L2-L3 transforaminal lumbar interbody fusion with capstone cage,  right L3-L4 transforaminal lumbar interbody fusion with capstone cage, and L2 to S1 segmental posterior fixation on 2017.  She was last seen by Dr. Lopez on 2017 at which time she was still having some problems walking.  She complained of low back pain; severity 2 out of 10.  She complained of some right sciatic notch pain.  Dr. Lopez reviewed lumbar spine x-rays from 2017 that showed minimal scoliosis concave to the left with the apex at L1-L2.  She had good sagittal alignment.  Dr. Lopez recommended continued gabapentin and tramadol.  He also felt that if her sciatic notch pain was not better in a couple of weeks that she should be referred to pain management for bursa injection.    Today, Ms. Montano states that she has returned back to work.  She feels that she is having more bad days than good since returning back to work.  She has not been able to get into a pain management physician yet.  She is due to be seen in about a month.  She continues to take gabapentin and Ultram  mg once a day for symptom control.  She has not had any difficulty with her surgical incision.  She denies any new neurologic complaints.    The following portions of the patient's history were reviewed and updated as appropriate: allergies, current medications, past family history, past medical history, past social history, past surgical history and problem list.    Review of Systems   Constitutional: Positive for activity change. Negative for appetite change, chills, diaphoresis, fatigue, fever and unexpected weight change.   HENT: Negative for congestion, dental problem, drooling, ear discharge, ear pain, facial swelling, hearing loss, mouth sores,  nosebleeds, postnasal drip, rhinorrhea, sinus pressure, sneezing, sore throat, tinnitus, trouble swallowing and voice change.    Eyes: Negative for photophobia, pain, discharge, redness, itching and visual disturbance.   Respiratory: Negative for apnea, cough, choking, chest tightness, shortness of breath, wheezing and stridor.    Cardiovascular: Negative for chest pain, palpitations and leg swelling.   Gastrointestinal: Negative for abdominal distention, abdominal pain, anal bleeding, blood in stool, constipation, diarrhea, nausea, rectal pain and vomiting.   Endocrine: Negative for cold intolerance, heat intolerance, polydipsia, polyphagia and polyuria.   Genitourinary: Negative for decreased urine volume, difficulty urinating, dysuria, enuresis, flank pain, frequency, genital sores, hematuria and urgency.   Musculoskeletal: Positive for arthralgias, back pain and myalgias. Negative for gait problem, joint swelling, neck pain and neck stiffness.   Skin: Negative for color change, pallor, rash and wound.   Allergic/Immunologic: Negative for environmental allergies, food allergies and immunocompromised state.   Neurological: Negative for dizziness, tremors, seizures, syncope, facial asymmetry, speech difficulty, weakness, light-headedness, numbness and headaches.   Hematological: Negative for adenopathy. Does not bruise/bleed easily.   Psychiatric/Behavioral: Negative for agitation, behavioral problems, confusion, decreased concentration, dysphoric mood, hallucinations, self-injury, sleep disturbance and suicidal ideas. The patient is not nervous/anxious and is not hyperactive.        PHYSICAL EXAM:   Vitals:    09/13/17 1328   BP: 130/90   Temp: 98.2 °F (36.8 °C)     General: Well-nourished, well-developed female in no acute distress.  She appears her stated age.  She is normocephalic, atraumatic.  Neck: Supple.  Range of motion within normal limits.  HEENT: Hearing intact bilaterally.  Extraocular movements  intact.  Neuromotor exam: Station and gait are within normal limits.  She is able to rise from a sitting to standing position without difficulty.  Motor strength is grossly intact in the lower extremities.  Examination of her surgical incision reveals it to be intact and nicely healed.  There is no evidence of erythema, edema, or induration.  She demonstrates fairly good lumbosacral spine range of motion.  She still has some tenderness to palpation as well as soreness in the low back and right buttock/ischial region.    IMPRESSION:   Status post lumbar spine fusion  Thoracic spine pain.  Cervical spondylosis without myelopathy.  Chronic bilateral low back pain with right-sided sciatica.  Thoracic spondylosis without myelopathy.  Initial bursitis.      PLAN:  Conservative treatment/measures were discussed with Ms. Montano today.  Her Ultram  mg 1 by mouth daily #60 was renewed today.  She was also given a prescription for ibuprofen 600 mg 1 by mouth every 6 hours when necessary.  She will follow-up with Dr. Lopez in 2 months for further recommendations.        Renetta Milan PA-C

## 2017-09-15 ENCOUNTER — DOCUMENTATION (OUTPATIENT)
Dept: NEUROSURGERY | Facility: CLINIC | Age: 57
End: 2017-09-15

## 2017-09-15 DIAGNOSIS — M41.25 OTHER IDIOPATHIC SCOLIOSIS, THORACOLUMBAR REGION: ICD-10-CM

## 2017-09-15 RX ORDER — IBUPROFEN 600 MG/1
600 TABLET ORAL EVERY 6 HOURS PRN
Qty: 45 TABLET | Refills: 1 | OUTPATIENT
Start: 2017-09-15 | End: 2017-12-06

## 2017-09-26 ENCOUNTER — TELEPHONE (OUTPATIENT)
Dept: NEUROSURGERY | Facility: CLINIC | Age: 57
End: 2017-09-26

## 2017-09-26 DIAGNOSIS — M54.50 ACUTE MIDLINE LOW BACK PAIN WITHOUT SCIATICA: ICD-10-CM
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
## 2017-09-26 NOTE — TELEPHONE ENCOUNTER
Provider:  John Milan  Caller: pt - myChart msg  Time of call:     Phone #:    Surgery:  L5-S1 OSTEOTOMY; L2-L3,L3-L4 AND L5-S1 INTERBODY FUSIONS; L2 TO S1 POSTERIOR FUSION  Surgery Date:  04/04/17  Last visit:   09/13/17  Next visit:    11/08/17    Reason for call:         --- Message from Huma Montano sent at 9/26/2017  3:38 AM EDT -----  Regarding: Non-Urgent Medical Question  Contact: [phone]  Dr. Lopez/Renetta,    I had a fall last tuesday, 09-19-17, while walking down the ramp of a patient's home.  It was raining and I found out the hard way the wood surface didn't have nonslip material towards the bottom of the ramp and both of my feet flew out from under me and I landed hard on my back.  Since then I have gone from general soreness the following day to a pain level of 7 by the afternoon, making it a struggle to complete my work day and activities after I get home almost unbearable.   I currently still have pain in the following areas:  -Headache. I had an increase of floaters in my vision, which have improved in the past 2 days but headache remains constant. That concerned me since I had a CSF leak with my L1-S1 fusion with the same symptoms.  -L cervical pain  -Increased thoracic pain with R side muscle spasm down to lumbar area.  -Lumbar/sacral pain at base of my incision that is very tender to palpation  -Constant Left proximal bicep pain.  Avoiding sleeping on left side due to painful numbness from shoulder to fingers. Hx of 2 rotation cuff repairs in 2012 with a biceps tenodesis with 2nd one.     I'm still shelter in denial this fall happened after just returning to work on Sept 5th, but the pain has made it very real. I just want to be sure I didn't do any harm since my surgery was 5 months ago.       My R ischial pain has been exacerbated since returning to work, but I have an appt for a steroid injection on 10-08-17 with Dr. Guillaume Morales.

## 2017-09-26 NOTE — TELEPHONE ENCOUNTER
Spoke with patient.  No B/B changes.  No loss of perineal sensation.  No focal weakness.  Pt did not hit head when she fell.  Ambulating. NSAIDs and tramadol not helping.  No radiating leg pain.  Numbness in upper extremity only at night.  Has history of bicep tendon injury and rotator cuff issues.  No radiating pain from neck into upper extremity.  Headache is in occipital region; mainly muscle tightness and spasm.  She will contact her Orthopedist in regard to upper extremity symptoms.  Will obtain thoracolumbar xrays and contact patient if any abnormality.  Order is in Epic.

## 2017-10-02 RX ORDER — MELOXICAM 15 MG/1
TABLET ORAL
Qty: 30 TABLET | Refills: 1 | Status: SHIPPED | OUTPATIENT
Start: 2017-10-02 | End: 2017-12-05 | Stop reason: SDUPTHER

## 2017-10-02 NOTE — TELEPHONE ENCOUNTER
Provider: Sang   Caller: Rx   Time of call:     Phone #:    Surgery:n/a    Surgery Date:    Last visit:6/21/17     Next visit:     NIKOLE:           Reason for call:       Refill request

## 2017-11-08 ENCOUNTER — OFFICE VISIT (OUTPATIENT)
Dept: NEUROSURGERY | Facility: CLINIC | Age: 57
End: 2017-11-08

## 2017-11-08 VITALS
WEIGHT: 170 LBS | DIASTOLIC BLOOD PRESSURE: 84 MMHG | SYSTOLIC BLOOD PRESSURE: 138 MMHG | BODY MASS INDEX: 26.68 KG/M2 | OXYGEN SATURATION: 99 % | TEMPERATURE: 97.8 F | HEART RATE: 95 BPM | HEIGHT: 67 IN

## 2017-11-08 DIAGNOSIS — G89.29 CHRONIC MIDLINE LOW BACK PAIN WITHOUT SCIATICA: ICD-10-CM

## 2017-11-08 DIAGNOSIS — M54.2 CERVICALGIA: ICD-10-CM

## 2017-11-08 DIAGNOSIS — G89.29 CHRONIC BILATERAL LOW BACK PAIN WITH RIGHT-SIDED SCIATICA: ICD-10-CM

## 2017-11-08 DIAGNOSIS — M41.25 OTHER IDIOPATHIC SCOLIOSIS, THORACOLUMBAR REGION: ICD-10-CM

## 2017-11-08 DIAGNOSIS — M54.50 CHRONIC MIDLINE LOW BACK PAIN WITHOUT SCIATICA: ICD-10-CM

## 2017-11-08 DIAGNOSIS — Z98.1 STATUS POST LUMBAR SPINAL FUSION: ICD-10-CM

## 2017-11-08 DIAGNOSIS — M47.814 THORACIC SPONDYLOSIS WITHOUT MYELOPATHY: ICD-10-CM

## 2017-11-08 DIAGNOSIS — M41.07 INFANTILE IDIOPATHIC SCOLIOSIS OF LUMBOSACRAL REGION: ICD-10-CM

## 2017-11-08 DIAGNOSIS — S39.012A STRAIN OF LUMBAR REGION, INITIAL ENCOUNTER: ICD-10-CM

## 2017-11-08 DIAGNOSIS — M54.41 CHRONIC BILATERAL LOW BACK PAIN WITH RIGHT-SIDED SCIATICA: ICD-10-CM

## 2017-11-08 DIAGNOSIS — M47.812 CERVICAL SPONDYLOSIS WITHOUT MYELOPATHY: ICD-10-CM

## 2017-11-08 DIAGNOSIS — M54.6 PAIN IN THORACIC SPINE: ICD-10-CM

## 2017-11-08 DIAGNOSIS — M54.50 ACUTE MIDLINE LOW BACK PAIN WITHOUT SCIATICA: ICD-10-CM
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
## 2017-11-08 PROCEDURE — 99213 OFFICE O/P EST LOW 20 MIN: CPT | Performed by: NEUROLOGICAL SURGERY

## 2017-11-08 RX ORDER — TRAMADOL HYDROCHLORIDE 50 MG/1
50 TABLET ORAL EVERY 6 HOURS PRN
Qty: 60 TABLET | Refills: 2 | Status: SHIPPED | OUTPATIENT
Start: 2017-11-08 | End: 2018-06-06 | Stop reason: SDUPTHER

## 2017-11-08 NOTE — PROGRESS NOTES
Patient: Huma Montano  :  1960  Chart #:  [id number]    Date of Service: 17    Chief Complaint:   Chief Complaint   Patient presents with   • Back Pain       Back Pain   Chronicity: Mrs. Montano returns todayfor a follow-up on her back pain. Episode onset: On 17 she had a Left L2-L3 transforaminal lumbar interbody fusion with capstone cage and a right L3-L4 transforaminal lumbar interbody fusion with capstone cage and a L2-S1 segmental posterior fixation. Episode frequency: She is about 7 months out now and returned to work back in September. Progression since onset: Patient states that she fell in September when she was leaving a patient's home.  She slipped and fell  on their ramp and hurt her back. The pain is present in the lumbar spine (She was doing well until the fall.). Radiates to: She has been having muscle spasms since the fall.  She had to start taking her muscle relaxer again. The pain is at a severity of 4/10. The pain is mild. The pain is worse during the day. The symptoms are aggravated by position. Stiffness is present in the morning. Pertinent negatives include no abdominal pain, chest pain, dysuria, fever, headaches, numbness or weakness. Risk factors: She has not been able to return to work as of yet because she cannot take muscle relaxers while she's at work.  She had a large contusion on her right buttock. She has tried bed rest, muscle relaxant and analgesics for the symptoms. The treatment provided mild relief.     She is still painful after recent fall.  She has R lumbar paraspinal spasms and a tender trigger point in the R SI joint region.  She is using tramadol, gabapentin and tizanidine.  She is off work due to the fall.  She has had R hip and R SI joint trigger point injections which have helped.  She is seeing Dr. Morales in pain management.    Radiographic Images:   X-ray of the lumbar spine dated 17 shows her sagittal alignment is normal.  There are no  fractures noted.  She has a slight Left scoliotic curve.    Past Medical History:   Diagnosis Date   • Arthritis    • Back pain    • Graves disease    • Kyphosis of cervical region    • Scoliosis    • Spinal stenosis    • Wears glasses      Current Outpatient Prescriptions   Medication Sig Dispense Refill   • amphetamine-dextroamphetamine XR (ADDERALL XR) 25 MG 24 hr capsule Take 25 mg by mouth every morning.     • aspirin 81 MG EC tablet Take 1 tablet by mouth daily. 30 tablet 0   • estradiol (MINIVELLE, VIVELLE-DOT) 0.05 MG/24HR patch Place 1 patch on the skin 2 (Two) Times a Week. ON WED AND SATURDAYS 4-4-2017 Currently on left hip     • gabapentin (NEURONTIN) 300 MG capsule 1 capsule in AM, 2 capsules midday, 2 capsules at hs 90 capsule 2   • gabapentin (NEURONTIN) 600 MG tablet Take 1 tablet by mouth 3 (Three) Times a Day. 90 tablet 5   • ibuprofen (ADVIL,MOTRIN) 600 MG tablet Take 1 tablet by mouth Every 6 (Six) Hours As Needed for Mild Pain . 45 tablet 1   • levothyroxine (SYNTHROID, LEVOTHROID) 112 MCG tablet Take 112 mcg by mouth daily.     • meloxicam (MOBIC) 15 MG tablet Take 15 mg by mouth Daily. WITH FOOD     • meloxicam (MOBIC) 15 MG tablet TAKE ONE TABLET BY MOUTH DAILY WITH FOOD 30 tablet 1   • Multiple Vitamins-Minerals (MULTIVITAL PO) Take 1 tablet by mouth Daily.     • ondansetron (ZOFRAN) 4 MG tablet Take 4 mg by mouth Every 4 (Four) Hours As Needed for Nausea or Vomiting (CAN TAKE EVERY 4-6 HRS PRN).     • tiZANidine (ZANAFLEX) 4 MG tablet Take 8 mg by mouth At Night As Needed for Muscle Spasms (CAN TAKE 2 TABS AT BEDTIME, EACH TAB 4MG).     • traMADol ER (ULTRAM ER) 300 MG 24 hr tablet Take 1 tablet by mouth Daily. 60 tablet 0   • traMADol (ULTRAM) 50 MG tablet Take 1 tablet by mouth Every 6 (Six) Hours As Needed for Moderate Pain . 60 tablet 2     No current facility-administered medications for this visit.       Allergies   Allergen Reactions   • Erythromycin Nausea And Vomiting and GI  "Intolerance     \"INTENSE STOMACH PAIN\"   • Lortab [Hydrocodone-Acetaminophen] Other (See Comments)     SKIN BREAKDOWN     Social History     Social History   • Marital status:      Spouse name: N/A   • Number of children: N/A   • Years of education: N/A     Social History Main Topics   • Smoking status: Never Smoker   • Smokeless tobacco: Never Used   • Alcohol use No   • Drug use: No   • Sexual activity: Defer     Other Topics Concern   • None     Social History Narrative     Family History   Problem Relation Age of Onset   • No Known Problems Mother    • No Known Problems Father    • Breast cancer Neg Hx    • Ovarian cancer Neg Hx      Past Surgical History:   Procedure Laterality Date   • BREAST BIOPSY Right 2016   •  SECTION     • CYST REMOVAL      Breast   • CYST REMOVAL      Cervical cyst   • HERNIA REPAIR     • LUMBAR LAMINECTOMY WITH FUSION N/A 2017    Procedure: L5-S1 OSTEOTOMY; L2-L3,L3-L4 AND L5-S1 INTERBODY FUSIONS; L2 TO S1 POSTERIOR FUSION WITH PEDICLE SCREWS AND BONE GRAFT;  Surgeon: Connor Lopez MD;  Location[facility];  Service:    • OOPHORECTOMY     • ROTATOR CUFF REPAIR      3x   • SEPTOPLASTY     • SPINAL FUSION     • STEROID INJECTION      LUMBAR     Review of Systems   Constitutional: Positive for activity change. Negative for appetite change, chills, diaphoresis, fatigue, fever and unexpected weight change.   HENT: Negative for congestion, dental problem, drooling, ear discharge, ear pain, facial swelling, hearing loss, mouth sores, nosebleeds, postnasal drip, rhinorrhea, sinus pressure, sneezing, sore throat, tinnitus, trouble swallowing and voice change.    Eyes: Negative for photophobia, pain, discharge, redness, itching and visual disturbance.   Respiratory: Negative for apnea, cough, choking, chest tightness, shortness of breath, wheezing and stridor.    Cardiovascular: Negative for chest pain, palpitations and leg swelling.   Gastrointestinal: " "Negative for abdominal distention, abdominal pain, anal bleeding, blood in stool, constipation, diarrhea, nausea, rectal pain and vomiting.   Endocrine: Negative for cold intolerance, heat intolerance, polydipsia, polyphagia and polyuria.   Genitourinary: Negative for decreased urine volume, difficulty urinating, dysuria, enuresis, flank pain, frequency, genital sores, hematuria and urgency.   Musculoskeletal: Positive for arthralgias, back pain and myalgias. Negative for gait problem, joint swelling, neck pain and neck stiffness.   Skin: Negative for color change, pallor, rash and wound.   Allergic/Immunologic: Negative for environmental allergies, food allergies and immunocompromised state.   Neurological: Negative for dizziness, tremors, seizures, syncope, facial asymmetry, speech difficulty, weakness, light-headedness, numbness and headaches.   Hematological: Negative for adenopathy. Does not bruise/bleed easily.   Psychiatric/Behavioral: Negative for agitation, behavioral problems, confusion, decreased concentration, dysphoric mood, hallucinations, self-injury, sleep disturbance and suicidal ideas. The patient is not nervous/anxious and is not hyperactive.    All other systems reviewed and are negative.    Vitals:    11/08/17 0757   BP: 138/84   BP Location: Right arm   Patient Position: Sitting   Pulse: 95   Temp: 97.8 °F (36.6 °C)   TempSrc: Temporal Artery    SpO2: 99%   Weight: 170 lb (77.1 kg)   Height: 66.5\" (168.9 cm)     Physical Exam  Neurologic Exam  Constitutional: She is oriented to person, place, and time. Vital signs are normal. She appears well-developed and well-nourished. No distress.   Neat healthy female   Neck: Trachea normal and normal range of motion. No thyroid mass present.   Minimal neck stiffness; No Spurling's or L'Hermitte's signs; Shoulder ROM normal   Musculoskeletal:   Thoracic back: She exhibits pain. She exhibits no tenderness and no deformity.   Lumbar back: She exhibits pain. " She exhibits normal range of motion, no deformity and no spasm.   Healed lumbar incision; Mild stiffness; SLR negative; Hip ROM normal    Tender to palpation of R buttock and ischial tuberosity  Tender trigger point over R SI joint.     Mental Status   Oriented to person, place, and time.   Attention: normal. Concentration: normal.   Speech: speech is normal   Level of consciousness: alert  Knowledge: good and consistent with education.   Normal comprehension.       Cranial Nerves   Cranial nerves II through XII intact.       Motor Exam   Muscle bulk: normal  Overall muscle tone: normal      Strength   Strength 5/5 throughout.       Sensory Exam   Light touch normal.   Proprioception normal.       Gait, Coordination, and Reflexes       Gait  Gait: normal      Tremor   Resting tremor: absent  Intention tremor: absent  Action tremor: absent      Reflexes   Right biceps: 1+  Left biceps: 1+  Right triceps: 1+  Left triceps: 1+  Right patellar: 2+  Left patellar: 2+  Right achilles: 1+  Left achilles: 1+  Right Scherer: absent  Left Scherer: absent  Right ankle clonus: absent  Left ankle clonus: absent  LATASHA normal; Right handed       Huma was seen today for back pain.    Diagnoses and all orders for this visit:    Fall, initial encounter  -     traMADol (ULTRAM) 50 MG tablet; Take 1 tablet by mouth Every 6 (Six) Hours As Needed for Moderate Pain .    Chronic midline low back pain without sciatica  -     traMADol (ULTRAM) 50 MG tablet; Take 1 tablet by mouth Every 6 (Six) Hours As Needed for Moderate Pain .    Pain in thoracic spine  -     traMADol (ULTRAM) 50 MG tablet; Take 1 tablet by mouth Every 6 (Six) Hours As Needed for Moderate Pain .    Cervical spondylosis without myelopathy  -     traMADol (ULTRAM) 50 MG tablet; Take 1 tablet by mouth Every 6 (Six) Hours As Needed for Moderate Pain .    Infantile idiopathic scoliosis of lumbosacral region  -     traMADol (ULTRAM) 50 MG tablet; Take 1 tablet by mouth Every 6  (Six) Hours As Needed for Moderate Pain .    Acute midline low back pain without sciatica  -     traMADol (ULTRAM) 50 MG tablet; Take 1 tablet by mouth Every 6 (Six) Hours As Needed for Moderate Pain .    Other idiopathic scoliosis, thoracolumbar region  -     traMADol (ULTRAM) 50 MG tablet; Take 1 tablet by mouth Every 6 (Six) Hours As Needed for Moderate Pain .    Cervicalgia  -     traMADol (ULTRAM) 50 MG tablet; Take 1 tablet by mouth Every 6 (Six) Hours As Needed for Moderate Pain .    Thoracic spondylosis without myelopathy  -     traMADol (ULTRAM) 50 MG tablet; Take 1 tablet by mouth Every 6 (Six) Hours As Needed for Moderate Pain .    Status post lumbar spinal fusion  -     traMADol (ULTRAM) 50 MG tablet; Take 1 tablet by mouth Every 6 (Six) Hours As Needed for Moderate Pain .    Chronic bilateral low back pain with right-sided sciatica  -     traMADol (ULTRAM) 50 MG tablet; Take 1 tablet by mouth Every 6 (Six) Hours As Needed for Moderate Pain .    Strain of lumbar region, initial encounter      Plan: Renew tramadol;  Continue current meds;  F/U with Dr. Morales;  Apply ice to low back;  Return for re-evaluation in 3 months.      I, Dr. Connor Lopez, personally performed the services described in the documentation as scribed in my presence, and the documentation is both accurate and complete.    Connor Lopez MD   Scribed for Connor Lopez MD by Guera Suresh, OLIVIA @. 11/8/2017  8:26 AM

## 2017-11-14 ENCOUNTER — PATIENT MESSAGE (OUTPATIENT)
Dept: NEUROSURGERY | Facility: CLINIC | Age: 57
End: 2017-11-14

## 2017-11-14 RX ORDER — TRAMADOL HYDROCHLORIDE 300 MG/1
300 TABLET, EXTENDED RELEASE ORAL DAILY
Qty: 60 TABLET | Refills: 0
Start: 2017-11-14

## 2017-11-14 NOTE — TELEPHONE ENCOUNTER
Dr. Lopez ordered a prescription at her appt and patient filled it the next day. It was a 15 day supply of tramadol with added refills on the prescription.

## 2017-11-14 NOTE — TELEPHONE ENCOUNTER
---Per pt's My Chart Message---  Hi,   The new prescription provided at my last visit was for Tramadol 50 MG 1 tablet twice a day for increased pain due to fall after my back surgery.    I was requesting a refill for the Tramadol  MG which I have been taking at bedtime.     Thank you-Huma

## 2017-11-14 NOTE — TELEPHONE ENCOUNTER
Provider:  John  Caller: Huma-My chart message  Time of call:     Phone #:    Surgery:  n/a  Surgery Date: n/a    Last visit:   11/08/17  Next visit:  Not scheduled     NIKOLE:   11/09/2017 Tramadol 50mg 1960 60 15 Connor Lopez [facility] [id number] [facility] 20 1     10/14/2017 Tramadol Hcl Er 300MG 1960 30 30 Dominik Alcala [facility] [address] [place] [address] 30 1     09/14/2017 Tramadol Hcl Er 300MG 1960 30 30 [place], Dominik [facility] [address] [place] [address] 30 1    Reason for call:       Hi Renetta,     When I saw Dr Lopez last week I didn't realize I was out of refills on my Tramadol  mg until I went to fill my med box this weekend.  Would it be possible to refill this for another 30 days, then I can ask Dr Morales office to write prescriptions for my pain medications?  I would appreciate this, if possible, because I always get a quicker response from this office.     Thank you - Huma         I pended the medication if approved.

## 2017-12-05 DIAGNOSIS — M51.36 DDD (DEGENERATIVE DISC DISEASE), LUMBAR: Primary | ICD-10-CM

## 2017-12-06 RX ORDER — MELOXICAM 15 MG/1
TABLET ORAL
Qty: 30 TABLET | Refills: 2 | Status: SHIPPED | OUTPATIENT
Start: 2017-12-06 | End: 2022-08-10 | Stop reason: SDUPTHER

## 2017-12-06 NOTE — TELEPHONE ENCOUNTER
pt-    He had refereed her to PM, and has stated all pain medications need to come from them or PCP-   Ok to  Mobic?

## 2018-02-20 ENCOUNTER — OFFICE VISIT (OUTPATIENT)
Dept: NEUROSURGERY | Facility: CLINIC | Age: 58
End: 2018-02-20

## 2018-02-20 VITALS
WEIGHT: 167 LBS | SYSTOLIC BLOOD PRESSURE: 134 MMHG | HEIGHT: 66 IN | DIASTOLIC BLOOD PRESSURE: 102 MMHG | HEART RATE: 110 BPM | TEMPERATURE: 99.4 F | BODY MASS INDEX: 26.84 KG/M2

## 2018-02-20 DIAGNOSIS — M53.3 CHRONIC SI JOINT PAIN: ICD-10-CM

## 2018-02-20 DIAGNOSIS — G89.29 CHRONIC SI JOINT PAIN: ICD-10-CM

## 2018-02-20 DIAGNOSIS — M54.2 CERVICALGIA: ICD-10-CM

## 2018-02-20 DIAGNOSIS — M51.36 DDD (DEGENERATIVE DISC DISEASE), LUMBAR: Primary | ICD-10-CM

## 2018-02-20 DIAGNOSIS — M54.6 PAIN IN THORACIC SPINE: ICD-10-CM

## 2018-02-20 DIAGNOSIS — M54.50 CHRONIC MIDLINE LOW BACK PAIN WITHOUT SCIATICA: ICD-10-CM

## 2018-02-20 DIAGNOSIS — M54.50 ACUTE MIDLINE LOW BACK PAIN WITHOUT SCIATICA: ICD-10-CM

## 2018-02-20 DIAGNOSIS — M47.812 CERVICAL SPONDYLOSIS WITHOUT MYELOPATHY: ICD-10-CM

## 2018-02-20 DIAGNOSIS — M41.07 INFANTILE IDIOPATHIC SCOLIOSIS OF LUMBOSACRAL REGION: ICD-10-CM

## 2018-02-20 DIAGNOSIS — Z98.1 STATUS POST LUMBAR SPINAL FUSION: ICD-10-CM
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
## 2018-02-20 DIAGNOSIS — M47.814 THORACIC SPONDYLOSIS WITHOUT MYELOPATHY: ICD-10-CM

## 2018-02-20 DIAGNOSIS — M41.25 OTHER IDIOPATHIC SCOLIOSIS, THORACOLUMBAR REGION: ICD-10-CM

## 2018-02-20 DIAGNOSIS — G89.29 CHRONIC MIDLINE LOW BACK PAIN WITHOUT SCIATICA: ICD-10-CM

## 2018-02-20 PROCEDURE — 99213 OFFICE O/P EST LOW 20 MIN: CPT | Performed by: NEUROLOGICAL SURGERY

## 2018-02-20 RX ORDER — LISINOPRIL 10 MG/1
10 TABLET ORAL DAILY
COMMUNITY
End: 2022-08-10 | Stop reason: SDUPTHER

## 2018-02-20 NOTE — PROGRESS NOTES
Patient: Huma Montano  :  1960  Chart #:  [id number]    Date of Service: 18    Chief Complaint:   Chief Complaint   Patient presents with   • Back Pain       Back Pain   Chronicity: Mrs. Montano returns today for a follow-up on her back pain. Episode onset: She fell in 2018 and hurt her back.  On 17 she had a left L2-L3 transforaminal lumbar interbody fusion with capstone cage and right L3-L4 transforaminal lumbar interbody fusion.  The problem occurs constantly (She is a workman's comp patient who has not been able to return to work because of her back pain.). 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 The pain is present in the lumbar spine. The quality of the pain is described as aching (She states that she has to make her bed on her knees due to the fact that her pain increases when she bends.). The pain is at a severity of 6/10. The pain is mild (Her pain is mild to moderate.). The pain is worse during the day. The symptoms are aggravated by bending and standing. Stiffness is present in the morning. Associated symptoms include numbness. Risk factors: Patient has great flexibility.  She states that she stretches daily. She has tried bed rest, heat, muscle relaxant and NSAIDs (She is seeing a pain specialist and has had several injections including lumbar EMY and R SI joint injection;  she has been advised to consider a R SI joint fusion.  she is using tramadol and tizanidine.  ) for the symptoms. The treatment provided mild relief.       Radiographic Images:   none    Past Medical History:   Diagnosis Date   • Arthritis    • Back pain    • Graves disease    • Kyphosis of cervical region    • Scoliosis    • Spinal stenosis    • Wears glasses      Current Outpatient Prescriptions   Medication Sig Dispense Refill   • amphetamine-dextroamphetamine XR (ADDERALL XR) 25 MG 24 hr capsule Take 25 mg by mouth every morning.     •  "aspirin 81 MG EC tablet Take 1 tablet by mouth daily. 30 tablet 0   • gabapentin (NEURONTIN) 600 MG tablet Take 1 tablet by mouth 3 (Three) Times a Day. 90 tablet 5   • levothyroxine (SYNTHROID, LEVOTHROID) 112 MCG tablet Take 112 mcg by mouth daily.     • lisinopril (PRINIVIL,ZESTRIL) 10 MG tablet Take 10 mg by mouth Daily.     • meloxicam (MOBIC) 15 MG tablet TAKE ONE TABLET BY MOUTH DAILY WITH FOOD 30 tablet 2   • Multiple Vitamins-Minerals (MULTIVITAL PO) Take 1 tablet by mouth Daily.     • ondansetron (ZOFRAN) 4 MG tablet Take 4 mg by mouth Every 4 (Four) Hours As Needed for Nausea or Vomiting (CAN TAKE EVERY 4-6 HRS PRN).     • tiZANidine (ZANAFLEX) 4 MG tablet Take 8 mg by mouth At Night As Needed for Muscle Spasms (CAN TAKE 2 TABS AT BEDTIME, EACH TAB 4MG).     • traMADol (ULTRAM) 50 MG tablet Take 1 tablet by mouth Every 6 (Six) Hours As Needed for Moderate Pain . 60 tablet 2   • traMADol ER (ULTRAM ER) 300 MG 24 hr tablet Take 1 tablet by mouth Daily. 60 tablet 0     No current facility-administered medications for this visit.       Allergies   Allergen Reactions   • Erythromycin Nausea And Vomiting and GI Intolerance     \"INTENSE STOMACH PAIN\"   • Lortab [Hydrocodone-Acetaminophen] Other (See Comments)     SKIN BREAKDOWN     Social History     Social History   • Marital status:      Spouse name: N/A   • Number of children: N/A   • Years of education: N/A     Social History Main Topics   • Smoking status: Never Smoker   • Smokeless tobacco: Never Used   • Alcohol use No   • Drug use: No   • Sexual activity: Defer     Other Topics Concern   • None     Social History Narrative     Family History   Problem Relation Age of Onset   • No Known Problems Mother    • No Known Problems Father    • Breast cancer Neg Hx    • Ovarian cancer Neg Hx      Past Surgical History:   Procedure Laterality Date   • BREAST BIOPSY Right 2016   •  SECTION     • CYST REMOVAL      Breast   • CYST REMOVAL      " "Cervical cyst   • HERNIA REPAIR     • LUMBAR LAMINECTOMY WITH FUSION N/A 4/4/2017    Procedure: L5-S1 OSTEOTOMY; L2-L3,L3-L4 AND L5-S1 INTERBODY FUSIONS; L2 TO S1 POSTERIOR FUSION WITH PEDICLE SCREWS AND BONE GRAFT;  Surgeon: Connor Lopez MD;  Location: [facility];  Service:    • OOPHORECTOMY  1993   • ROTATOR CUFF REPAIR      3x   • SEPTOPLASTY     • SPINAL FUSION  2010   • STEROID INJECTION      LUMBAR     Review of Systems   Musculoskeletal: Positive for arthralgias, back pain, gait problem and myalgias.   Neurological: Positive for numbness.   Psychiatric/Behavioral: Positive for sleep disturbance.   All other systems reviewed and are negative.    Vitals:    02/20/18 0926   BP: (!) 134/102   BP Location: Right arm   Patient Position: Sitting   Pulse: 110   Temp: 99.4 °F (37.4 °C)   TempSrc: Temporal Artery    Weight: 75.8 kg (167 lb)   Height: 168.9 cm (66.5\")     Physical Exam  Neurologic Exam  Constitutional: She is oriented to person, place, and time. Vital signs are normal. She appears well-developed and well-nourished. No distress.   Neat healthy female   Neck: Trachea normal and normal range of motion. No thyroid mass present.   Minimal neck stiffness; No Spurling's or L'Hermitte's signs; Shoulder ROM normal   Musculoskeletal:   Thoracic back: She exhibits pain. She exhibits no tenderness and no deformity.   Lumbar back: She exhibits pain. She exhibits normal range of motion, no deformity and no spasm.   Healed lumbar incision; Mild stiffness; SLR negative; Hip ROM normal    Tender to palpation of R buttock and ischial tuberosity  Tender trigger point over R SI joint.      Mental Status   Oriented to person, place, and time.   Attention: normal. Concentration: normal.   Speech: speech is normal   Level of consciousness: alert  Knowledge: good and consistent with education.   Normal comprehension.       Cranial Nerves   Cranial nerves II through XII intact.       Motor Exam   Muscle bulk: normal  Overall " muscle tone: normal      Strength   Strength 5/5 throughout.       Sensory Exam   Light touch normal.   Proprioception normal.       Gait, Coordination, and Reflexes       Gait  Gait: normal      Tremor   Resting tremor: absent  Intention tremor: absent  Action tremor: absent      Reflexes   Right biceps: 1+  Left biceps: 1+  Right triceps: 1+  Left triceps: 1+  Right patellar: 2+  Left patellar: 2+  Right achilles: 1+  Left achilles: 1+  Right Scherer: absent  Left Scherer: absent  Right ankle clonus: absent  Left ankle clonus: absent  LATASHA normal; Right handed       Huma was seen today for back pain.    Diagnoses and all orders for this visit:    DDD (degenerative disc disease), lumbar    Cervical spondylosis without myelopathy    Cervicalgia    Fall, initial encounter    Infantile idiopathic scoliosis of lumbosacral region    Thoracic spondylosis without myelopathy    Chronic midline low back pain without sciatica    Acute midline low back pain without sciatica    Status post lumbar spinal fusion    Pain in thoracic spine    Other idiopathic scoliosis, thoracolumbar region    Chronic SI joint pain      Plan:   Continue current medications;  Continue daily exercise;  F/U with Dr. Ochoa;  Consider a second SI joint block;  Return for re-evaluation in May 2018.    I, Dr. Connor Lopez, personally performed the services described in the documentation as scribed in my presence, and the documentation is both accurate and complete.    Connor Lopez MD

## 2018-02-27 DIAGNOSIS — M54.2 CERVICALGIA: ICD-10-CM

## 2018-02-27 DIAGNOSIS — G89.29 CHRONIC MIDLINE LOW BACK PAIN WITHOUT SCIATICA: ICD-10-CM

## 2018-02-27 DIAGNOSIS — M54.6 PAIN IN THORACIC SPINE: ICD-10-CM
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
## 2018-02-27 DIAGNOSIS — M54.41 CHRONIC BILATERAL LOW BACK PAIN WITH RIGHT-SIDED SCIATICA: ICD-10-CM

## 2018-02-27 DIAGNOSIS — M54.50 ACUTE MIDLINE LOW BACK PAIN WITHOUT SCIATICA: ICD-10-CM

## 2018-02-27 DIAGNOSIS — M47.814 THORACIC SPONDYLOSIS WITHOUT MYELOPATHY: ICD-10-CM

## 2018-02-27 DIAGNOSIS — G89.29 CHRONIC BILATERAL LOW BACK PAIN WITH RIGHT-SIDED SCIATICA: ICD-10-CM

## 2018-02-27 DIAGNOSIS — M47.812 CERVICAL SPONDYLOSIS WITHOUT MYELOPATHY: ICD-10-CM

## 2018-02-27 DIAGNOSIS — M41.25 OTHER IDIOPATHIC SCOLIOSIS, THORACOLUMBAR REGION: ICD-10-CM

## 2018-02-27 DIAGNOSIS — M41.07 INFANTILE IDIOPATHIC SCOLIOSIS OF LUMBOSACRAL REGION: ICD-10-CM

## 2018-02-27 DIAGNOSIS — M54.50 CHRONIC MIDLINE LOW BACK PAIN WITHOUT SCIATICA: ICD-10-CM

## 2018-02-27 DIAGNOSIS — Z98.1 STATUS POST LUMBAR SPINAL FUSION: ICD-10-CM

## 2018-02-28 RX ORDER — TRAMADOL HYDROCHLORIDE 50 MG/1
TABLET ORAL
Qty: 60 TABLET | Refills: 1 | OUTPATIENT
Start: 2018-02-28

## 2018-02-28 RX ORDER — GABAPENTIN 600 MG/1
TABLET ORAL
Qty: 90 TABLET | Refills: 4 | OUTPATIENT
Start: 2018-02-28

## 2018-03-03 ENCOUNTER — PATIENT MESSAGE (OUTPATIENT)
Dept: NEUROSURGERY | Facility: CLINIC | Age: 58
End: 2018-03-03

## 2018-03-05 DIAGNOSIS — M54.41 CHRONIC BILATERAL LOW BACK PAIN WITH RIGHT-SIDED SCIATICA: ICD-10-CM

## 2018-03-05 DIAGNOSIS — G89.29 CHRONIC BILATERAL LOW BACK PAIN WITH RIGHT-SIDED SCIATICA: ICD-10-CM

## 2018-03-05 RX ORDER — GABAPENTIN 600 MG/1
600 TABLET ORAL 3 TIMES DAILY
Qty: 90 TABLET | Refills: 5 | OUTPATIENT
Start: 2018-03-05 | End: 2018-09-15 | Stop reason: SDUPTHER

## 2018-03-05 NOTE — TELEPHONE ENCOUNTER
Provider:  John  Caller: Huma Montano  Time of call:   03:33 PM  Phone #:  [phone]  Surgery:  L5-S1 OSTEOTOMY; L2-L3,L3-L4 AND L5-S1 INTERBODY FUSIONS; L2 TO S1 POSTERIOR FUSION WITH PEDICLE SCREWS AND BONE GRAFT  Surgery Date:  04/04/17  Last visit:   02/20/18  Next visit: none scheduled    NIKOLE:      11/30/2017 Gabapentin 600MG 1960 90 30 Connor Lopez  01/03/2018 Gabapentin 600MG 1960 90 30 Connor Lopez  02/01/2018 Gabapentin 600MG 1960 90 30 Connor Lopez     Reason for call:     ----- Message from Huma Montano sent at 3/3/2018  3:33 PM EST -----  Regarding: Prescription Question  Contact: [phone]    I saw Dr. Lopez on 02-20-18 and was unaware my Gabapentin for post surgical neuropathy was in need of refills.  I will have my pain mgmt MD take care of the Tramadol.  I have attached a photo of my current prescription that was last filled on 01-30-18.  Thank you - Huma Montano

## 2018-03-05 NOTE — TELEPHONE ENCOUNTER
From: Huma Montano  To: Connor Lopez MD  Sent: 3/3/2018 3:33 PM EST  Subject: Prescription Question    I saw Dr. Lopez on 02-20-18 and was unaware my Gabapentin for post surgical neuropathy was in need of refills. I will have my pain mgmt MD take care of the Tramadol. I have attached a photo of my current prescription that was last filled on 01-30-18. Thank you - Huma Montano

## 2018-03-23 NOTE — PROGRESS NOTES
LOS: 3 days   Patient Care Team:  Martinez Cameron MD as PCP - General (Family Medicine)  Martinez Cameron MD as Referring Physician (Family Medicine)  Guillaume Morales II, MD (Pain Medicine)    Chief Complaint:  POD # 3        Interval History: back painful;  R hip sore and tender;  Has been up and walking;  Has had some headache;  Voiding ok;  Taking po well      Review of Systems:  done      Vital Signs  Temp:  [98 °F (36.7 °C)-98.6 °F (37 °C)] 98.6 °F (37 °C)  Heart Rate:  [76-86] 76  Resp:  [16] 16  BP: ()/(49-97) 105/61    Intake/Output Summary (Last 24 hours) at 04/07/17 0712  Last data filed at 04/06/17 1822   Gross per 24 hour   Intake                0 ml   Output             2425 ml   Net            -2425 ml     Physical Exam: dressing dry;  Wound healing well;  No drainage or subq fluid;  Neuro exam normal.  R hip tender to palpation.        Results Review:     Lab Results   Component Value Date    WBC 18.12 (H) 04/05/2017    HGB 12.3 04/05/2017    HCT 36.9 04/05/2017    MCV 98.7 04/05/2017     04/05/2017     Lab Results   Component Value Date    GLUCOSE 94 03/26/2017    CALCIUM 9.9 07/17/2016     07/17/2016    K 3.6 07/17/2016    CO2 29.0 07/17/2016     07/17/2016    BUN 16 07/17/2016    CREATININE 0.90 07/17/2016    EGFRIFNONA 65 07/17/2016    BCR 17.8 07/17/2016    ANIONGAP 11.0 07/17/2016           Assessment/Plan     Active Problems:    Scoliosis deformity of spine      Plan:  Light activity;  Monitor headache and wound;  Ice to R hip;  Home when feeling better.      Plan for disposition      Connor Lopez MD  04/07/17  7:12 AM         3/21/18 patient had an appointment, She is following up to see if the Disability paperwork has been faxed. Please call Patient to confirm

## 2018-03-27 ENCOUNTER — TRANSCRIBE ORDERS (OUTPATIENT)
Dept: PHYSICAL THERAPY | Facility: CLINIC | Age: 58
End: 2018-03-27

## 2018-03-27 ENCOUNTER — TREATMENT (OUTPATIENT)
Dept: PHYSICAL THERAPY | Facility: CLINIC | Age: 58
End: 2018-03-27

## 2018-03-27 DIAGNOSIS — Z98.1 HISTORY OF LUMBAR FUSION: ICD-10-CM

## 2018-03-27 DIAGNOSIS — M54.5 LOW BACK PAIN, UNSPECIFIED BACK PAIN LATERALITY, UNSPECIFIED CHRONICITY, WITH SCIATICA PRESENCE UNSPECIFIED: ICD-10-CM

## 2018-03-27 DIAGNOSIS — R29.3 POSTURE IMBALANCE: ICD-10-CM

## 2018-03-27 DIAGNOSIS — S33.2XXA: Primary | ICD-10-CM

## 2018-03-27 DIAGNOSIS — M53.3 SACROILIAC JOINT DYSFUNCTION: Primary | ICD-10-CM

## 2018-03-27 PROCEDURE — 99070 SPECIAL SUPPLIES PHYS/QHP: CPT | Performed by: PHYSICAL THERAPIST

## 2018-03-27 PROCEDURE — 97530 THERAPEUTIC ACTIVITIES: CPT | Performed by: PHYSICAL THERAPIST

## 2018-03-27 PROCEDURE — 97001 PR PHYS THERAPY EVALUATION: CPT | Performed by: PHYSICAL THERAPIST

## 2018-03-27 PROCEDURE — 97140 MANUAL THERAPY 1/> REGIONS: CPT | Performed by: PHYSICAL THERAPIST

## 2018-03-27 NOTE — PROGRESS NOTES
Initial Physical Therapy Evaluation and Plan of Care    Chief Complaint:   Right SI Joint Pain    Background History:    Pt fell, 17, when walking down ramp of a patient's home in rain.  She was holding on the railing with her left hand and her feet went up in the air and she landed on her right back, buttocks. She was able to get up and walk however, 3 days later pain ramped up.  She reports she was feeling good following her spinal surgery in 2017 and was concerned this fall may have compromised her surgery however x-rays showed no compromise.  She went to [facility] in [place] and it increaded her pain.  Her had a Right SI block on 17 which helped a little, then had a facet block on 17 which made her pain worse.  She had a 2nd SI joint block on 18 which decreased her pain from 7/10 to 1/10 for 5 days.  The pain has increased back up to 5/10 today.     Past Medical History:  1.  DDD (degenerative disc disease), lumbar  2.  Cervical spondylosis without myelopathy  3.  Cervicalgia  4.  Left rotator cuff repair May 2012;  Left rotator cuff repair with biceps, Aug 2012;  Right rotator cuff repair,   5.  Infantile idiopathic scoliosis of lumbosacral region  6.  Thoracic spondylosis without myelopathy  7.  Chronic midline low back pain without sciatica  8.  Acute midline low back pain without sciatica  9.  Status post lumbar spinal fusion, L1-S1, 17  10.  Pain in thoracic spine  11.  idiopathic scoliosis, thoracolumbar region  12.  Chronic SI joint pain  13.  Graves Disease  14.  Allergies to grass  15.  Left inguinal hernia repair,   16.  Bilateral septoplasty,    17.  Hx of elevated bp secondary to pain  18.  Left distal tibia/fibula fx,   19.  Left ankle sprain, 2 months ago, currently wearing air cast   20.  Hx of 2 normal vaginal deliveries and 1 , 29,27 and 25 years ago  21.  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  She used to exercise daily with resistive training but now she is only able to do core and stretching exercises. She is no longer able to do recreational activities secondary to her pain.        SUBJECTIVE: Pt reports pain ranging from 1/10 to 5/10 in the area of the right SI joint.  This pain is constant, but varies in intensity depending on her activity/positioning.  She describes her pain as aching. Pain increases with leaning over, sitting, standing,and right side lying. Pain decreases with changing position, stretching and using both heat and ice.  She is not able to identify a \"best\" time of day.  HEr worst time of day is after activity and bedtime.  She sleeps on her right side and since her surgery she has been able to sleep in prone.  She uses a \"J\" pillow for her neck and 2 body pillows.  She sleeps on a firm coil spring mattress.  She complains of numbness/gingling in there right foot since 2010, not related to current injury.        Functional Limitations: housework, changing positions, grocery shopping, sitting, standing, walking, stairs, driving, working and sleeping.    Patient Goals for Physical Therapy:  To get home exercise program for improved pelvic positioning and to decrease SI joint pain.        OBJECTIVE:    Postural Assessment: wearing aircast on left ankle, secondary to sprain 2-3 months ago; over pronation on let foot but on toe rise over supinates on left    Yes No   Forward head slight    Increased AO extension slight          Presentation:    Cervical Spine From C7    Thoracic spine Slight flexion     Lumbar Spine flattened          Right Left   Shoulder elevated slight    Scapula position               Winging               Protracted               Anterior Tilt               Elevated     Rib Hump               Upper  slight             Lower   "   Lateral Shift of Pelvis slight    Gluteal Fold Elevated  slight   Atrophy of Gluteus Max slight    Standing Hip Position             External Rotation slight             Internal Rotation     Knee              Valgus             Varum              Hyperextended     Foot/Ankle            Supinated            Pronated            Pes Planus            Increased Long Arch     Calcaneous angulation:            Valgus            Varum     Hallux Valgus     Hammertoe            Monthly Objective Measurement/Functional Activity    Date: 03/27/2018          Oswestry Pain Score 60/100                                AROM Lumbar Spine: Degrees   Degrees      Degrees      Degrees      Degrees      Degrees      Degrees    Degrees      Flexion 120 pain right                        Extension 35 pain right                        Right Lat. Flexion 45                       Left Lat. Flexion 33                       Right Lat Shift Pelvis  no change                       Left Lat Shift Pelvis Inc                                AROM Hips:  (degrees) Right      Left Right Left Right  Left Right  Left Right  Left Right  Left Right  Left Right  Left      Flexion 125      120                       Abduction 38       35                       Int. Rotation 26       20                       Ext. Rotation  40       49                               Flexibility:   (degrees) Right    Left Right  Left Right  Left Right  Left Right  Left Right  Left Right  Left Right  Left      Hamstrings -10       -19                      Quadriceps 39       32                      HipExt/Rot(piriformis) 39       46             Hip Int/Rotators 23       26             Hip Flexors (iliopsoas) Not tested                     IT Band Not tested                              Reflexes: Right      Left Right  Left Right  Left Right  Left Right  Left Right  Left Right  Left Right  Left      L4 (Quad) -       -             S1 (Achilles) -       -                     Root  Level  - Motor Right      Left Right  Left Right  Left Right  Left Right  Left Right  Left Right  Left Right  Left     T12             Rectus Abdominus 4+/5             L1              Paraspinals 5/5           5/5             L2              Hip Flexion 5/5           5/5             L3             Quads Not tested             L4              Anterior Tibialis 5/5          5/5             L5             Gr. Toe Extension 5/5          5/5             S1            Gastroc/Sol/Peroneals 5/5        4+/5                     Functional Strength:             Single LegStanceTime (seconds) R:6 inc   L:1    R:      L: R:      L: R:     L: R:      L: R:      L: R:      L: R:      L:     Pelvic Floor Isolation (seconds) -             Inner Unit  Isolation (seconds) -                     Functional Activities:              Sit w/o pain? Pain increases (minutes) Yes/ 30 min             Stand w/o pain? Yes/ 30 min             Walk w/o pain? No/ 15 min             Steps w/o pain? No/ 1 fl             Drive w/o pain? No/ 15-30 min             Work w/o pain? No/ on light duty             # times wakes w/ pain? Only sleeps 1 hr at a time, then wakes with pain                   03/27/2018                 SI Joint Positioning  Right  Left Right  Left Right  Left Right  Left Right  Left Right  Left Right  Left Right  Left Right  Left Right  Left Right  Left Right  Left Right  Left Right  Left Right  Left       Innominate                         Anterior   x                         Posterior               x                         Sacrum                            Unilateral rotation   x                                      SI Joint Testing  Right  Left Right  Left Right  Left Right  Left Right  Left Right  Left Right  Left Right  Left Right  Left Right  Left Right  Left Right  Left Right  Left Right  Left Right  Left           Distraction    +           +                         Lashell's    +       slight                          "Compression     -         -                         Prone Press Up   No change                                   Special Tests  Right   Left Right  Left Right  Left Right  Left Right  Left Right  Left Right Left  Right  Left Right  Left Right  Left Right  Left Right  Left Right  Left Right Left  Right  Left      Straight Leg Raise                                  Active    -         -                           Passive   -          -                          Hip Scour Test   Sl        -                                                               Palpation:         Muscle/Bone Irritation  Date 03/27/2018                  Right  Left Right  Left Right  Left Right  Left Right  Left Right  Left Right  Left Right  Left Right  Left Right  Left Right  Left Right  Left Right  Left Right  Left Right  Left   Piriformis  ++        +                 Gr. Trochanter  +        sl                 IT Band  -        -                 Quadratus Lumborum  sl        sl                 Ischial Tuberosity  -        -                 Sacrococcygeal Ligs  +      slight                 Paraspinals  +     sl                 Spinous Processes                                     T-spine rotated to   -           -                        L-spine rotated to  L2-5     -                 Adductor Aden  -          ++                 Iliopsoas  slight   ++                 Quad Origin slight slight                  SI Joint  ++        -                 Pubic Symphysis           ++                                   Rectus Diastasis 2 1/2finger                                     Leg Length:  The left lower extremity is 1/4\" shorter than the right      PLAN OF CARE:    ASSESSMENT:  Problem List:   1. SI joint dysfunction  2. Lack of spinal stabilization  3. Postural dysfunction     Discussion:    Huma arrives with significant SI joint dysfunction.  Manual posterior rotation of the right innominate and the use of positional isometric contraction " (PIC) to the left iliopsoas brought symmetry to her innominates.  This was followed by PIC to the right glut min and left piriformis to bring the sacrum back to neurtral positioning.  There appeared to be slight facet restriction at S1 which was also addressed with PIC.  Following all of this, she had significant decreased pain.  [employer] were put in her shoes to decrease the ground rxn forces into the SI ligaments and an SI belt was put on her to decrease the gravitational forces into the SI ligaments.  She was able to walk with less activity in both piriformis and less pain.   She was instructed in proper sitting posturing and how to self correct the positioning of the SI joints.  She had a good understanding of this.  Depending on how she responds to this over the next week, will instruct in exercises to minimize posterior disc pressures and then begin spinal stabilization and neuro motor re-training of the muscles of the outer unit used in gait.      Short Term Goals: (4-6 weeks)                               Date Met:                1.   pt independent in postural correction  2.   pt independent in SI joint self-corrections  3.   pt independent in exer to decrease post. disc pressures  4.   pt able to sleep through night without pain  5.   pt able to sit 40 minutes without pain  6.   pt able to walk 20 minutes without pain  7.   Reduce pt pain to no greater than 3/10   8.   Decrease Oswestry Pain Score to no greater than 50/100    Long Term Goals:(3-5 months)      Date Met:      1.  pt independent in self-management of symptoms       2.  pt independent in home program      3.  pt able to work 6 hours without pain      4.  pt able to sit 60 minutes without pain      5.  pt able to walk 45 min without pain    Treatment Plan:   1.  Ultrasound to increase extensibility, decrease pain, if needed  2.  Electrical stimulation for muscle relaxation, decrease pain, if needed, iontophoresis  3.  Manual therapy  to increase function, decrease pain  4.  Therapeutic exercise to increase function, decrease pain  5.  Home programming and d/c planning to increase function and decrease pain    Frequency & Duration:  Pt will be seen on a once/week basis for 12 more visits    Patient Participated in and Agrees With This Plan of Care and Goals:  YES  Rehab Potential:  Fair to good      Evelyn Velez, PT, MS  Physical Therapist  [place]# [id number]      TREATMENT TODAY:    Total Treatment Time: (time in clinic)   80 min  Timed Code Treatment Minutes:  80 min      Evaluation:  [id number]    Supplies given:    SI Belt   Pair of orthotics                 Manual Therapy:  (MA)  RX min:  20  Manual posterior rotation of right innominate    Positional Isometric contraction (PIC) of left iliopsoas    Positional Isometric contraction of (PIC) right gluteus minimus    Distraction of both SI jts in open pack hip position  Piriformis stretching, bilaterally    Quadratus lumborum stretching, bilaterally    Anterior/Inferior Mobilization of  right hip    Positional Isometric Contraction of multifidus    Therapeutic Activities:  (TA)  RX min: 20    Neuromuscular Reeducation: (NMR)  RX min:     Ultrasound:  RX min:          1.6 vogel/cm2       Location:     Iontophoresis:  6 hr patch                                Location:                               Procedures:      Key:  i=instructed        r=review        c=corrected        a=adapted   Code Procedure/Instruction 03/27/2018                  TA         Sleeping Positions instructed                                TA Sternum-Up Posture instructed                  TA SI jt. self-correction instructed                  TA Lumbar Facet PIC                   TA   Order of Self-Corrections instructed                  TA Use of lumbar pillow instructed                  TA Use of cervical roll instructed                  TA Use of orthotics instructed                  TA Use of SI belt instructed                  TA  Use of Nada chair                   TA Use of Ice                   TA Use of [employer]/ heat                   TA Use of TENS unit                   TA Use of iontophoresis                   TA Decreasing Disc Pressures                                       NMR Pelvic Floor Isolation                   NMR Transverse Abdominus                   NMR Multifidus Isolation                   NMR InnerUnit against Gravity                   NMR Sit-stand w/ inner unit                   NMR Roll w/ inner unit                   NMR Walk w/ inner unit                    NMR Up/down steps w/ inner unit                   NMR Water Exer Instruction                   NMR Hip Abd w/o piriformis                   NMR Hip Add w/o iliop/ham                    NMR Lower abs in supine                   NMR Abd obliques in supine                   NMR Prone Knee Flexion                   NMR Prone glut vern                   NMR Retro Step                   NMR Retro Walk                   NMR Retro walk on treadmill                   NMR Forward walk w/ inner unit                   NMR Balance Instruction                   NMR Stability Ball A/P & Lateral                   NMR Ball Catch/Throw /Supine                   NMR Ball Catch/Throw /Stand                   NMR StabilityBall All 4's Arm Lift                   NMR StabilityBall Prone Walk Out                   NMR StabilityBall Supine Oblique                   NMR Stability Ball sit-supine-sit                   NMR Walking Prog Progression                   MNR Home program sequencing                                       TA Hamstring stretch                   TA Hip Ext Rot Stretch                   TA Hip Int Rot Stretch                   TA Hip Flex/IT Stretch                   TA Hip Add Stretch                   TA Lats Dorsi Stretch                   TA Gastroc/soleus stretch                   TA QuadratusLumborm Stretch                      Supine                       Stance                   TA Quad Stretch                                       NMR Wall Push Ups                   NMR Planking                   NMR BOSU Ball Exercises                   NMR Lateral Bridge Drop                   NMR [place]                   NMR O'Feldt Lat Pull Down                   NMR O'Feldt Hip Ext                   NMR O'Feldt Trunk Ext                                       TA CervDiscExtSup/stnd                   TA Cerv Stretches                   TA Self PIC Cervical Spine                   TA Neural Gliding                   TA Ant/Mid Scalene Strch                   TA Biceps stretch                   TA Rotator Cuff Stretch                   TA Anterior Chest Stretch                   TA Wrist Ext Stretch                                       NMR Prone Arm Lifts                   NMR Scapula Stabilization                   NMR Occulomotor Isometrics                   NMR Cervical Isometrics                                       TA Shld AROM w/bar                   TA Shld Capsular Stretching                   TA Self PIC Thor Spine                   TA Rot Cuff Therabd, beginner                   TA Rot Cuff Therabd, intermed                                                                                                                                                                                                                                                 Evelyn Velez, PT, MS  Physical Therapist  [place]# [id number]

## 2018-04-02 ENCOUNTER — TELEPHONE (OUTPATIENT)
Dept: NEUROSURGERY | Facility: CLINIC | Age: 58
End: 2018-04-02

## 2018-04-02 NOTE — TELEPHONE ENCOUNTER
Provider: John   Caller: My chart Message--Huma  Surgery:  L5-S1 OSTEOTOMY; L2-L3,L3-L4 AND L5-S1 INTERBODY FUSIONS; L2 TO S1 POSTERIOR FUSION WITH PEDICLE SCREWS AND BONE GRAFT  Surgery Date:  03/05/2018  Last visit:   02/20/2018  Next visit: 05/21/18    Reason for call:         ----- Message from Huma Montano sent at 4/2/2018 11:11 AM EDT -----  Regarding: Referral Request  Contact: [phone]  Dr Lopez,  I'm still under the care of the worker's compensation MD for my SI joint pain from my fall on 09-17-17.  The fusion of my right SIJ has been suggested as the treatment plan. I would feel more confident to stay in the group, so I am Inquiring if Dr. Quintana performs the minimal invasive surgery to fuse the joint. If so i would like to request a referral and set an appt as soon as possible.  I only have one opportunity to change MDs and  an RAINA has been scheduled on 04-16, by the insurance company with their MD. I'm not sure what that means but I do know I  would prefer one of your associates because I  trust your opinion and knowledge of my care. Thank you for looking into this.

## 2018-04-02 NOTE — TELEPHONE ENCOUNTER
Huma Lopez MD 22 minutes ago (2:33 PM)         That is unfortunate for me.  My physical therapist told me Dr. Quintana did the procedure even though he was a neurosurgeon.  Thank you for addressing this so quickly.  Any suggestions as far as an orthopedic MD that specializes back surgery would be welcomed.          You   Huma Montano 2 hours ago (12:49 PM)         Hello,     Unfortunately, Dr. Quintana does not do SI joint fusions.  That is something you may be able to have evaluated with an orthopedist.  Please let us know if there is anything else we can do to help.     Have a good day,     KAJAL Leiva, CMA

## 2018-04-02 NOTE — TELEPHONE ENCOUNTER
She should follow up with Dr. Ochoa as Dr. Lopez suggested at her last appt.  I believe he may offer that surgery

## 2018-04-05 ENCOUNTER — TREATMENT (OUTPATIENT)
Dept: PHYSICAL THERAPY | Facility: CLINIC | Age: 58
End: 2018-04-05

## 2018-04-05 DIAGNOSIS — R29.3 POSTURE IMBALANCE: ICD-10-CM

## 2018-04-05 DIAGNOSIS — M53.3 SACROILIAC JOINT DYSFUNCTION: Primary | ICD-10-CM

## 2018-04-05 PROCEDURE — 97035 APP MDLTY 1+ULTRASOUND EA 15: CPT | Performed by: PHYSICAL THERAPIST

## 2018-04-05 PROCEDURE — 97112 NEUROMUSCULAR REEDUCATION: CPT | Performed by: PHYSICAL THERAPIST

## 2018-04-05 PROCEDURE — 97140 MANUAL THERAPY 1/> REGIONS: CPT | Performed by: PHYSICAL THERAPIST

## 2018-04-05 NOTE — PROGRESS NOTES
"Physical Therapy Note      SUBJECTIVE:   Changes since last seen:  \"Not too bad today!\"  \"It still hurts stepping up on a curb, going up and down steps and getting up and down from a chair.\"  She did the SI joint self corrections which helped decrease pain.  She got a short Don Kerline ankle brace now and it feels better than wearing the air cast for the ankle sprain she sustained when making her bed the last week in Feb.     Current level of pain:    4-5/10  Lowest level of pain since last seen:  4-5/10  Highest level of pain since last seen:  7-8/10 last night by end of day.     Past Medical History:  1.  DDD (degenerative disc disease), lumbar  2.  Cervical spondylosis without myelopathy  3.  Cervicalgia  4.  Left rotator cuff repair May 2012;  Left rotator cuff repair with biceps, Aug 2012;  Right rotator cuff repair,   5.  Infantile idiopathic scoliosis of lumbosacral region  6.  Thoracic spondylosis without myelopathy  7.  Chronic midline low back pain without sciatica  8.  Acute midline low back pain without sciatica  9.  Status post lumbar spinal fusion, L1-S1, 17  10.  Pain in thoracic spine  11.  idiopathic scoliosis, thoracolumbar region  12.  Chronic SI joint pain  13.  Graves Disease  14.  Allergies to grass  15.  Left inguinal hernia repair,   16.  Bilateral septoplasty,    17.  Hx of elevated bp secondary to pain  18.  Left distal tibia/fibula fx,   19.  Left ankle sprain, 2 months ago, currently wearing air cast   20.  Hx of 2 normal vaginal deliveries and 1 , 29,27 and 25 years ago  21.  Last menses,     Patient Goals for Physical Therapy:  To get home exercise program for improved pelvic positioning and to decrease SI joint pain.        OBJECTIVE:     2018                SI Joint Positioning  Right  Left Right  Left Right  Left Right  Left Right  Left Right  Left Right  Left Right  Left Right  Left Right  Left Right  Left Right  Left Right  Left Right  " Left Right  Left       Innominate                         Anterior   x   x                        Posterior               x              x                        Sacrum                            Unilateral rotation   x  x                                     SI Joint Testing  Right  Left Right  Left Right  Left Right  Left Right  Left Right  Left Right  Left Right  Left Right  Left Right  Left Right  Left Right  Left Right  Left Right  Left Right  Left           Distraction    +           +   +        +                        Lashell's    +       slight   +        -                        Compression     -         -   -        -                        Prone Press Up   No change   -        -                                  Special Tests  Right   Left Right  Left Right  Left Right  Left Right  Left Right  Left Right Left  Right  Left Right  Left Right  Left Right  Left Right  Left Right  Left Right Left  Right  Left      Straight Leg Raise                                  Active    -         -                           Passive   -          -                          Hip Scour Test   Sl        -                                                                 Palpation:      Muscle/Bone Irritation  Date 03/27/2018 04/05/18                 Right  Left Right  Left Right  Left Right  Left Right  Left Right  Left Right  Left Right  Left Right  Left Right  Left Right  Left Right  Left Right  Left Right  Left Right  Left   Piriformis  ++        +   +         +                Gr. Trochanter  +        sl   +        sl                IT Band  -        -   -          -                Quadratus Lumborum  sl        sl   sl      sl                Ischial Tuberosity  -        -   -            -                Sacrococcygeal Ligs  +      slight Not tested                Paraspinals  +     sl   +                Spinous Processes                                     T-spine rotated to   -           -                        L-spine rotated  "to  L2-5     -  L1-5                Adductor Aden  -          ++  sl          +                Iliopsoas  slight   ++  sl          +                Quad Origin slight slight    -         -                SI Joint  ++        -    +        sl                Pubic Symphysis           ++ Not tested                                  Rectus Diastasis 2 1/2finger                                   Leg Length:  The left lower extremity is 1/4\" shorter than the right    Monthly Objective Measurement/Functional Activity  Date: 03/27/2018          Oswestry Pain Score 60/100                                AROM Lumbar Spine: Degrees   Degrees      Degrees      Degrees      Degrees      Degrees      Degrees    Degrees      Flexion 120 pain right                        Extension 35 pain right                        Right Lat. Flexion 45                       Left Lat. Flexion 33                       Right Lat Shift Pelvis  no change                       Left Lat Shift Pelvis Inc                                AROM Hips:  (degrees) Right      Left Right Left Right  Left Right  Left Right  Left Right  Left Right  Left Right  Left      Flexion 125      120                       Abduction 38       35                       Int. Rotation 26       20                       Ext. Rotation  40       49                               Flexibility:   (degrees) Right    Left Right  Left Right  Left Right  Left Right  Left Right  Left Right  Left Right  Left      Hamstrings -10       -19                      Quadriceps 39       32                      HipExt/Rot(piriformis) 39       46             Hip Int/Rotators 23       26             Hip Flexors (iliopsoas) Not tested                     IT Band Not tested                              Reflexes: Right      Left Right  Left Right  Left Right  Left Right  Left Right  Left Right  Left Right  Left      L4 (Quad) -       -             S1 (Achilles) -       -                     Root Level  - Motor " Right      Left Right  Left Right  Left Right  Left Right  Left Right  Left Right  Left Right  Left     T12             Rectus Abdominus 4+/5             L1              Paraspinals 5/5           5/5             L2              Hip Flexion 5/5           5/5             L3             Quads Not tested             L4              Anterior Tibialis 5/5          5/5             L5             Gr. Toe Extension 5/5          5/5             S1            Gastroc/Sol/Peroneals 5/5        4+/5                     Functional Strength:             Single LegStanceTime (seconds) R:6 inc   L:1    R:      L: R:      L: R:     L: R:      L: R:      L: R:      L: R:      L:     Pelvic Floor Isolation (seconds) -             Inner Unit  Isolation (seconds) -                     Functional Activities:              Sit w/o pain? Pain increases (minutes) Yes/ 30 min             Stand w/o pain? Yes/ 30 min             Walk w/o pain? No/ 15 min             Steps w/o pain? No/ 1 fl             Drive w/o pain? No/ 15-30 min             Work w/o pain? No/ on light duty             # times wakes w/ pain? Only sleeps 1 hr at a time, then wakes with pain            PLAN OF CARE:    ASSESSMENT:  Problem List:   1. SI joint dysfunction  2. Lack of spinal stabilization  3. Postural dysfunction     Discussion:    Huma experienced a severe left quad spasm while in supine on treatment table.  This kept re-occurring so used ultrasound over the quad and was able to get it to stop. She notes she has been having increased frequency of these episodes over the last 3 weeks. Recommended she try Theraworx Relief, on over the counter foam that minimizes spasming.    She did well with the inner unit isolation instruction.  She tends to hold her breath when isolating all 3 muscles of the inner unit.  She understands the need to not do this. Went over the postural activities to be attentive to, to minimize the posterior disc pressures above her  fusion.      Short Term Goals: (4-6 weeks)                               Date Met:                1.   pt independent in postural correction     04/05/18  2.   pt independent in SI joint self-corrections    04/05/18  3.   pt independent in exer to decrease post. disc pressures  4.   pt able to sleep through night without pain  5.   pt able to sit 40 minutes without pain  6.   pt able to walk 20 minutes without pain  7.   Reduce pt pain to no greater than 3/10   8.   Decrease Oswestry Pain Score to no greater than 50/100    Long Term Goals:(3-5 months)      Date Met:      1.  pt independent in self-management of symptoms       2.  pt independent in home program      3.  pt able to work 6 hours without pain      4.  pt able to sit 60 minutes without pain      5.  pt able to walk 45 min without pain    Treatment Plan:   1.  Ultrasound to increase extensibility, decrease pain, if needed  2.  Electrical stimulation for muscle relaxation, decrease pain, if needed, iontophoresis  3.  Manual therapy to increase function, decrease pain  4.  Therapeutic exercise to increase function, decrease pain  5.  Home programming and d/c planning to increase function and decrease pain    Frequency & Duration:  Pt will be seen on a once/week basis for 11 more visits    Patient Participated in and Agrees With This Plan of Care and Goals:  YES  Rehab Potential:  Fair to good      Evelyn Velez, PT, MS  Physical Therapist  [place]# [id number]      TREATMENT TODAY:    Total Treatment Time: (time in clinic)   60 min  Timed Code Treatment Minutes: 60      Supplies given:     Manual Therapy:  (MA)  RX min:  20  Manual posterior rotation of right innominate    Positional Isometric contraction (PIC) of left iliopsoas    Positional Isometric contraction of (PIC) right gluteus minimus    Distraction of both SI jts in open pack hip position  Piriformis stretching, bilaterally    Quadratus lumborum stretching, bilaterally    Anterior/Inferior Mobilization of   right hip    Positional Isometric Contraction of multifidus    Therapeutic Activities:  (TA)  RX min: 5    Neuromuscular Reeducation: (NMR)  RX min: 25    Ultrasound:  RX min:  10       1.6 vogel/cm2       Location:  Left quad    Iontophoresis:  6 hr patch                                Location:                               Procedures:      Key:  i=instructed        r=review        c=corrected        a=adapted   Code Procedure/Instruction 03/27/2018 04/05/18                 TA         Sleeping Positions instructed                                TA Sternum-Up Posture instructed                  TA SI jt. self-correction instructed reviewed                 TA Lumbar Facet PIC                   TA   Order of Self-Corrections instructed reviewed                 TA Use of lumbar pillow instructed                  TA Use of cervical roll instructed                  TA Use of orthotics instructed                  TA Use of SI belt instructed                  TA Use of Nada chair                   TA Use of Ice                   TA Use of [employer]/ heat                   TA Use of TENS unit                   TA Use of iontophoresis                   TA Decreasing Disc Pressures  instructed                                     NMR Pelvic Floor Isolation  instructed                 NMR Transverse Abdominus  instructed                 NMR Multifidus Isolation  instructed                 NMR InnerUnit against Gravity                   NMR Sit-stand w/ inner unit                   NMR Roll w/ inner unit                   NMR Walk w/ inner unit                    NMR Up/down steps w/ inner unit                   NMR Water Exer Instruction                   NMR Hip Abd w/o piriformis                   NMR Hip Add w/o iliop/ham                    NMR Lower abs in supine                   NMR Abd obliques in supine                   NMR Prone Knee Flexion                   NMR Prone glut vern                   NMR Retro Step                    NMR Retro Walk                   NMR Retro walk on treadmill                   NMR Forward walk w/ inner unit                   NMR Balance Instruction                   NMR Stability Ball A/P & Lateral                   NMR Ball Catch/Throw /Supine                   NMR Ball Catch/Throw /Stand                   NMR StabilityBall All 4's Arm Lift                   NMR StabilityBall Prone Walk Out                   NMR StabilityBall Supine Oblique                   NMR Stability Ball sit-supine-sit                   NMR Walking Prog Progression                   MNR Home program sequencing                                       TA Hamstring stretch                   TA Hip Ext Rot Stretch                   TA Hip Int Rot Stretch                   TA Hip Flex/IT Stretch                   TA Hip Add Stretch                   TA Lats Dorsi Stretch                   TA Gastroc/soleus stretch                   TA QuadratusLumborm Stretch                      Supine                      Stance                   TA Quad Stretch                                       NMR Wall Push Ups                   NMR Planking                   NMR BOSU Ball Exercises                   NMR Lateral Bridge Drop                   NMR [place]                   NMR O'Feldt Lat Pull Down                   NMR O'Feldt Hip Ext                   NMR O'Feldt Trunk Ext                                       TA CervDiscExtSup/stnd                   TA Cerv Stretches                   TA Self PIC Cervical Spine                   TA Neural Gliding                   TA Ant/Mid Scalene Strch                   TA Biceps stretch                   TA Rotator Cuff Stretch                   TA Anterior Chest Stretch                   TA Wrist Ext Stretch                                       NMR Prone Arm Lifts                   NMR Scapula Stabilization                   NMR Occulomotor Isometrics                   NMR Cervical Isometrics                                        TA Shld AROM w/bar                   TA Shld Capsular Stretching                   TA Self PIC Thor Spine                   TA Rot Cuff Therabd, beginner                   TA Rot Cuff Therabd, intermed                                                                                                                                                                                                                                                 Evelyn Velez, PT, MS  Physical Therapist  [place]# [id number]

## 2018-04-17 ENCOUNTER — TREATMENT (OUTPATIENT)
Dept: PHYSICAL THERAPY | Facility: CLINIC | Age: 58
End: 2018-04-17

## 2018-04-17 DIAGNOSIS — M53.3 SACROILIAC JOINT DYSFUNCTION: Primary | ICD-10-CM

## 2018-04-17 DIAGNOSIS — R29.3 POSTURE IMBALANCE: ICD-10-CM

## 2018-04-17 PROCEDURE — 97140 MANUAL THERAPY 1/> REGIONS: CPT | Performed by: PHYSICAL THERAPIST

## 2018-04-17 PROCEDURE — 97112 NEUROMUSCULAR REEDUCATION: CPT | Performed by: PHYSICAL THERAPIST

## 2018-04-17 NOTE — PROGRESS NOTES
"Physical Therapy Note      SUBJECTIVE:   Changes since last seen:  \"I'm feeling rough.\"  Pain in right buttock, SI joint is ramping back up since not able to be seen last week secondary to insurance.   Huma had an independent medical exam yesterday.  She is back on light duty at work, but its painful, she's in the office answering phones, up and down a lot.  She alternates between standing and sitting.  She's done the self corrections 2-3x/day and they do give some relief when does them.   Sleeping has been worse the last 4-5 days, the egg crate made the thoracic spine feel worse but better in right buttock. She continues to spasm in left quad and foot.  Using Theraworx Relief, but not sure helping.   She likes the lumbar support; the SI belt is still helping, using it at home more.   Had an xray of ankle, because was tingling and had splaying of toes and was told its not broken.    Current level of pain:    5/10  Lowest level of pain since last seen:  4/10  Highest level of pain since last seen:  7-8/10 last night, by afternoon after activity    Past Medical History:  1.  DDD (degenerative disc disease), lumbar  2.  Cervical spondylosis without myelopathy  3.  Cervicalgia  4.  Left rotator cuff repair May 2012;  Left rotator cuff repair with biceps, Aug 2012;  Right rotator cuff repair, 2009  5.  Infantile idiopathic scoliosis of lumbosacral region  6.  Thoracic spondylosis without myelopathy  7.  Chronic midline low back pain without sciatica  8.  Acute midline low back pain without sciatica  9.  Status post lumbar spinal fusion, L1-S1, 04/04/17  10.  Pain in thoracic spine  11.  idiopathic scoliosis, thoracolumbar region  12.  Chronic SI joint pain  13.  Graves Disease  14.  Allergies to grass  15.  Left inguinal hernia repair, 2011  16.  Bilateral septoplasty, 2014   17.  Hx of elevated bp secondary to pain  18.  Left distal tibia/fibula fx, 1980  19.  Left ankle sprain, 2 months ago, currently wearing air " cast   20.  Hx of 2 normal vaginal deliveries and 1 , 29,27 and 25 years ago  21.  Last menses,     Patient Goals for Physical Therapy:  To get home exercise program for improved pelvic positioning and to decrease SI joint pain.        OBJECTIVE:     2018               SI Joint Positioning  Right  Left Right  Left Right  Left Right  Left Right  Left Right  Left Right  Left Right  Left Right  Left Right  Left Right  Left Right  Left Right  Left Right  Left Right  Left       Innominate                         Anterior   x   x   x                       Posterior               x              x               x                       Sacrum                            Unilateral rotation   x  x               x                                    SI Joint Testing  Right  Left Right  Left Right  Left Right  Left Right  Left Right  Left Right  Left Right  Left Right  Left Right  Left Right  Left Right  Left Right  Left Right  Left Right  Left           Distraction    +           +   +        +   +           +                       Lashell's    +       slight   +        -   +          -                       Compression     -         -   -        -                        Prone Press Up   No change   -        -                                  Special Tests  Right   Left Right  Left Right  Left Right  Left Right  Left Right  Left Right Left  Right  Left Right  Left Right  Left Right  Left Right  Left Right  Left Right Left  Right  Left      Straight Leg Raise                                  Active    -         -                           Passive   -          -                          Hip Scour Test   sl        -                                                                 Palpation:      Muscle/Bone Irritation  Date 2018                Right  Left Right  Left Right  Left Right  Left Right  Left Right  Left Right  Left Right  Left Right  Left Right  Left Right  Left  "Right  Left Right  Left Right  Left Right  Left   Piriformis  ++        +   +         + +         sl               Gr. Trochanter  +        sl   +        sl   Sl       sl               IT Band  -        -   -          -                Quadratus Lumborum  sl        sl   sl      sl   -          -               Ischial Tuberosity  -        -   -            -                Sacrococcygeal Ligs  +      slight Not tested   -          -               Paraspinals  +     sl   +                   Spinous Processes                                     T-spine rotated to   -           -                        L-spine rotated to  L2-5     -  L1-5 L3-5                Adductor Aden  -          ++  sl          +   sl        +                  Iliopsoas  slight   ++  sl          +    +        sl               Quad Origin slight slight    -         -                SI Joint  ++        -    +        sl    +        +               Pubic Symphysis           ++ Not tested                                  Rectus Diastasis 2 1/2finger                                   Leg Length:  The left lower extremity is 1/4\" shorter than the right    Monthly Objective Measurement/Functional Activity  Date: 03/27/2018          Oswestry Pain Score 60/100                                AROM Lumbar Spine: Degrees   Degrees      Degrees      Degrees      Degrees      Degrees      Degrees    Degrees      Flexion 120 pain right                        Extension 35 pain right                        Right Lat. Flexion 45                       Left Lat. Flexion 33                       Right Lat Shift Pelvis  no change                       Left Lat Shift Pelvis Inc                                AROM Hips:  (degrees) Right      Left Right Left Right  Left Right  Left Right  Left Right  Left Right  Left Right  Left      Flexion 125      120                       Abduction 38       35                       Int. Rotation 26       20                       Ext. " Rotation  40       49                               Left AROM Ankle (degrees) Pre Rx  Post (as of 04/17/18)            dorsiflexion 4          12                     plantarflexion       62            inversion      30            eversion      40                     Flexibility:   (degrees) Right    Left Right  Left Right  Left Right  Left Right  Left Right  Left Right  Left Right  Left      Hamstrings -10       -19                      Quadriceps 39       32                      HipExt/Rot(piriformis) 39       46             Hip Int/Rotators 23       26             Hip Flexors (iliopsoas) Not tested                     IT Band Not tested                              Reflexes: Right      Left Right  Left Right  Left Right  Left Right  Left Right  Left Right  Left Right  Left      L4 (Quad) -       -             S1 (Achilles) -       -                     Root Level  - Motor Right      Left Right  Left Right  Left Right  Left Right  Left Right  Left Right  Left Right  Left     T12             Rectus Abdominus 4+/5             L1              Paraspinals 5/5           5/5             L2              Hip Flexion 5/5           5/5             L3             Quads Not tested             L4              Anterior Tibialis 5/5          5/5             L5             Gr. Toe Extension 5/5          5/5             S1            Gastroc/Sol/Peroneals 5/5        4+/5                     Functional Strength:             Single LegStanceTime (seconds) R:6 inc   L:1    R:      L: R:      L: R:     L: R:      L: R:      L: R:      L: R:      L:     Pelvic Floor Isolation (seconds) -             Inner Unit  Isolation (seconds) -                     Functional Activities:              Sit w/o pain? Pain increases (minutes) Yes/ 30 min             Stand w/o pain? Yes/ 30 min             Walk w/o pain? No/ 15 min             Steps w/o pain? No/ 1 fl             Drive w/o pain? No/ 15-30 min             Work w/o pain? No/ on light duty              # times wakes w/ pain? Only sleeps 1 hr at a time, then wakes with pain            PLAN OF CARE:    ASSESSMENT:  Problem List:   1. SI joint dysfunction  2. Lack of spinal stabilization  3. Postural dysfunction     Discussion:    Huma will do more SI joint self corrections throughout the day to maximize ability of ligaments to heal.    Huma did well with today's instruction.  She has good understanding of biomechanics of sit to stand and stand to sit with inner unit on.  She is engaging the entire inner unit well.  Did take measurements of left ankle today as it is impacting her gait.  Performed joint mobs to the left foot/ankle and was able to increase dorsiflexion 8 degrees.   By the end of today's visit her pain was decreased in gait/stance.     rt Term Goals: (4-6 weeks)                               Date Met:                1.   pt independent in postural correction     04/05/18  2.   pt independent in SI joint self-corrections    04/05/18  3.   pt independent in exer to decrease post. disc pressures  04/17/18  4.   pt able to sleep through night without pain  5.   pt able to sit 40 minutes without pain  6.   pt able to walk 20 minutes without pain  7.   Reduce pt pain to no greater than 3/10   8.   Decrease Oswestry Pain Score to no greater than 50/100    Long Term Goals:(3-5 months)      Date Met:      1.  pt independent in self-management of symptoms       2.  pt independent in home program      3.  pt able to work 6 hours without pain      4.  pt able to sit 60 minutes without pain      5.  pt able to walk 45 min without pain    Treatment Plan:   1.  Ultrasound to increase extensibility, decrease pain, if needed  2.  Electrical stimulation for muscle relaxation, decrease pain, if needed, iontophoresis  3.  Manual therapy to increase function, decrease pain  4.  Therapeutic exercise to increase function, decrease pain  5.  Home programming and d/c planning to increase function and decrease  pain    Frequency & Duration:  Pt will be seen on a once/week basis for 10 more visits    Patient Participated in and Agrees With This Plan of Care and Goals:  YES  Rehab Potential:  Fair to good      Evelyn Velez, PT, MS  Physical Therapist  [place]# [id number]      TREATMENT TODAY:    Total Treatment Time: (time in clinic)  60 min  Timed Code Treatment Minutes:   60      Supplies given:     Manual Therapy:  (MA)  RX min:  35  Manual posterior rotation of right innominate    Positional Isometric contraction (PIC) of left iliopsoas    Positional Isometric contraction of (PIC) gluteus minimus    Distraction of both SI jts in open pack hip position  Piriformis stretching, bilaterally    Quadratus lumborum stretching, bilaterally    Anterior/Inferior Mobilization of  right hip    Positional Isometric Contraction of multifidus  Iliopsoas release  Iliopsoas stretching  Myofascial work, trunk  Joint mobilization left ankle/foot    Therapeutic Activities:  (TA)  RX min:     Neuromuscular Reeducation: (NMR)  RX min:  25    Ultrasound:  RX min:         1.6 vogel/cm2       Location:  Left quad    Iontophoresis:  6 hr patch                                Location:                               Procedures:      Key:  i=instructed        r=review        c=corrected        a=adapted   Code Procedure/Instruction 03/27/2018 04/05/18 04/17/18                TA         Sleeping Positions instructed                                TA Sternum-Up Posture instructed                  TA SI jt. self-correction instructed reviewed                 TA Lumbar Facet PIC                   TA   Order of Self-Corrections instructed reviewed                 TA Use of lumbar pillow instructed                  TA Use of cervical roll instructed                  TA Use of orthotics instructed                  TA Use of SI belt instructed                  TA Use of Nada chair                   TA Use of Ice                   TA Use of [employer]/ heat                    TA Use of TENS unit                   TA Use of iontophoresis                   TA Decreasing Disc Pressures  instructed                                     NMR Pelvic Floor Isolation  instructed reviewed                NMR Transverse Abdominus  instructed reviewed                NMR Multifidus Isolation  instructed reviewed                NMR InnerUnit against Gravity   instructed                NMR Sit-stand w/ inner unit   instructed                NMR Roll w/ inner unit   instructed                NMR Walk w/ inner unit    instructed                NMR Up/down steps w/ inner unit   instructed                NMR Water Exer Instruction                   NMR Hip Abd w/o piriformis                   NMR Hip Add w/o iliop/ham                    NMR Lower abs in supine                   NMR Abd obliques in supine                   NMR Prone Knee Flexion                   NMR Prone glut vern                   NMR Retro Step                   NMR Retro Walk                   NMR Retro walk on treadmill                   NMR Forward walk w/ inner unit                   NMR Balance Instruction                   NMR Stability Ball A/P & Lateral                   NMR Ball Catch/Throw /Supine                   NMR Ball Catch/Throw /Stand                   NMR StabilityBall All 4's Arm Lift                   NMR StabilityBall Prone Walk Out                   NMR StabilityBall Supine Oblique                   NMR Stability Ball sit-supine-sit                   NMR Walking Prog Progression                   MNR Home program sequencing                                       TA Hamstring stretch                   TA Hip Ext Rot Stretch                   TA Hip Int Rot Stretch                   TA Hip Flex/IT Stretch                   TA Hip Add Stretch                   TA Lats Dorsi Stretch                   TA Gastroc/soleus stretch                   TA QuadratusLumborm Stretch                      Supine                       Stance                   TA Quad Stretch                                       NMR Wall Push Ups                   NMR Planking                   NMR BOSU Ball Exercises                   NMR Lateral Bridge Drop                   NMR Waiters [place]                   NMR O'Feldt Lat Pull Down                   NMR O'Feldt Hip Ext                   NMR O'Feldt Trunk Ext                                       TA CervDiscExtSup/stnd                   TA Cerv Stretches                   TA Self PIC Cervical Spine                   TA Neural Gliding                   TA Ant/Mid Scalene Strch                   TA Biceps stretch                   TA Rotator Cuff Stretch                   TA Anterior Chest Stretch                   TA Wrist Ext Stretch                                       NMR Prone Arm Lifts                   NMR Scapula Stabilization                   NMR Occulomotor Isometrics                   NMR Cervical Isometrics                                       TA Shld AROM w/bar                   TA Shld Capsular Stretching                   TA Self PIC Thor Spine                   TA Rot Cuff Therabd, beginner                   TA Rot Cuff Therabd, intermed                                                                                                                                                                                                                                                 Evelyn Velez, PT, MS  Physical Therapist  [place]# [id number]

## 2018-04-19 ENCOUNTER — TREATMENT (OUTPATIENT)
Dept: PHYSICAL THERAPY | Facility: CLINIC | Age: 58
End: 2018-04-19

## 2018-04-19 DIAGNOSIS — R29.3 POSTURE IMBALANCE: ICD-10-CM

## 2018-04-19 DIAGNOSIS — M53.3 SACROILIAC JOINT DYSFUNCTION: Primary | ICD-10-CM

## 2018-04-19 PROCEDURE — 97112 NEUROMUSCULAR REEDUCATION: CPT | Performed by: PHYSICAL THERAPIST

## 2018-04-19 PROCEDURE — 97140 MANUAL THERAPY 1/> REGIONS: CPT | Performed by: PHYSICAL THERAPIST

## 2018-04-19 NOTE — PROGRESS NOTES
Physical Therapy Note      SUBJECTIVE:   Changes since last seen:  Pt saw Dr Ochoa yesterday; he's giving her 30 days to decide if she wants to have SI joint surgery.   She had to take tramadol this morning as the pain was bad.  She is self correcting The SI joint positioning 5x/day, at least 2x at work, and does get relief when she does it.   She slept 2 1/2 hrs last night which is a big improvement for her, up from sleeping only 1 hr at a time.   The foot and ankle joint mobs on last visit seemed to help make her gait a little easier.     Current level of pain:    4/10  Lowest level of pain since last seen:  3/10 right after her physical therapy visit on Tues.  Highest level of pain since last seen:  7-8/10  By end of day on Tues.     Past Medical History:  1.  DDD (degenerative disc disease), lumbar  2.  Cervical spondylosis without myelopathy  3.  Cervicalgia  4.  Left rotator cuff repair May 2012;  Left rotator cuff repair with biceps, Aug 2012;  Right rotator cuff repair,   5.  Infantile idiopathic scoliosis of lumbosacral region  6.  Thoracic spondylosis without myelopathy  7.  Chronic midline low back pain without sciatica  8.  Acute midline low back pain without sciatica  9.  Status post lumbar spinal fusion, L1-S1, 17  10.  Pain in thoracic spine  11.  idiopathic scoliosis, thoracolumbar region  12.  Chronic SI joint pain  13.  Graves Disease  14.  Allergies to grass  15.  Left inguinal hernia repair,   16.  Bilateral septoplasty,    17.  Hx of elevated bp secondary to pain  18.  Left distal tibia/fibula fx,   19.  Left ankle sprain, 2 months ago, currently wearing air cast   20.  Hx of 2 normal vaginal deliveries and 1 , 29,27 and 25 years ago  21.  Last menses,     Patient Goals for Physical Therapy:  To get home exercise program for improved pelvic positioning and to decrease SI joint pain.        OBJECTIVE:     2018              SI  Joint Positioning  Right  Left Right  Left Right  Left Right  Left Right  Left Right  Left Right  Left Right  Left Right  Left Right  Left Right  Left Right  Left Right  Left Right  Left Right  Left       Innominate                         Anterior   x   x   x    x                     Posterior               x              x               x               x                 Sacrum                            Unilateral rotation   x  x               x    x                                   SI Joint Testing  Right  Left Right  Left Right  Left Right  Left Right  Left Right  Left Right  Left Right  Left Right  Left Right  Left Right  Left Right  Left Right  Left Right  Left Right  Left           Distraction    +           +   +        +   +           +   +          +                      Lashell's    +       slight   +        -   +          -   -          -                      Compression     -         -   -        -                        Prone Press Up   No change   -        -                                  Special Tests  Right   Left Right  Left Right  Left Right  Left Right  Left Right  Left Right Left  Right  Left Right  Left Right  Left Right  Left Right  Left Right  Left Right Left  Right  Left      Straight Leg Raise                                  Active    -         -                           Passive   -          -                          Hip Scour Test   sl        -                                                                 Palpation:      Muscle/Bone Irritation  Date 03/27/2018 04/05/18 04/17/18 044/19/18               Right  Left Right  Left Right  Left Right  Left Right  Left Right  Left Right  Left Right  Left Right  Left Right  Left Right  Left Right  Left Right  Left Right  Left Right  Left   Piriformis  ++        +   +         + +         sl   +          +              Gr. Trochanter  +        sl   +        sl   Sl       sl   Sl          sl              IT Band  -        -   -          -    -     "       -              Quadratus Lumborum  sl        sl   sl      sl   -          -   -           -              Ischial Tuberosity  -        -   -            -    -            -              Sacrococcygeal Ligs  +      slight Not tested   -          - Not tested              Paraspinals  +     sl   +       +               Spinous Processes                                     T-spine rotated to   -           -                        L-spine rotated to  L2-5     -  L1-5 L3-5  L2-5              Adductor Aden  -          ++  sl          +   sl        +     +         +              Iliopsoas  slight   ++  sl          +    +        sl  +         +              Quad Origin slight slight    -         -    -        -              SI Joint  ++        -    +        sl    +        +   ++       +              Pubic Symphysis           ++ Not tested                                  Rectus Diastasis 2 1/2finger                                   Leg Length:  The left lower extremity is 1/4\" shorter than the right    Monthly Objective Measurement/Functional Activity  Date: 03/27/2018          Oswestry Pain Score 60/100                                AROM Lumbar Spine: Degrees   Degrees      Degrees      Degrees      Degrees      Degrees      Degrees    Degrees      Flexion 120 pain right                        Extension 35 pain right                        Right Lat. Flexion 45                       Left Lat. Flexion 33                       Right Lat Shift Pelvis  no change                       Left Lat Shift Pelvis Inc                                AROM Hips:  (degrees) Right      Left Right Left Right  Left Right  Left Right  Left Right  Left Right  Left Right  Left      Flexion 125      120                       Abduction 38       35                       Int. Rotation 26       20                       Ext. Rotation  40       49                               Left AROM Ankle (degrees) Pre Rx  Post (as of 04/17/18)            " dorsiflexion 4          12                     plantarflexion       62            inversion      30            eversion      40                     Flexibility:   (degrees) Right    Left Right  Left Right  Left Right  Left Right  Left Right  Left Right  Left Right  Left      Hamstrings -10       -19                      Quadriceps 39       32                      HipExt/Rot(piriformis) 39       46             Hip Int/Rotators 23       26             Hip Flexors (iliopsoas) Not tested                     IT Band Not tested                              Reflexes: Right      Left Right  Left Right  Left Right  Left Right  Left Right  Left Right  Left Right  Left      L4 (Quad) -       -             S1 (Achilles) -       -                     Root Level  - Motor Right      Left Right  Left Right  Left Right  Left Right  Left Right  Left Right  Left Right  Left     T12             Rectus Abdominus 4+/5             L1              Paraspinals 5/5           5/5             L2              Hip Flexion 5/5           5/5             L3             Quads Not tested             L4              Anterior Tibialis 5/5          5/5             L5             Gr. Toe Extension 5/5          5/5             S1            Gastroc/Sol/Peroneals 5/5        4+/5                     Functional Strength:             Single LegStanceTime (seconds) R:6 inc   L:1    R:      L: R:      L: R:     L: R:      L: R:      L: R:      L: R:      L:     Pelvic Floor Isolation (seconds) -             Inner Unit  Isolation (seconds) -                     Functional Activities:              Sit w/o pain? Pain increases (minutes) Yes/ 30 min             Stand w/o pain? Yes/ 30 min             Walk w/o pain? No/ 15 min             Steps w/o pain? No/ 1 fl             Drive w/o pain? No/ 15-30 min             Work w/o pain? No/ on light duty             # times wakes w/ pain? Only sleeps 1 hr at a time, then wakes with pain            PLAN OF  CARE:    ASSESSMENT:  Problem List:   1. SI joint dysfunction  2. Lack of spinal stabilization  3. Postural dysfunction     Discussion:    The left quad still spasms in supine, even at rest, after performing PIC to left iliopsoas.  Distraction was able to inhibit this, however, this is still a problem for Huma at home.  When she is intentional about using the inner unit to stabilize the trunk the spasming may decrease. She did well with the hip abduction, able to tell when she isolated the glut med appropriately and when she substituted with the piriformis. She had to be intentional about engaging the glut medius or she would use the piriformis.  She also did well understanding hip add using adductors but she tends to use the iliopsoas and the medial hamstring inappropriately on this.   Pain significantly decreased by end of visit.      Short Term Goals: (4-6 weeks)                               Date Met:                1.   pt independent in postural correction     04/05/18  2.   pt independent in SI joint self-corrections    04/05/18  3.   pt independent in exer to decrease post. disc pressures  04/17/18  4.   pt able to sleep through night without pain  5.   pt able to sit 40 minutes without pain  6.   pt able to walk 20 minutes without pain  7.   Reduce pt pain to no greater than 3/10   8.   Decrease Oswestry Pain Score to no greater than 50/100  9.  Pt able to isolate the inner unit without gravity    04/19/18  10.  Pt able to engage the inner unit against gravity and hold 10 sec 04/19/18  11.  Pt able to perform hip abd without piriformis x6  12.  Pt able to perform hip add without engaging iliopsoas x6    Long Term Goals:(3-5 months)      Date Met:      1.  pt independent in self-management of symptoms       2.  pt independent in home program      3.  pt able to work 6 hours without pain      4.  pt able to sit 60 minutes without pain      5.  pt able to walk 45 min without pain    Treatment Plan:   1.   Ultrasound to increase extensibility, decrease pain, if needed  2.  Electrical stimulation for muscle relaxation, decrease pain, if needed, iontophoresis  3.  Manual therapy to increase function, decrease pain  4.  Therapeutic exercise to increase function, decrease pain  5.  Home programming and d/c planning to increase function and decrease pain    Frequency & Duration:  Pt will be seen on a once/week basis for 9 more visits    Patient Participated in and Agrees With This Plan of Care and Goals:  YES  Rehab Potential:  Fair to good      Evelyn Velez, PT, MS  Physical Therapist  [place]# [id number]      TREATMENT TODAY:    Total Treatment Time: (time in clinic)    60      min  Timed Code Treatment Minutes:   60      Supplies given:     Manual Therapy:  (MA)  RX min:  30  Manual posterior rotation of right innominate    Positional Isometric contraction (PIC) of left iliopsoas    Positional Isometric contraction of (PIC) gluteus minimus    Distraction of both SI jts in open pack hip position  Piriformis stretching, bilaterally    Quadratus lumborum stretching, bilaterally    Anterior/Inferior Mobilization of  right hip    Positional Isometric Contraction of multifidus  Iliopsoas release  Iliopsoas stretching  Myofascial work, trunk  Manual axial distraction    Therapeutic Activities:  (TA)  RX min:     Neuromuscular Reeducation: (NMR)  RX min:   30    Ultrasound:  RX min:            1.6 vogel/cm2                            Location:     Iontophoresis:  6 hr patch                                Location:                               Procedures:      Key:  i=instructed        r=review        c=corrected        a=adapted   Code Procedure/Instruction 03/27/2018 04/05/18 04/17/18 04/19/18               TA         Sleeping Positions instructed                                TA Sternum-Up Posture instructed                  TA SI jt. self-correction instructed reviewed  reviewed               TA Lumbar Facet PIC                    TA   Order of Self-Corrections instructed reviewed                 TA Use of lumbar pillow instructed                  TA Use of cervical roll instructed                  TA Use of orthotics instructed                  TA Use of SI belt instructed                  TA Use of Nada chair                   TA Use of Ice                   TA Use of [employer]/ heat                   TA Use of TENS unit                   TA Use of iontophoresis                   TA Decreasing Disc Pressures  instructed                                     NMR Pelvic Floor Isolation  instructed reviewed reviewed               NMR Transverse Abdominus  instructed reviewed reviewed               NMR Multifidus Isolation  instructed reviewed reviewed               NMR InnerUnit against Gravity   instructed reviewed               NMR Sit-stand w/ inner unit   instructed                NMR Roll w/ inner unit   instructed                NMR Walk w/ inner unit    instructed                NMR Up/down steps w/ inner unit   instructed                NMR Water Exer Instruction                   NMR Hip Abd w/o piriformis    instructed               NMR Hip Add w/o iliop/ham     instructed               NMR Lower abs in supine                   NMR Abd obliques in supine                   NMR Prone Knee Flexion                   NMR Prone glut vern                   NMR Retro Step                   NMR Retro Walk                   NMR Retro walk on treadmill                   NMR Forward walk w/ inner unit                   NMR Balance Instruction                   NMR Stability Ball A/P & Lateral                   NMR Ball Catch/Throw /Supine                   NMR Ball Catch/Throw /Stand                   NMR StabilityBall All 4's Arm Lift                   NMR StabilityBall Prone Walk Out                   NMR StabilityBall Supine Oblique                   NMR Stability Ball sit-supine-sit                   NMR Walking Prog Progression                    MNR Home program sequencing                                       TA Hamstring stretch                   TA Hip Ext Rot Stretch                   TA Hip Int Rot Stretch                   TA Hip Flex/IT Stretch                   TA Hip Add Stretch                   TA Lats Dorsi Stretch                   TA Gastroc/soleus stretch                   TA QuadratusLumborm Stretch                      Supine                      Stance                   TA Quad Stretch                                       NMR Wall Push Ups                   NMR Planking                   NMR BOSU Ball Exercises                   NMR Lateral Bridge Drop                   NMR [place]                   NMR O'Feldt Lat Pull Down                   NMR O'Feldt Hip Ext                   NMR O'Feldt Trunk Ext                                       TA CervDiscExtSup/stnd                   TA Cerv Stretches                   TA Self PIC Cervical Spine                   TA Neural Gliding                   TA Ant/Mid Scalene Strch                   TA Biceps stretch                   TA Rotator Cuff Stretch                   TA Anterior Chest Stretch                   TA Wrist Ext Stretch                                       NMR Prone Arm Lifts                   NMR Scapula Stabilization                   NMR Occulomotor Isometrics                   NMR Cervical Isometrics                                       TA Shld AROM w/bar                   TA Shld Capsular Stretching                   TA Self PIC Thor Spine                   TA Rot Cuff Therabd, beginner                   TA Rot Cuff Therabd, intermed                                                                                                                                                                                                                                                 Evelyn Velez, PT, MS  Physical Therapist  [place]# [id number]

## 2018-04-23 ENCOUNTER — TELEPHONE (OUTPATIENT)
Dept: NEUROSURGERY | Facility: CLINIC | Age: 58
End: 2018-04-23

## 2018-04-23 NOTE — TELEPHONE ENCOUNTER
"Provider:  John  Caller: Huma Montano  Time of call:   12:38 PM  Phone #:   [phone]  Surgery:  L5-S1 OSTEOTOMY; L2-L3,L3-L4 AND L5-S1 INTERBODY FUSIONS; L2 TO S1 POSTERIOR FUSION WITH PEDICLE SCREWS AND BONE GRAFT  Surgery Date:  04/04/17  Last visit:   02/20/18  Next visit: 05/21/18    Reason for call:   ----- Message from Huma Montano sent at 4/23/2018 12:38 PM EDT -----  Regarding: Non-Urgent Medical Question  Contact: [phone]    Dr. Lopez,  I'm still being seen by a worker's comp MD for back pain due to a fall in 09-17.  His POC is to fuse my right SI joint. I'm emailing you for guidance on this due to concerns I have.     My #1 concern is, am I even a good candidate for this surgery due to the state of my sacrum?  It was stated in my surgery report, when you successfully fused my L2-S1, that \"There were multiple small pinholes in otherwise very thin sacrum\". I'm concerned my sacrum may not have the integrity needed for this procedure. Would you be willing to give me your opinion?     I have a f/u visit with you on May 21st but I have to address this by May 17th.  I'm overwhelmed. Searching for a 2nd opinion & didn't know if I should ask you or you prefer another MD.Thank y    "

## 2018-04-24 ENCOUNTER — TREATMENT (OUTPATIENT)
Dept: PHYSICAL THERAPY | Facility: CLINIC | Age: 58
End: 2018-04-24

## 2018-04-24 DIAGNOSIS — R29.3 POSTURE IMBALANCE: ICD-10-CM

## 2018-04-24 DIAGNOSIS — M53.3 SACROILIAC JOINT DYSFUNCTION: Primary | ICD-10-CM

## 2018-04-24 PROCEDURE — 97140 MANUAL THERAPY 1/> REGIONS: CPT | Performed by: PHYSICAL THERAPIST

## 2018-04-24 PROCEDURE — 97112 NEUROMUSCULAR REEDUCATION: CPT | Performed by: PHYSICAL THERAPIST

## 2018-04-24 NOTE — PROGRESS NOTES
"Physical Therapy Note      SUBJECTIVE:   Changes since last seen:  Pt reports she did well last Thursday after last visit until next day and seemed like the self corrections made it worse; she didn't try the other side, just stopped doing corrections and finally calmed down by Sunday.    Was \"OK\" yesterday at work.  She slept 4 hrs without waking last night, which is a new record for her since her fall, but she woke more tired than she typically has been. She is going back to allergist today to check on sinus.   She did work on the 2 new neuro-motor re-training exercises; she feels like she did well, but notes she really had to concentrate to do them correctly.    She continues to complain of the left ankle pain which she can tell is having an impact on her gait.   She notes she continues to have random spasming of the left quad, which she is not able to correlate with any particular movement or position.     Current level of pain:    6/10  Lowest level of pain since last seen:  3/10 right after her physical therapy visit on Thurs, \"it was wonderful.  Highest level of pain since last seen:  7-8/10  By end of Saturday      Past Medical History:  1.  DDD (degenerative disc disease), lumbar  2.  Cervical spondylosis without myelopathy  3.  Cervicalgia  4.  Left rotator cuff repair May 2012;  Left rotator cuff repair with biceps, Aug 2012;  Right rotator cuff repair, 2009  5.  Infantile idiopathic scoliosis of lumbosacral region  6.  Thoracic spondylosis without myelopathy  7.  Chronic midline low back pain without sciatica  8.  Acute midline low back pain without sciatica  9.  Status post lumbar spinal fusion, L1-S1, 04/04/17  10.  Pain in thoracic spine  11.  idiopathic scoliosis, thoracolumbar region  12.  Chronic SI joint pain  13.  Graves Disease  14.  Allergies to grass  15.  Left inguinal hernia repair, 2011  16.  Bilateral septoplasty, 2014   17.  Hx of elevated bp secondary to pain  18.  Left distal " tibia/fibula fx,   19.  Left ankle sprain, 2 months ago, currently wearing air cast   20.  Hx of 2 normal vaginal deliveries and 1 , 29,27 and 25 years ago  21.  Last menses,     Patient Goals for Physical Therapy:  To get home exercise program for improved pelvic positioning and to decrease SI joint pain.        OBJECTIVE:     2018             SI Joint Positioning  Right  Left Right  Left Right  Left Right  Left Right  Left Right  Left Right  Left Right  Left Right  Left Right  Left Right  Left Right  Left Right  Left Right  Left Right  Left       Innominate                         Anterior   x   x   x    x  x                    Posterior               x              x               x               x               x                Sacrum                            Unilateral rotation   x  x               x    x   x                                  SI Joint Testing  Right  Left Right  Left Right  Left Right  Left Right  Left Right  Left Right  Left Right  Left Right  Left Right  Left Right  Left Right  Left Right  Left Right  Left Right  Left           Distraction    +           +   +        +   +           +   +          +     +         +                     Lashell's    +       slight   +        -   +          -   -          -    -         -                     Compression     -         -   -        -                        Prone Press Up   No change   -        -                                  Special Tests  Right   Left Right  Left Right  Left Right  Left Right  Left Right  Left Right Left  Right  Left Right  Left Right  Left Right  Left Right  Left Right  Left Right Left  Right  Left      Straight Leg Raise                                  Active    -         -                           Passive   -          -                          Hip Scour Test   sl        -                                                                  Palpation:      Muscle/Bone  "Irritation  Date 03/27/2018 04/05/18 04/17/18 044/19/18 04/24/18              Right  Left Right  Left Right  Left Right  Left Right  Left Right  Left Right  Left Right  Left Right  Left Right  Left Right  Left Right  Left Right  Left Right  Left Right  Left   Piriformis  ++        +   +         + +         sl   +          +   +            +             Gr. Trochanter  +        sl   +        sl   Sl       sl   Sl          sl   Sl        Sl              IT Band  -        -   -          -    -           -   Sl        Sl              Quadratus Lumborum  sl        sl   sl      sl   -          -   -           -   Sl        sl             Ischial Tuberosity  -        -   -            -    -            -   -            -             Sacrococcygeal Ligs  +      slight Not tested   -          - Not tested   Sl         -             Paraspinals  +     sl   +       +    Sl         -             Spinous Processes                                     T-spine rotated to   -           -                        L-spine rotated to  L2-5     -  L1-5 L3-5  L2-5  L2-5             Adductor Aden  -          ++  sl          +   sl        +     +         +   +       sl             Iliopsoas  slight   ++  sl          +    +        sl  +         +   +          +             Quad Origin slight slight    -         -    -        -   -           -             SI Joint  ++        -    +        sl    +        +   ++       +   +         +             Pubic Symphysis           ++ Not tested                                  Rectus Diastasis 2 1/2finger                                   Leg Length:  The left lower extremity is 1/4\" shorter than the right    Monthly Objective Measurement/Functional Activity  Date: 03/27/2018          Oswestry Pain Score 60/100                                AROM Lumbar Spine: Degrees   Degrees      Degrees      Degrees      Degrees      Degrees      Degrees    Degrees      Flexion 120 pain right                        " Extension 35 pain right                        Right Lat. Flexion 45                       Left Lat. Flexion 33                       Right Lat Shift Pelvis  no change                       Left Lat Shift Pelvis Inc                                AROM Hips:  (degrees) Right      Left Right Left Right  Left Right  Left Right  Left Right  Left Right  Left Right  Left      Flexion 125      120                       Abduction 38       35                       Int. Rotation 26       20                       Ext. Rotation  40       49                               Left AROM Ankle (degrees) Pre Rx  Post (as of 04/17/18)            dorsiflexion 4          12                     plantarflexion       62            inversion      30            eversion      40                     Flexibility:   (degrees) Right    Left Right  Left Right  Left Right  Left Right  Left Right  Left Right  Left Right  Left      Hamstrings -10       -19                      Quadriceps 39       32                      HipExt/Rot(piriformis) 39       46             Hip Int/Rotators 23       26             Hip Flexors (iliopsoas) Not tested                     IT Band Not tested                              Reflexes: Right      Left Right  Left Right  Left Right  Left Right  Left Right  Left Right  Left Right  Left      L4 (Quad) -       -             S1 (Achilles) -       -                     Root Level  - Motor Right      Left Right  Left Right  Left Right  Left Right  Left Right  Left Right  Left Right  Left     T12             Rectus Abdominus 4+/5             L1              Paraspinals 5/5           5/5             L2              Hip Flexion 5/5           5/5             L3             Quads Not tested             L4              Anterior Tibialis 5/5          5/5             L5             Gr. Toe Extension 5/5          5/5             S1            Gastroc/Sol/Peroneals 5/5        4+/5                     Functional Strength:              Single LegStanceTime (seconds) R:6 inc   L:1    R:      L: R:      L: R:     L: R:      L: R:      L: R:      L: R:      L:     Pelvic Floor Isolation (seconds) -             Inner Unit  Isolation (seconds) -                     Functional Activities:              Sit w/o pain? Pain increases (minutes) Yes/ 30 min             Stand w/o pain? Yes/ 30 min             Walk w/o pain? No/ 15 min             Steps w/o pain? No/ 1 fl             Drive w/o pain? No/ 15-30 min             Work w/o pain? No/ on light duty             # times wakes w/ pain? Only sleeps 1 hr at a time, then wakes with pain            PLAN OF CARE:    ASSESSMENT:  Problem List:   1. SI joint dysfunction  2. Lack of spinal stabilization  3. Postural dysfunction     Discussion:    Huma appears to have a cyst-like structure over the right SI joint which is extremely painful to light touch.  Will keep an eye on this.  Pt will be intentional about icing over the right SI joint over the next couple of days.   She did very well with the neuro-motor re-training exercises for the glut medius and the hip adductors!  Very good execution, only correction needed is to slow down on the hip abduction.  Progressed to teaching the lower abs and bent knee fall out for the obliques.  Huma had a good understanding of how to do each of these but it was very hard for her not to use her upper quarter as a substitute for the inner unit on both.  She also has a tendency to hold her breath as a substitute for the inner unit.  She had cog wheel movement on both the lower abs and the bent knee fall out.  She will be intentional on doing both of these exercises before the next visit.     Joint mobilization of the left ankle allowed for full range of motion and less pulling out of neutral on plantar flexion.  If her ankle does not improve over the next week, she will get an ortho assessment for the right ankle.       Short Term Goals: (4-6 weeks)                                  Date Met:                1.   pt independent in postural correction       04/05/18  2.   pt independent in SI joint self-corrections      04/05/18  3.   pt independent in exer to decrease post. disc pressures    04/17/18  4.   pt able to sleep through night without pain  5.   pt able to sit 40 minutes without pain  6.   pt able to walk 20 minutes without pain  7.   Reduce pt pain to no greater than 3/10   8.   Decrease Oswestry Pain Score to no greater than 50/100  9.  Pt able to isolate the inner unit without gravity      04/19/18  10.  Pt able to engage the inner unit against gravity and hold 10 sec   04/19/18  11.  Pt able to perform hip abd without piriformis x6     04/24/18  12.  Pt able to perform hip add without engaging iliopsoas x6    04/24/18  13.  Pt able to perform lower abs x6 without pelvic motion  14.  Pt able to perform bent knee fall out x6 without engaging the piriformis       Long Term Goals:(3-5 months)        Date Met:      1.  pt independent in self-management of symptoms       2.  pt independent in home program      3.  pt able to work 6 hours without pain      4.  pt able to sit 60 minutes without pain      5.  pt able to walk 45 min without pain    Treatment Plan:   1.  Ultrasound to increase extensibility, decrease pain, if needed  2.  Electrical stimulation for muscle relaxation, decrease pain, if needed, iontophoresis  3.  Manual therapy to increase function, decrease pain  4.  Therapeutic exercise to increase function, decrease pain  5.  Home programming and d/c planning to increase function and decrease pain    Frequency & Duration:  Pt will be seen on a once/week basis for 8 more visits    Patient Participated in and Agrees With This Plan of Care and Goals:  YES  Rehab Potential:  Fair to good      Evelyn Velez, PT, MS  Physical Therapist  [place]# [id number]      TREATMENT TODAY:    Total Treatment Time: (time in clinic)    60  min  Timed Code Treatment Minutes:   60      Supplies  given:     Manual Therapy:  (MA)  RX min:  35  Manual posterior rotation of right innominate    Positional Isometric contraction (PIC) of left iliopsoas    Positional Isometric contraction of (PIC) gluteus minimus    Distraction of both SI jts in open pack hip position  Piriformis stretching, bilaterally    Quadratus lumborum stretching, bilaterally    Anterior/Inferior Mobilization of  right hip    Positional Isometric Contraction of multifidus  Iliopsoas release  Iliopsoas stretching  Myofascial work, trunk  Manual axial distraction    Therapeutic Activities:  (TA)  RX min:     Neuromuscular Reeducation: (NMR)  RX min:   25    Ultrasound:  RX min:            1.6 vogel/cm2                            Location:     Iontophoresis:  6 hr patch                                Location:                               Procedures:      Key:  i=instructed        r=review        c=corrected        a=adapted   Code Procedure/Instruction 03/27/2018 04/05/18 04/17/18 04/19/18 04/24/18              TA         Sleeping Positions instructed                                TA Sternum-Up Posture instructed                  TA SI jt. self-correction instructed reviewed  reviewed               TA Lumbar Facet PIC                   TA   Order of Self-Corrections instructed reviewed                 TA Use of lumbar pillow instructed                  TA Use of cervical roll instructed                  TA Use of orthotics instructed                  TA Use of SI belt instructed                  TA Use of Nada chair                   TA Use of Ice                   TA Use of [employer]/ heat                   TA Use of TENS unit                   TA Use of iontophoresis                   TA Decreasing Disc Pressures  instructed                                     NMR Pelvic Floor Isolation  instructed reviewed reviewed               NMR Transverse Abdominus  instructed reviewed reviewed               NMR Multifidus Isolation  instructed  reviewed reviewed               NMR InnerUnit against Gravity   instructed reviewed               NMR Sit-stand w/ inner unit   instructed                NMR Roll w/ inner unit   instructed                NMR Walk w/ inner unit    instructed                NMR Up/down steps w/ inner unit   instructed                NMR Water Exer Instruction                   NMR Hip Abd w/o piriformis    instructed reviewed              NMR Hip Add w/o iliop/ham     instructed redeview              NMR Lower abs in supine     instructed              NMR Abd obliques in supine     instructed              NMR Prone Knee Flexion                   NMR Prone glut vern                   NMR Retro Step                   NMR Retro Walk                   NMR Retro walk on treadmill                   NMR Forward walk w/ inner unit                   NMR Balance Instruction                   NMR Stability Ball A/P & Lateral                   NMR Ball Catch/Throw /Supine                   NMR Ball Catch/Throw /Stand                   NMR StabilityBall All 4's Arm Lift                   NMR StabilityBall Prone Walk Out                   NMR StabilityBall Supine Oblique                   NMR Stability Ball sit-supine-sit                   NMR Walking Prog Progression                   MNR Home program sequencing                                       TA Hamstring stretch                   TA Hip Ext Rot Stretch                   TA Hip Int Rot Stretch                   TA Hip Flex/IT Stretch                   TA Hip Add Stretch                   TA Lats Dorsi Stretch                   TA Gastroc/soleus stretch                   TA QuadratusLumborm Stretch                      Supine                      Stance                   TA Quad Stretch                                       NMR Wall Push Ups                   NMR Planking                   NMR BOSU Ball Exercises                   NMR Lateral Bridge Drop                   NMR [place]                    NMR O'Feldt Lat Pull Down                   NMR O'Feldt Hip Ext                   NMR O'Feldt Trunk Ext                                       TA CervDiscExtSup/stnd                   TA Cerv Stretches                   TA Self PIC Cervical Spine                   TA Neural Gliding                   TA Ant/Mid Scalene Strch                   TA Biceps stretch                   TA Rotator Cuff Stretch                   TA Anterior Chest Stretch                   TA Wrist Ext Stretch                                       NMR Prone Arm Lifts                   NMR Scapula Stabilization                   NMR Occulomotor Isometrics                   NMR Cervical Isometrics                                       TA Shld AROM w/bar                   TA Shld Capsular Stretching                   TA Self PIC Thor Spine                   TA Rot Cuff Therabd, beginner                   TA Rot Cuff Therabd, intermed                                                                                                                                                                                                                                                 Evelyn Velez, PT, MS  Physical Therapist  [place]# [id number]

## 2018-04-26 ENCOUNTER — TREATMENT (OUTPATIENT)
Dept: PHYSICAL THERAPY | Facility: CLINIC | Age: 58
End: 2018-04-26

## 2018-04-26 DIAGNOSIS — M53.3 SACROILIAC JOINT DYSFUNCTION: Primary | ICD-10-CM

## 2018-04-26 DIAGNOSIS — R29.3 POSTURE IMBALANCE: ICD-10-CM

## 2018-04-26 PROCEDURE — 97530 THERAPEUTIC ACTIVITIES: CPT | Performed by: PHYSICAL THERAPIST

## 2018-04-26 PROCEDURE — 97112 NEUROMUSCULAR REEDUCATION: CPT | Performed by: PHYSICAL THERAPIST

## 2018-04-26 PROCEDURE — 97140 MANUAL THERAPY 1/> REGIONS: CPT | Performed by: PHYSICAL THERAPIST

## 2018-04-26 NOTE — PROGRESS NOTES
"Physical Therapy Note      SUBJECTIVE:   Changes since last seen:  \"I had the best day yesterday!\"  \"I was able to go home and make supper and then felt really sleepy; Fell asleep at 9 and woke up at midnight with pain.\"   \"Today has been horrible, I might have over done the exercises.\"   Huma did all the exercises given to her on last visit but she really had to concentrate to execute the neuromotor exercises properly.   Huma reports she had to self correct on the left side today and was able to get some relief.   She was weaning herself off her muscle relaxers.  But she realizes she can't come off them completely yet.    \"Yesterday, the left ankle was better and felt more stable, but today its cramping again.\"  \"The left quad spasming is decreasing!!\"      Current level of pain:    6/10  Lowest level of pain since last seen:  3/10  yesterday   Highest level of pain since last seen:  8/10  Last night    Past Medical History:  1.  DDD (degenerative disc disease), lumbar  2.  Cervical spondylosis without myelopathy  3.  Cervicalgia  4.  Left rotator cuff repair May 2012;  Left rotator cuff repair with biceps, Aug 2012;  Right rotator cuff repair,   5.  Infantile idiopathic scoliosis of lumbosacral region  6.  Thoracic spondylosis without myelopathy  7.  Chronic midline low back pain without sciatica  8.  Acute midline low back pain without sciatica  9.  Status post lumbar spinal fusion, L1-S1, 17  10.  Pain in thoracic spine  11.  idiopathic scoliosis, thoracolumbar region  12.  Chronic SI joint pain  13.  Graves Disease  14.  Allergies to grass  15.  Left inguinal hernia repair,   16.  Bilateral septoplasty,    17.  Hx of elevated bp secondary to pain  18.  Left distal tibia/fibula fx,   19.  Left ankle sprain, 2 months ago, currently wearing air cast   20.  Hx of 2 normal vaginal deliveries and 1 , 29,27 and 25 years ago  21.  Last menses,     Patient Goals for Physical " Therapy:  To get home exercise program for improved pelvic positioning and to decrease SI joint pain.        OBJECTIVE:     03/27/2018 04/05/18 04/17/18 04/19/18 04/24/18 04/26/18            SI Joint Positioning  Right  Left Right  Left Right  Left Right  Left Right  Left Right  Left Right  Left Right  Left Right  Left Right  Left Right  Left Right  Left Right  Left Right  Left Right  Left       Innominate                         Anterior   x   x   x    x  x  x                   Posterior               x              x               x               x               x             x               Sacrum                             Unilateral rotation   x  x               x    x   x   x                                 SI Joint Testing  Right  Left Right  Left Right  Left Right  Left Right  Left Right  Left Right  Left Right  Left Right  Left Right  Left Right  Left Right  Left Right  Left Right  Left Right  Left           Distraction    +           +   +        +   +           +   +          +     +         +   +           +                    Lashell's    +       slight   +        -   +          -   -          -    -         -   -          -                    Compression     -         -   -        -                        Prone Press Up   No change   -        -                                  Special Tests  Right   Left Right  Left Right  Left Right  Left Right  Left Right  Left Right Left  Right  Left Right  Left Right  Left Right  Left Right  Left Right  Left Right Left  Right  Left      Straight Leg Raise                                  Active    -         -                           Passive   -          -                          Hip Scour Test   sl        -                                                                  Palpation:      Muscle/Bone Irritation  Date 03/27/2018 04/05/18 04/17/18 044/19/18 04/24/18 04/26/18             Right  Left Right  Left Right  Left Right  Left Right  Left Right  Left Right   "Left Right  Left Right  Left Right  Left Right  Left Right  Left Right  Left Right  Left Right  Left   Piriformis  ++        +   +         + +         sl   +          +   +            +   +        -            Gr. Trochanter  +        sl   +        sl   Sl       sl   Sl          sl   Sl        Sl    -           -            IT Band  -        -   -          -    -           -   Sl        Sl   sl            -            Quadratus Lumborum  sl        sl   sl      sl   -          -   -           -   Sl        sl  ++        -            Ischial Tuberosity  -        -   -            -    -            -   -            -   -           -            Sacrococcygeal Ligs  +      slight Not tested   -          - Not tested   Sl         -   -         sl            Paraspinals  +     sl   +       +    Sl         -  -           -            Spinous Processes                                     T-spine rotated to   -           -                        L-spine rotated to  L2-5     -  L1-5 L3-5  L2-5  L2-5  L2-5            Adductor Aden  -          ++  sl          +   sl        +     +         +   +       sl             Iliopsoas  slight   ++  sl          +    +        sl  +         +   +          +             Quad Origin slight slight    -         -    -        -   -           -             SI Joint  Cyst like structure over right SI joint  ++        -    +        sl    +        +   ++       +   +         +      +  +            +      +            Pubic Symphysis           ++ Not tested                                  Rectus Diastasis 2 1/2finger                                   Leg Length:  The left lower extremity is 1/4\" shorter than the right    Monthly Objective Measurement/Functional Activity  Date: 03/27/2018 04/26/18         Oswestry Pain Score 60/100    51/100                               AROM Lumbar Spine: Degrees   Degrees      Degrees      Degrees      Degrees      Degrees      Degrees    Degrees      Flexion 120 " pain right        124              Extension 35 pain right         39            Right Lat. Flexion 45        43             Left Lat. Flexion 33        33            Right Lat Shift Pelvis  no change No change             Left Lat Shift Pelvis Inc pain   Inc pain right SI joint                    AROM Hips:  (degrees) Right      Left Right Left Right  Left Right  Left Right  Left Right  Left Right  Left Right  Left      Flexion 125      120  125    125                  Abduction 38       35    45     42              Int. Rotation 26       20   40      35              Ext. Rotation  40       49   45      49                       Left AROM Ankle (degrees) Pre Rx  Post (as of 04/17/18)           Post           dorsiflexion 4          12                      12           plantarflexion       62             65           inversion      30             35           eversion      40             40                    Flexibility:   (degrees) Right    Left Right  Left Right  Left Right  Left Right  Left Right  Left Right  Left Right  Left      Hamstrings -10       -19  -15      -45            Quadriceps 39       32   60     30            HipExt/Rot(piriformis) 39       46  42       45            Hip Int/Rotators 23       26   25      28            Hip Flexors (iliopsoas) Not tested         Not tested            IT Band Not tested          Not tested                    Reflexes: Right      Left Right  Left Right  Left Right  Left Right  Left Right  Left Right  Left Right  Left      L4 (Quad) -       -             S1 (Achilles) -       -                     Root Level  - Motor Right      Left Right  Left Right  Left Right  Left Right  Left Right  Left Right  Left Right  Left     T12             Rectus Abdominus 4+/5 4+/5 to 5/5            L1              Paraspinals 5/5           5/5 5/5        5/5            L2              Hip Flexion 5/5           5/5 5/5        5/5            L3             Quads Not tested  5/5       5/5             L4              Anterior Tibialis 5/5          5/5 5/5       5/5            L5             Gr. Toe Extension 5/5          5/5 5/5       5/5            S1            Gastroc/Sol/Peroneals 5/5        4+/5 5/5    pain in left ankle                    Functional Strength:             Single LegStanceTime (seconds) R:6 inc   L:1    R:      L: R:      L: R:     L: R:      L: R:      L: R:      L: R:      L:     Pelvic Floor Isolation (seconds) -             Inner Unit  Isolation (seconds) -                     Functional Activities:              Sit w/o pain? Pain increases (minutes) Yes/ 30 min    Yes/ 35-40            Stand w/o pain? Yes/ 30 min Yes/ 45            Walk w/o pain? No/ 15 min Yes/10 min            Steps w/o pain? No/ 1 fl Yes/ 1 fl            Drive w/o pain? No/ 15-30 min Yes/5-10            Work w/o pain? No/ on light duty Yes/ 35-40            # times wakes w/ pain? Only sleeps 1 hr at a time, then wakes with pain Wakes every 3 hrs.            PLAN OF CARE:    ASSESSMENT:  Problem List:   1. SI joint dysfunction  2. Lack of spinal stabilization  3. Postural dysfunction     Discussion:    I am very encouraged that Huma had the best day yet, yesterday! But then it appears she over did the outer unit exercises yesterday and thus was more painful in the right QL today. Huma is being diligent with her home program.  She needed a few corrections on all previous exercises.  I did go ahead and instruct her in hip adductor stretching and supine hamstring stretching.  She has good body awareness and is doing well with the neuromotor re-training exercises.  The left ankle range has improved, correlating with less pain in function over the last 2 days.  The cyst like structure persists over the right SI joint.  She had difficulty with the new prone knee flexion neuro motor exercise on the left; able to flex to 60 degrees on the right without the pelvis moving but could only flex to 35 degrees on the  left when her pelvis began to move.     Short Term Goals: (4-6 weeks)                                 Date Met:                1.   pt independent in postural correction       04/05/18  2.   pt independent in SI joint self-corrections      04/05/18  3.   pt independent in exer to decrease post. disc pressures    04/17/18  4.   pt able to sleep through night without pain  5.   pt able to sit 40 minutes without pain  6.   pt able to walk 20 minutes without pain  7.   Reduce pt pain to no greater than 3/10   8.   Decrease Oswestry Pain Score to no greater than 50/100  9.  Pt able to isolate the inner unit without gravity      04/19/18  10.  Pt able to engage the inner unit against gravity and hold 10 sec   04/19/18  11.  Pt able to perform hip abd without piriformis x6     04/24/18  12.  Pt able to perform hip add without engaging iliopsoas x6    04/24/18  13.  Pt able to perform lower abs x6 without pelvic motion    04/26/18  14.  Pt able to perform bent knee fall out x6 without engaging the piriformis  15.  Pt able to doprone knee flexion to 45 degrees, bilaterally, w/o pelvic motion, x6     Long Term Goals:(3-5 months)        Date Met:      1.  pt independent in self-management of symptoms       2.  pt independent in home program      3.  pt able to work 6 hours without pain      4.  pt able to sit 60 minutes without pain      5.  pt able to walk 45 min without pain    Progress Towards Goals:  As expected to slightly slower than expected    Treatment Plan:   1.  Ultrasound to increase extensibility, decrease pain, if needed  2.  Electrical stimulation for muscle relaxation, decrease pain, if needed, iontophoresis  3.  Manual therapy to increase function, decrease pain  4.  Therapeutic exercise to increase function, decrease pain  5.  Home programming and d/c planning to increase function and decrease pain    Frequency & Duration:  Pt will be seen on a once/week basis for 7 more visits    Patient Participated in and  Agrees With This Plan of Care and Goals:  YES  Rehab Potential:  Fair to good      Evelyn Velez, PT, MS  Physical Therapist  [place]# [id number]      TREATMENT TODAY:    Total Treatment Time: (time in clinic)      70  min  Timed Code Treatment Minutes:   70      Supplies given:     Manual Therapy:  (MA)  RX min:  35  Manual posterior rotation of right innominate    Positional Isometric contraction (PIC) of left iliopsoas    Positional Isometric contraction of (PIC) gluteus minimus    Distraction of both SI jts in open pack hip position  Piriformis stretching, bilaterally    Quadratus lumborum stretching, bilaterally    Anterior/Inferior Mobilization of  right hip    Positional Isometric Contraction of multifidus  Iliopsoas release  Iliopsoas stretching  Myofascial work, trunk  Manual axial distraction  Mobilization of left foot/ankle    Therapeutic Activities:  (TA)  RX min:   15    Neuromuscular Reeducation: (NMR)  RX min:   20    Ultrasound:  RX min:            1.6 vogel/cm2                            Location:     Iontophoresis:  6 hr patch                                Location:                               Procedures:      Key:  i=instructed        r=review        c=corrected        a=adapted   Code Procedure/Instruction 03/27/2018 04/05/18 04/17/18 04/19/18 04/24/18 04/26/18             TA         Sleeping Positions instructed                                TA Sternum-Up Posture instructed                  TA SI jt. self-correction instructed reviewed  reviewed               TA Lumbar Facet PIC                   TA   Order of Self-Corrections instructed reviewed                 TA Use of lumbar pillow instructed                  TA Use of cervical roll instructed                  TA Use of orthotics instructed                  TA Use of SI belt instructed                  TA Use of Nada chair                   TA Use of Ice                   TA Use of [employer]/ heat                   TA Use of TENS unit                    TA Use of iontophoresis                   TA Decreasing Disc Pressures  instructed                                     NMR Pelvic Floor Isolation  instructed reviewed reviewed               NMR Transverse Abdominus  instructed reviewed reviewed               NMR Multifidus Isolation  instructed reviewed reviewed               NMR InnerUnit against Gravity   instructed reviewed               NMR Sit-stand w/ inner unit   instructed                NMR Roll w/ inner unit   instructed                NMR Walk w/ inner unit    instructed                NMR Up/down steps w/ inner unit   instructed                NMR Water Exer Instruction                   NMR Hip Abd w/o piriformis    instructed reviewed              NMR Hip Add w/o iliop/ham     instructed redeview              NMR Lower abs in supine     instructed corrected             NMR Abd obliques in supine     instructed corrected             NMR Prone Knee Flexion      instructed             NMR Prone glut vern                   NMR Retro Step                   NMR Retro Walk                   NMR Retro walk on treadmill                   NMR Forward walk w/ inner unit                   NMR Balance Instruction                   NMR Stability Ball A/P & Lateral                   NMR Ball Catch/Throw /Supine                   NMR Ball Catch/Throw /Stand                   NMR StabilityBall All 4's Arm Lift                   NMR StabilityBall Prone Walk Out                   NMR StabilityBall Supine Oblique                   NMR Stability Ball sit-supine-sit                   NMR Walking Prog Progression                   MNR Home program sequencing                                       TA Hamstring stretch      instructed             TA Hip Ext Rot Stretch                   TA Hip Int Rot Stretch                   TA Hip Flex/IT Stretch                   TA Hip Add Stretch      instructed             TA Lats Dorsi Stretch                   TA  Gastroc/soleus stretch                   TA QuadratusLumborm Stretch                      Supine                      Stance                   TA Quad Stretch                                       NMR Wall Push Ups                   NMR Planking                   NMR BOSU Ball Exercises                   NMR Lateral Bridge Drop                   NMR [place]                   NMR O'Feldt Lat Pull Down                   NMR O'Feldt Hip Ext                   NMR O'Feldt Trunk Ext                                       TA CervDiscExtSup/stnd                   TA Cerv Stretches                   TA Self PIC Cervical Spine                   TA Neural Gliding                   TA Ant/Mid Scalene Strch                   TA Biceps stretch                   TA Rotator Cuff Stretch                   TA Anterior Chest Stretch                   TA Wrist Ext Stretch                                       NMR Prone Arm Lifts                   NMR Scapula Stabilization                   NMR Occulomotor Isometrics                   NMR Cervical Isometrics                                       TA Shld AROM w/bar                   TA Shld Capsular Stretching                   TA Self PIC Thor Spine                   TA Rot Cuff Therabd, beginner                   TA Rot Cuff Therabd, intermed                                                                                                                                                                                                                                                 Evelyn Velez, PT, MS  Physical Therapist  [place]# [id number]

## 2018-05-01 ENCOUNTER — TREATMENT (OUTPATIENT)
Dept: PHYSICAL THERAPY | Facility: CLINIC | Age: 58
End: 2018-05-01

## 2018-05-01 DIAGNOSIS — R29.3 POSTURE IMBALANCE: ICD-10-CM

## 2018-05-01 DIAGNOSIS — M53.3 SACROILIAC JOINT DYSFUNCTION: Primary | ICD-10-CM

## 2018-05-01 PROCEDURE — 97112 NEUROMUSCULAR REEDUCATION: CPT | Performed by: PHYSICAL THERAPIST

## 2018-05-01 PROCEDURE — 97140 MANUAL THERAPY 1/> REGIONS: CPT | Performed by: PHYSICAL THERAPIST

## 2018-05-01 NOTE — PROGRESS NOTES
"Physical Therapy Note      SUBJECTIVE:   Changes since last seen:  Huma reports that Thursday and Friday after last visit were her 2 best days in her office since she has fallen.  She didn't do a whole lot on Sat and Sunday because she had felt so good and wanted to enjoy it.   Friday and Sat slept 2-4 hrs without waking up which was an improvement.   Left quad spasming is not as frequent and not as long in duration when it occurs.     She went grocery shopping and did fine while in store, but had increased pain that night.   Almost getting \"stupid tired\" now because haven't been able to sleep the last 2 nights.  She had massage last night which loosened her a little, but she notes it did not lessen her pain.     She is having to remind her self to relax in both sitting and standing.   Yesterday at work, she felt she had a good gait pattern.     She's till struggling with the lower abs exercises, but good with bent knee fall out exercise.   When does prone knee flexion still not bending knee more than 30-40 degrees, but she is focusing on inner unit.    Current level of pain:    5-6/10  Lowest level of pain since last seen:  2/10  Friday    Highest level of pain since last seen:  7/10  Levi night after grocery shopping    Past Medical History:  1.  DDD (degenerative disc disease), lumbar  2.  Cervical spondylosis without myelopathy  3.  Cervicalgia  4.  Left rotator cuff repair May 2012;  Left rotator cuff repair with biceps, Aug 2012;  Right rotator cuff repair, 2009  5.  Infantile idiopathic scoliosis of lumbosacral region  6.  Thoracic spondylosis without myelopathy  7.  Chronic midline low back pain without sciatica  8.  Acute midline low back pain without sciatica  9.  Status post lumbar spinal fusion, L1-S1, 04/04/17  10.  Pain in thoracic spine  11.  idiopathic scoliosis, thoracolumbar region  12.  Chronic SI joint pain  13.  Graves Disease  14.  Allergies to grass  15.  Left inguinal hernia repair, "   16.  Bilateral septoplasty,    17.  Hx of elevated bp secondary to pain  18.  Left distal tibia/fibula fx,   19.  Left ankle sprain, 2 months ago, currently wearing air cast   20.  Hx of 2 normal vaginal deliveries and 1 , 29,27 and 25 years ago  21.  Last menses,     Patient Goals for Physical Therapy:  To get home exercise program for improved pelvic positioning and to decrease SI joint pain.        OBJECTIVE:     2018           SI Joint Positioning  Right  Left Right  Left Right  Left Right  Left Right  Left Right  Left Right  Left Right  Left Right  Left Right  Left Right  Left Right  Left Right  Left Right  Left Right  Left       Innominate                         Anterior   x   x   x    x  x  x  x                  Posterior               x              x               x               x               x             x               x              Sacrum                             Unilateral rotation   x  x               x    x   x   x               x                                SI Joint Testing  Right  Left Right  Left Right  Left Right  Left Right  Left Right  Left Right  Left Right  Left Right  Left Right  Left Right  Left Right  Left Right  Left Right  Left Right  Left           Distraction    +           +   +        +   +           +   +          +     +         +   +           +   +           +                   Lashell's    +       slight   +        -   +          -   -          -    -         -   -          -                    Compression     -         -   -        -                        Prone Press Up   No change   -        -                                  Special Tests  Right   Left Right  Left Right  Left Right  Left Right  Left Right  Left Right Left  Right  Left Right  Left Right  Left Right  Left Right  Left Right  Left Right Left  Right  Left      Straight Leg Raise                                  Active     -         -                           Passive   -          -                          Hip Scour Test   sl        -                                                                  Palpation:      Muscle/Bone Irritation  Date 03/27/2018 04/05/18 04/17/18 044/19/18 04/24/18 04/26/18 05/01/18            Right  Left Right  Left Right  Left Right  Left Right  Left Right  Left Right  Left Right  Left Right  Left Right  Left Right  Left Right  Left Right  Left Right  Left Right  Left   Piriformis  ++        +   +         + +         sl   +          +   +            +   +        -  +          sl           Gr. Trochanter  +        sl   +        sl   Sl       sl   Sl          sl   Sl        Sl    -           -  -           -           IT Band  -        -   -          -    -           -   Sl        Sl   sl            -  sl         -           Quadratus Lumborum  sl        sl   sl      sl   -          -   -           -   Sl        sl  ++        -  ++         +           Ischial Tuberosity  -        -   -            -    -            -   -            -   -           -   -           -           Sacrococcygeal Ligs  +      slight Not tested   -          - Not tested   Sl         -   -         sl   -            -           Paraspinals  +     sl   +       +    Sl         -  -           -  +           -           Spinous Processes                                     T-spine rotated to   -           -                        L-spine rotated to  L2-5     -  L1-5 L3-5  L2-5  L2-5  L2-5 Sl L2-5           Adductor Aden  -          ++  sl          +   sl        +     +         +   +       sl   +        sl           Iliopsoas  slight   ++  sl          +    +        sl  +         +   +          +   +       +           Quad Origin slight slight    -         -    -        -   -           -    -         -           SI Joint  Cyst like structure over right SI joint  ++        -    +        sl    +        +   ++       +   +         +      +  +  "           +      +   +       sl           Pubic Symphysis           ++ Not tested                                  Rectus Diastasis 2 1/2finger                                   Leg Length:  The left lower extremity is 1/4\" shorter than the right    Monthly Objective Measurement/Functional Activity  Date: 03/27/2018 04/26/18         Oswestry Pain Score 60/100    51/100                               AROM Lumbar Spine: Degrees   Degrees      Degrees      Degrees      Degrees      Degrees      Degrees    Degrees      Flexion 120 pain right        124              Extension 35 pain right         39            Right Lat. Flexion 45        43             Left Lat. Flexion 33        33            Right Lat Shift Pelvis  no change No change             Left Lat Shift Pelvis Inc pain   Inc pain right SI joint                    AROM Hips:  (degrees) Right      Left Right Left Right  Left Right  Left Right  Left Right  Left Right  Left Right  Left      Flexion 125      120  125    125                  Abduction 38       35    45     42              Int. Rotation 26       20   40      35              Ext. Rotation  40       49   45      49                       Left AROM Ankle (degrees) Pre Rx  Post (as of 04/17/18)           Post           dorsiflexion 4          12                      12           plantarflexion       62             65           inversion      30             35           eversion      40             40                    Flexibility:   (degrees) Right    Left Right  Left Right  Left Right  Left Right  Left Right  Left Right  Left Right  Left      Hamstrings -10       -19  -15      -45            Quadriceps 39       32   60     30            HipExt/Rot(piriformis) 39       46  42       45            Hip Int/Rotators 23       26   25      28            Hip Flexors (iliopsoas) Not tested         Not tested            IT Band Not tested          Not tested                    Reflexes: Right      Left Right  " "Left Right  Left Right  Left Right  Left Right  Left Right  Left Right  Left      L4 (Quad) -       -             S1 (Achilles) -       -                     Root Level  - Motor Right      Left Right  Left Right  Left Right  Left Right  Left Right  Left Right  Left Right  Left     T12             Rectus Abdominus 4+/5 4+/5 to 5/5            L1              Paraspinals 5/5           5/5 5/5        5/5            L2              Hip Flexion 5/5           5/5 5/5        5/5            L3             Quads Not tested  5/5       5/5            L4              Anterior Tibialis 5/5          5/5 5/5       5/5            L5             Gr. Toe Extension 5/5          5/5 5/5       5/5            S1            Gastroc/Sol/Peroneals 5/5        4+/5 5/5    pain in left ankle                    Functional Strength:             Single LegStanceTime (seconds) R:6 inc   L:1    R:      L: R:      L: R:     L: R:      L: R:      L: R:      L: R:      L:     Pelvic Floor Isolation (seconds) -             Inner Unit  Isolation (seconds) -                     Functional Activities:              Sit w/o pain? Pain increases (minutes) Yes/ 30 min    Yes/ 35-40            Stand w/o pain? Yes/ 30 min Yes/ 45            Walk w/o pain? No/ 15 min Yes/10 min            Steps w/o pain? No/ 1 fl Yes/ 1 fl            Drive w/o pain? No/ 15-30 min Yes/5-10            Work w/o pain? No/ on light duty Yes/ 35-40            # times wakes w/ pain? Only sleeps 1 hr at a time, then wakes with pain Wakes every 3 hrs.            PLAN OF CARE:    ASSESSMENT:  Problem List:   1. SI joint dysfunction  2. Lack of spinal stabilization  3. Postural dysfunction     Discussion:    Continued improvement with Huma reporting her best day in the office last Thursday and Friday since her fall.    Grocery shopping is still too much strain on the SI ligaments.  She will utilize the \"click list\" shopping until she is able to tolerate standing on concrete and pushing a " grocery car.  She is doing very well with the outer unit exercises.  Progressed to glut max isolation in supine and retro stepping.    Began treadmill work with the inner unit going on and off.   She is not able to walk without an antalgic gait pattern without the brace on the left ankle.  She is not wearing the brace at home and she is working on increasing weight bearing on left ankle at counter.       Short Term Goals: (4-6 weeks)                                 Date Met:                1.   pt independent in postural correction       04/05/18  2.   pt independent in SI joint self-corrections      04/05/18  3.   pt independent in exer to decrease post. disc pressures    04/17/18  4.   pt able to sleep through night without pain  5.   pt able to sit 40 minutes without pain  6.   pt able to walk 20 minutes without pain  7.   Reduce pt pain to no greater than 3/10   8.   Decrease Oswestry Pain Score to no greater than 50/100  9.  Pt able to isolate the inner unit without gravity      04/19/18  10.  Pt able to engage the inner unit against gravity and hold 10 sec   04/19/18  11.  Pt able to perform hip abd without piriformis x6     04/24/18  12.  Pt able to perform hip add without engaging iliopsoas x6    04/24/18  13.  Pt able to perform lower abs x6 without pelvic motion    04/26/18  14.  Pt able to perform bent knee fall out x6 without engaging the piriformis  05/01/18  15.  Pt able to do prone knee flexion to 45 degrees, bilaterally, w/o pelvic motion, x6  16.  Pt able to bridge x6 w/ inner unit and no piriformis  17.  Pt able to retro step x6 without piriformis      Long Term Goals:(3-5 months)        Date Met:      1.  pt independent in self-management of symptoms       2.  pt independent in home program      3.  pt able to work 6 hours without pain      4.  pt able to sit 60 minutes without pain      5.  pt able to walk 45 min without pain    Progress Towards Goals:  As expected to slightly slower than  expected    Treatment Plan:   1.  Ultrasound to increase extensibility, decrease pain, if needed  2.  Electrical stimulation for muscle relaxation, decrease pain, if needed, iontophoresis  3.  Manual therapy to increase function, decrease pain  4.  Therapeutic exercise to increase function, decrease pain  5.  Home programming and d/c planning to increase function and decrease pain    Frequency & Duration:  Pt will be seen on a once/week basis for  6 more visits    Patient Participated in and Agrees With This Plan of Care and Goals:  YES  Rehab Potential:  Fair to good      Evelyn Velez, PT, MS  Physical Therapist  [place]# [id number]      TREATMENT TODAY:    Total Treatment Time: (time in clinic)     65  min  Timed Code Treatment Minutes:   65      Supplies given:     Manual Therapy:  (MA)  RX min:  25  Manual posterior rotation of right innominate    Positional Isometric contraction (PIC) of left iliopsoas    Positional Isometric contraction of (PIC) gluteus minimus    Distraction of both SI jts in open pack hip position  Piriformis stretching, bilaterally    Quadratus lumborum stretching, bilaterally    Anterior/Inferior Mobilization of  right hip    Positional Isometric Contraction of multifidus  Iliopsoas release  Iliopsoas stretching  Myofascial work, trunk  Manual axial distraction  Mobilization of left foot/ankle      Therapeutic Activities:  (TA)  RX min:   5    Neuromuscular Reeducation: (NMR)  RX min:   35    Ultrasound:  RX min:            1.6 vogel/cm2                            Location:     Iontophoresis:  6 hr patch                                Location:                               Procedures:      Key:  i=instructed        r=review        c=corrected        a=adapted   Code Procedure/Instruction 03/27/2018 04/05/18 04/17/18 04/19/18 04/24/18 04/26/18 05/01/18            TA         Sleeping Positions instructed                                TA Sternum-Up Posture instructed                  TA SI jt.  self-correction instructed reviewed  reviewed               TA Lumbar Facet PIC                   TA   Order of Self-Corrections instructed reviewed                 TA Use of lumbar pillow instructed                  TA Use of cervical roll instructed                  TA Use of orthotics instructed                  TA Use of SI belt instructed                  TA Use of Nada chair                   TA Use of Ice                   TA Use of [employer]/ heat                   TA Use of TENS unit                   TA Use of iontophoresis                   TA Decreasing Disc Pressures  instructed                 TA Toe raises at counter       instructed                                                                        NMR Pelvic Floor Isolation  instructed reviewed reviewed               NMR Transverse Abdominus  instructed reviewed reviewed               NMR Multifidus Isolation  instructed reviewed reviewed               NMR InnerUnit against Gravity   instructed reviewed               NMR Sit-stand w/ inner unit   instructed                NMR Roll w/ inner unit   instructed                NMR Walk w/ inner unit    instructed                NMR Up/down steps w/ inner unit   instructed                NMR Water Exer Instruction                   NMR Hip Abd w/o piriformis    instructed reviewed  reviewed            NMR Hip Add w/o iliop/ham     instructed redeview  reviewed            NMR Lower abs in supine     instructed corrected reviewed            NMR Abd obliques in supine     instructed corrected reviewed            NMR Prone Knee Flexion      instructed reviewed            NMR Prone glut vern                   NMR Retro Step       instructed            NMR Retro Walk       instructed            NMR Retro walk on treadmill       instructed            NMR Forward walk w/ inner unit                   NMR Balance Instruction                   NMR Stability Ball A/P & Lateral                   NMR Ball  Catch/Throw /Supine                   NMR Ball Catch/Throw /Stand                   NMR StabilityBall All 4's Arm Lift                   NMR StabilityBall Prone Walk Out                   NMR StabilityBall Supine Oblique                   NMR Stability Ball sit-supine-sit                   NMR Walking Prog Progression                   MNR Home program sequencing                                       TA Hamstring stretch      instructed             TA Hip Ext Rot Stretch                   TA Hip Int Rot Stretch                   TA Hip Flex/IT Stretch                   TA Hip Add Stretch      instructed             TA Lats Dorsi Stretch                   TA Gastroc/soleus stretch                   TA QuadratusLumborm Stretch                      Supine                      Stance                   TA Quad Stretch                                       NMR Wall Push Ups                   NMR Planking                   NMR BOSU Ball Exercises                   NMR Lateral Bridge Drop                   NMR [place]                   NMR O'Feldt Lat Pull Down                   NMR O'Feldt Hip Ext                   NMR O'Feldt Trunk Ext                                       TA CervDiscExtSup/stnd                   TA Cerv Stretches                   TA Self PIC Cervical Spine                   TA Neural Gliding                   TA Ant/Mid Scalene Strch                   TA Biceps stretch                   TA Rotator Cuff Stretch                   TA Anterior Chest Stretch                   TA Wrist Ext Stretch                                       NMR Prone Arm Lifts                   NMR Scapula Stabilization                   NMR Occulomotor Isometrics                   NMR Cervical Isometrics                                       TA Shld AROM w/bar                   TA Shld Capsular Stretching                   TA Self PIC Thor Spine                   TA Rot Cuff Therabd, beginner                   TA Rot Cuff  Tanner, blessing Velez, PT, MS  Physical Therapist  [place]# [id number]

## 2018-05-04 ENCOUNTER — OFFICE VISIT (OUTPATIENT)
Dept: NEUROSURGERY | Facility: CLINIC | Age: 58
End: 2018-05-04

## 2018-05-04 VITALS
SYSTOLIC BLOOD PRESSURE: 134 MMHG | HEIGHT: 66 IN | DIASTOLIC BLOOD PRESSURE: 88 MMHG | WEIGHT: 164 LBS | OXYGEN SATURATION: 99 % | BODY MASS INDEX: 26.36 KG/M2 | HEART RATE: 99 BPM | TEMPERATURE: 98.8 F

## 2018-05-04 DIAGNOSIS — G89.29 CHRONIC RIGHT SI JOINT PAIN: ICD-10-CM

## 2018-05-04 DIAGNOSIS — M41.25 OTHER IDIOPATHIC SCOLIOSIS, THORACOLUMBAR REGION: ICD-10-CM

## 2018-05-04 DIAGNOSIS — M47.814 THORACIC SPONDYLOSIS WITHOUT MYELOPATHY: ICD-10-CM

## 2018-05-04 DIAGNOSIS — M54.6 PAIN IN THORACIC SPINE: ICD-10-CM

## 2018-05-04 DIAGNOSIS — M54.50 CHRONIC MIDLINE LOW BACK PAIN WITHOUT SCIATICA: ICD-10-CM

## 2018-05-04 DIAGNOSIS — Z98.1 STATUS POST LUMBAR SPINAL FUSION: ICD-10-CM

## 2018-05-04 DIAGNOSIS — M53.3 CHRONIC RIGHT SI JOINT PAIN: ICD-10-CM
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
## 2018-05-04 DIAGNOSIS — G89.29 CHRONIC MIDLINE LOW BACK PAIN WITHOUT SCIATICA: ICD-10-CM

## 2018-05-04 PROCEDURE — 99213 OFFICE O/P EST LOW 20 MIN: CPT | Performed by: NEUROLOGICAL SURGERY

## 2018-05-04 NOTE — PROGRESS NOTES
Patient: Huma Montano  :  1960  Chart #:  [id number]    Date of Service: 18    Chief Complaint:   Chief Complaint   Patient presents with   • Back Pain       Back Pain   Chronicity: Mrs. Montano returns today for a follow-up on her back pain. Episode onset:   On 17 she had a left L2-L3 transforaminal lumbar interbody fusion with capstone cage and right L3-L4 transforaminal lumbar interbody fusion. Episode frequency: In 2018 she fell.  She has had increased pain since. Pain location: She is pleased with the results of her surgery. The quality of the pain is described as aching. Radiates to: Most of her pain today is in the lumbar region on the  right sided  The pain is at a severity of 4/10. The pain is mild (Her pain continues to be mild to moderate.). The pain is worse during the day (Her pain increases when she is ambulatory.). The symptoms are aggravated by bending and standing. Stiffness is present in the morning (She has great flexibility.). (She has had L2 to S1 posterior fusion with pedicle screw fixation.) Risk factors include lack of exercise and sedentary lifestyle (She is currently working part-time, light duty.  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 She has tried bed rest, heat, NSAIDs, muscle relaxant and analgesics (She has had 2 SI joint injections and she has had temporary pain relief.  She is doing PT which is helping.  she takes tramadol and tizanidine.) for the symptoms. The treatment provided moderate relief.       Radiographic Images:     X-ray of the lumbar spine dated 17 shows her sagittal alignment is normal.  There are no fractures noted.  She has a slight Left scoliotic curve.    Past Medical History:   Diagnosis Date   • Allergic    • Arthritis    • Back pain    • Graves disease    • Injury of back    • Kyphosis of cervical region    • Scoliosis    • Spinal stenosis    • Urinary tract infection    • Wears glasses      Current Outpatient  "Prescriptions   Medication Sig Dispense Refill   • amphetamine-dextroamphetamine XR (ADDERALL XR) 25 MG 24 hr capsule Take 25 mg by mouth every morning.     • aspirin 81 MG EC tablet Take 1 tablet by mouth daily. 30 tablet 0   • gabapentin (NEURONTIN) 600 MG tablet Take 1 tablet by mouth 3 (Three) Times a Day. 90 tablet 5   • levothyroxine (SYNTHROID, LEVOTHROID) 112 MCG tablet Take 112 mcg by mouth daily.     • lisinopril (PRINIVIL,ZESTRIL) 10 MG tablet Take 10 mg by mouth Daily.     • meloxicam (MOBIC) 15 MG tablet TAKE ONE TABLET BY MOUTH DAILY WITH FOOD 30 tablet 2   • Multiple Vitamins-Minerals (MULTIVITAL PO) Take 1 tablet by mouth Daily.     • ondansetron (ZOFRAN) 4 MG tablet Take 4 mg by mouth Every 4 (Four) Hours As Needed for Nausea or Vomiting (CAN TAKE EVERY 4-6 HRS PRN).     • tiZANidine (ZANAFLEX) 4 MG tablet Take 8 mg by mouth At Night As Needed for Muscle Spasms (CAN TAKE 2 TABS AT BEDTIME, EACH TAB 4MG).     • traMADol (ULTRAM) 50 MG tablet Take 1 tablet by mouth Every 6 (Six) Hours As Needed for Moderate Pain . 60 tablet 2   • traMADol ER (ULTRAM ER) 300 MG 24 hr tablet Take 1 tablet by mouth Daily. 60 tablet 0     No current facility-administered medications for this visit.       Allergies   Allergen Reactions   • Erythromycin Nausea And Vomiting and GI Intolerance     \"INTENSE STOMACH PAIN\"   • Lortab [Hydrocodone-Acetaminophen] Other (See Comments)     SKIN BREAKDOWN     Social History     Social History   • Marital status:      Social History Main Topics   • Smoking status: Never Smoker   • Smokeless tobacco: Never Used   • Alcohol use No   • Drug use: No   • Sexual activity: Defer     Other Topics Concern   • Not on file     Family History   Problem Relation Age of Onset   • No Known Problems Mother    • No Known Problems Father    • Breast cancer Neg Hx    • Ovarian cancer Neg Hx      Past Surgical History:   Procedure Laterality Date   • BREAST BIOPSY Right 2016   •  " "SECTION  1991   • CYST REMOVAL      Breast   • CYST REMOVAL      Cervical cyst   • HERNIA REPAIR     • LUMBAR LAMINECTOMY WITH FUSION N/A 4/4/2017    Procedure: L5-S1 OSTEOTOMY; L2-L3,L3-L4 AND L5-S1 INTERBODY FUSIONS; L2 TO S1 POSTERIOR FUSION WITH PEDICLE SCREWS AND BONE GRAFT;  Surgeon: Connor Lopez MD;  Location: [facility];  Service:    • OOPHORECTOMY  1993   • ROTATOR CUFF REPAIR      3x   • SEPTOPLASTY     • SPINAL FUSION  2010   • STEROID INJECTION      LUMBAR     Review of Systems   Musculoskeletal: Positive for back pain.   All other systems reviewed and are negative.    Vitals:    05/04/18 0750   BP: 134/88   BP Location: Right arm   Patient Position: Sitting   Pulse: 99   Temp: 98.8 °F (37.1 °C)   TempSrc: Temporal Artery    SpO2: 99%   Weight: 74.4 kg (164 lb)   Height: 168.9 cm (66.5\")     Physical Exam  Neurologic Exam  Constitutional: She is oriented to person, place, and time. Vital signs are normal. She appears well-developed and well-nourished. No distress.   Neat healthy female   Neck: Trachea normal and normal range of motion. No thyroid mass present.   Minimal neck stiffness; No Spurling's or L'Hermitte's signs; Shoulder ROM normal   Musculoskeletal:   Thoracic back: She exhibits pain. She exhibits no tenderness and no deformity.   Lumbar back: She exhibits pain. She exhibits normal range of motion, no deformity and no spasm.   Healed lumbar incision; Mild stiffness; SLR negative; Hip ROM normal    Tender to palpation of R buttock and ischial tuberosity  Tender trigger point over R SI joint.      Mental Status   Oriented to person, place, and time.   Attention: normal. Concentration: normal.   Speech: speech is normal   Level of consciousness: alert  Knowledge: good and consistent with education.   Normal comprehension.       Cranial Nerves   Cranial nerves II through XII intact.       Motor Exam   Muscle bulk: normal  Overall muscle tone: normal      Strength   Strength 5/5 throughout. "       Sensory Exam   Light touch normal.   Proprioception normal.       Gait, Coordination, and Reflexes       Gait: normal      Tremor   Resting tremor: absent  Intention tremor: absent  Action tremor: absent      Reflexes   Right biceps: 1+  Left biceps: 1+  Right triceps: 1+  Left triceps: 1+  Right patellar: 2+  Left patellar: 2+  Right achilles: 1+  Left achilles: 1+  Right Scherer: absent  Left Scherer: absent  Right ankle clonus: absent  Left ankle clonus: absent  LATASHA normal; Right handed       Huma was seen today for back pain.    Diagnoses and all orders for this visit:    Fall, subsequent encounter    Chronic midline low back pain without sciatica    Pain in thoracic spine    Status post lumbar spinal fusion    Thoracic spondylosis without myelopathy    Other idiopathic scoliosis, thoracolumbar region    Chronic right SI joint pain      Plan:  Continue PT and daily exercise;  Avoid abuse of the back;  I will see her again prn if she has more trouble with her back.      I, Dr. Connor Lopez, personally performed the services described in the documentation as scribed in my presence, and the documentation is both accurate and complete.    Connor Lopez MD

## 2018-05-08 ENCOUNTER — TREATMENT (OUTPATIENT)
Dept: PHYSICAL THERAPY | Facility: CLINIC | Age: 58
End: 2018-05-08

## 2018-05-08 DIAGNOSIS — R29.3 POSTURE IMBALANCE: ICD-10-CM

## 2018-05-08 DIAGNOSIS — M53.3 SACROILIAC JOINT DYSFUNCTION: Primary | ICD-10-CM

## 2018-05-08 PROCEDURE — 97112 NEUROMUSCULAR REEDUCATION: CPT | Performed by: PHYSICAL THERAPIST

## 2018-05-08 PROCEDURE — 97140 MANUAL THERAPY 1/> REGIONS: CPT | Performed by: PHYSICAL THERAPIST

## 2018-05-08 NOTE — PROGRESS NOTES
"Physical Therapy Note      SUBJECTIVE:   Changes since last seen:  Huma c/o being very stiff since she last saw the massage therapist.    \"I was ok after last visit, trying to increase my sitting time at work, up to 90 min, but that is too long and too uncomfortable.\"  \"I haven't slept well since last visit, just interrupted sleep.\"  \"I went to bed early last and did sleep 3 hrs without waking and I/m standing at work some now.\"  Huma notes she is still having to catch herself to relax, especially in the gluts.  She likes the SI belt when standing a lot.    \"The lower abs exercise is still a challenge for me.\"   \"I'm at 45 degrees on the prone knee flexion exercise.\"    \"The quad stretches are getting easier.\"   \"I only self corrected on the left once, since here, otherwise doing it on the right, 4-6x/day.\"  Huma went 2 days not wearing her ankle brace at work; she did ok the first day, but it was hard the second day.      Current level of pain:    5/10 RIght SI joint   Lowest level of pain since last seen:  2-3/10  After last visit.     Highest level of pain since last seen:   5-6/10      Past Medical History:  1.  DDD (degenerative disc disease), lumbar  2.  Cervical spondylosis without myelopathy  3.  Cervicalgia  4.  Left rotator cuff repair May 2012;  Left rotator cuff repair with biceps, Aug 2012;  Right rotator cuff repair, 2009  5.  Infantile idiopathic scoliosis of lumbosacral region  6.  Thoracic spondylosis without myelopathy  7.  Chronic midline low back pain without sciatica  8.  Acute midline low back pain without sciatica  9.  Status post lumbar spinal fusion, L1-S1, 04/04/17  10.  Pain in thoracic spine  11.  idiopathic scoliosis, thoracolumbar region  12.  Chronic SI joint pain  13.  Graves Disease  14.  Allergies to grass  15.  Left inguinal hernia repair, 2011  16.  Bilateral septoplasty, 2014   17.  Hx of elevated bp secondary to pain  18.  Left distal tibia/fibula fx, 1980  19.  Left " ankle sprain, 2 months ago, currently wearing air cast   20.  Hx of 2 normal vaginal deliveries and 1 , 29,27 and 25 years ago  21.  Last menses,     Patient Goals for Physical Therapy:  To get home exercise program for improved pelvic positioning and to decrease SI joint pain.        OBJECTIVE:  Visual swelling noted just inferior to the left lateral malleolus, today.     2018 -18          SI Joint Positioning  Right  Left Right  Left Right  Left Right  Left Right  Left Right  Left Right  Left Right  Left Right  Left Right  Left Right  Left Right  Left Right  Left Right  Left Right  Left       Innominate                         Anterior   x   x   x    x  x  x  x    x                 Posterior               x              x               x               x               x             x               x               x             Sacrum                             Unilateral rotation   x  x               x    x   x   x               x               x                               SI Joint Testing  Right  Left Right  Left Right  Left Right  Left Right  Left Right  Left Right  Left Right  Left Right  Left Right  Left Right  Left Right  Left Right  Left Right  Left Right  Left           Distraction    +           +   +        +   +           +   +          +     +         +   +           +   +           +   +          +                  Lashell's    +       slight   +        -   +          -   -          -    -         -   -          -                    Compression     -         -   -        -                        Prone Press Up   No change   -        -                                  Special Tests  Right   Left Right  Left Right  Left Right  Left Right  Left Right  Left Right Left  Right  Left Right  Left Right  Left Right  Left Right  Left Right  Left Right Left  Right  Left      Straight Leg Raise                                  Active     -         -                           Passive   -          -                          Hip Scour Test   sl        -                                                                  Palpation:      Muscle/Bone Irritation  Date 03/27/2018 04/05/18 04/17/18 044/19/18 04/24/18 04/26/18 05/01/18 05/08/18           Right  Left Right  Left Right  Left Right  Left Right  Left Right  Left Right  Left Right  Left Right  Left Right  Left Right  Left Right  Left Right  Left Right  Left Right  Left   Piriformis  ++        +   +         + +         sl   +          +   +            +   +        -  +          sl  ++          -          Gr. Trochanter  +        sl   +        sl   Sl       sl   Sl          sl   Sl        Sl    -           -  -           -  sl            -          IT Band  -        -   -          -    -           -   Sl        Sl   sl            -  sl         -  -            -          Quadratus Lumborum  sl        sl   sl      sl   -          -   -           -   Sl        sl  ++        -  ++         +  ++           -          Ischial Tuberosity  -        -   -            -    -            -   -            -   -           -   -           -   -            -          Sacrococcygeal Ligs  +      slight Not tested   -          - Not tested   Sl         -   -         sl   -            -  +            -          Paraspinals  +     sl   +       +    Sl         -  -           -  +           -   +           -          Spinous Processes                                     T-spine rotated to   -           -                        L-spine rotated to  L2-5     -  L1-5 L3-5  L2-5  L2-5  L2-5 Sl L2-5 L1-5       -          Adductor Aden  -          ++  sl          +   sl        +     +         +   +       sl   +        sl Sl        sl          Iliopsoas  slight   ++  sl          +    +        sl  +         +   +          +   +       +   +       +          Quad Origin slight slight    -         -    -        -   -           -    -    "      -   -          -          SI Joint  Cyst like structure over right SI joint  ++        -    +        sl    +        +   ++       +   +         +      +  +            +      +   +       sl   +        +          Pubic Symphysis           ++ Not tested             +                            Rectus Diastasis 2 1/2finger                                   Leg Length:  The left lower extremity is 1/4\" shorter than the right    Monthly Objective Measurement/Functional Activity  Date: 03/27/2018 04/26/18         Oswestry Pain Score 60/100    51/100                               AROM Lumbar Spine: Degrees   Degrees      Degrees      Degrees      Degrees      Degrees      Degrees    Degrees      Flexion 120 pain right        124              Extension 35 pain right         39            Right Lat. Flexion 45        43             Left Lat. Flexion 33        33            Right Lat Shift Pelvis  no change No change             Left Lat Shift Pelvis Inc pain   Inc pain right SI joint                    AROM Hips:  (degrees) Right      Left Right Left Right  Left Right  Left Right  Left Right  Left Right  Left Right  Left      Flexion 125      120  125    125                  Abduction 38       35    45     42              Int. Rotation 26       20   40      35              Ext. Rotation  40       49   45      49                       Left AROM Ankle (degrees) Pre Rx  Post (as of 04/17/18)           Post           dorsiflexion 4          12                      12           plantarflexion       62             65           inversion      30             35           eversion      40             40                               Flexibility:   (degrees) Right    Left Right  Left Right  Left Right  Left Right  Left Right  Left Right  Left Right  Left      Hamstrings -10       -19  -15      -45            Quadriceps 39       32   60     30            HipExt/Rot(piriformis) 39       46  42       45            Hip Int/Rotators 23 "       26   25      28            Hip Flexors (iliopsoas) Not tested         Not tested            IT Band Not tested          Not tested                    Reflexes: Right      Left Right  Left Right  Left Right  Left Right  Left Right  Left Right  Left Right  Left      L4 (Quad) -       -             S1 (Achilles) -       -                     Root Level  - Motor Right      Left Right  Left Right  Left Right  Left Right  Left Right  Left Right  Left Right  Left     T12             Rectus Abdominus 4+/5 4+/5 to 5/5            L1              Paraspinals 5/5           5/5 5/5        5/5            L2              Hip Flexion 5/5           5/5 5/5        5/5            L3             Quads Not tested  5/5       5/5            L4              Anterior Tibialis 5/5          5/5 5/5       5/5            L5             Gr. Toe Extension 5/5          5/5 5/5       5/5            S1            Gastroc/Sol/Peroneals 5/5        4+/5 5/5    pain in left ankle                    Functional Strength:             Single LegStanceTime (seconds) R:6 inc   L:1    R:      L: R:      L: R:     L: R:      L: R:      L: R:      L: R:      L:     Pelvic Floor Isolation (seconds) -             Inner Unit  Isolation (seconds) -                     Functional Activities:              Sit w/o pain? Pain increases (minutes) Yes/ 30 min    Yes/ 35-40            Stand w/o pain? Yes/ 30 min Yes/ 45            Walk w/o pain? No/ 15 min Yes/10 min            Steps w/o pain? No/ 1 fl Yes/ 1 fl            Drive w/o pain? No/ 15-30 min Yes/5-10            Work w/o pain? No/ on light duty Yes/ 35-40            # times wakes w/ pain? Only sleeps 1 hr at a time, then wakes with pain Wakes every 3 hrs.            PLAN OF CARE:    ASSESSMENT:  Problem List:   1. SI joint dysfunction  2. Lack of spinal stabilization  3. Postural dysfunction     Discussion:    Overall, Huma continues to improve however, she had a slight flare up of her pain when trying  to go 2 days without her ankle brace.  Recommended Huma get a compression knee high to help manage the swelling in the left ankle.   She is isolating the glut max in retro step but she has to be intentional about it.   She did well with the stabilization ball exercises given today.  She is now able to sense her need for inner unit stabilization when throwing a ball in supine and in stance.   Did not do any work on gait today secondary to the flare up in the left ankle.     Short Term Goals: (4-6 weeks)                                 Date Met:                1.   pt independent in postural correction       04/05/18  2.   pt independent in SI joint self-corrections      04/05/18  3.   pt independent in exer to decrease post. disc pressures    04/17/18  4.   pt able to sleep through night without pain  5.   pt able to sit 40 minutes without pain       05/08/18  6.   pt able to walk 20 minutes without pain  7.   Reduce pt pain to no greater than 3/10   8.   Decrease Oswestry Pain Score to no greater than 50/100  9.  Pt able to isolate the inner unit without gravity      04/19/18  10.  Pt able to engage the inner unit against gravity and hold 10 sec   04/19/18  11.  Pt able to perform hip abd without piriformis x6     04/24/18  12.  Pt able to perform hip add without engaging iliopsoas x6    04/24/18  13.  Pt able to perform lower abs x6 without pelvic motion    04/26/18  14.  Pt able to perform bent knee fall out x6 without engaging the piriformis  05/01/18  15.  Pt able to do prone knee flexion to 45 degrees, bilaterally, w/o pelvic motion, x6 05/08/18  16.  Pt able to bridge x6 w/ inner unit and no piriformis  17.  Pt able to retro step x6 without piriformis   18.  Pt able to throw and catch stability ball in supine with inner unit on x6   19.  Pt able to move laterally and in AP direction without pain x6     Long Term Goals:(3-5 months)        Date Met:      1.  pt independent in self-management of symptoms        2.  pt independent in home program      3.  pt able to work 6 hours without pain      4.  pt able to sit 60 minutes without pain      5.  pt able to walk 45 min without pain    Progress Towards Goals:  As expected to slightly slower than expected    Treatment Plan:   1.  Ultrasound to increase extensibility, decrease pain, if needed  2.  Electrical stimulation for muscle relaxation, decrease pain, if needed, iontophoresis  3.  Manual therapy to increase function, decrease pain  4.  Therapeutic exercise to increase function, decrease pain  5.  Home programming and d/c planning to increase function and decrease pain    Frequency & Duration:  Pt will be seen on a once/week basis for  5 more visits    Patient Participated in and Agrees With This Plan of Care and Goals:  YES  Rehab Potential:  Fair to good      Evelyn Velez, PT, MS  Physical Therapist  [place]# [id number]      TREATMENT TODAY:    Total Treatment Time: (time in clinic)          60 min  Timed Code Treatment Minutes:   60      Supplies given:     Manual Therapy:  (MA)  RX min:  35  Manual posterior rotation of right innominate    Positional Isometric contraction (PIC) of left iliopsoas    Positional Isometric contraction of (PIC) gluteus minimus    Distraction of both SI jts in open pack hip position  Piriformis stretching, bilaterally    Quadratus lumborum stretching, bilaterally    Anterior/Inferior Mobilization of  right hip    Positional Isometric Contraction of multifidus  Iliopsoas release  Iliopsoas stretching  Myofascial work, trunk  Manual axial distraction  Mobilization of left foot/ankle      Therapeutic Activities:  (TA)  RX min:      Neuromuscular Reeducation: (NMR)  RX min:   25    Ultrasound:  RX min:            1.6 vogel/cm2                            Location:     Iontophoresis:  6 hr patch                                Location:                               Procedures:      Key:  i=instructed        r=review        c=corrected        a=adapted    Code Procedure/Instruction 03/27/2018 04/05/18 04/17/18 04/19/18 04/24/18 04/26/18 05/01/18 05/08/18           TA         Sleeping Positions instructed                                TA Sternum-Up Posture instructed                  TA SI jt. self-correction instructed reviewed  reviewed               TA Lumbar Facet PIC                   TA   Order of Self-Corrections instructed reviewed                 TA Use of lumbar pillow instructed                  TA Use of cervical roll instructed                  TA Use of orthotics instructed                  TA Use of SI belt instructed                  TA Use of Nada chair                   TA Use of Ice                   TA Use of [employer]/ heat                   TA Use of TENS unit                   TA Use of iontophoresis                   TA Decreasing Disc Pressures  instructed                 TA Toe raises at counter       instructed                                                                        NMR Pelvic Floor Isolation  instructed reviewed reviewed               NMR Transverse Abdominus  instructed reviewed reviewed               NMR Multifidus Isolation  instructed reviewed reviewed               NMR InnerUnit against Gravity   instructed reviewed               NMR Sit-stand w/ inner unit   instructed                NMR Roll w/ inner unit   instructed                NMR Walk w/ inner unit    instructed                NMR Up/down steps w/ inner unit   instructed                NMR Water Exer Instruction        instructed           NMR Hip Abd w/o piriformis    instructed reviewed  reviewed            NMR Hip Add w/o iliop/ham     instructed redeview  reviewed            NMR Lower abs in supine     instructed corrected reviewed            NMR Abd obliques in supine     instructed corrected reviewed            NMR Prone Knee Flexion      instructed reviewed            NMR Prone glut vern                   NMR Retro Step       instructed             NMR Retro Walk       instructed            NMR Retro walk on treadmill       instructed            NMR Forward walk w/ inner unit                   NMR Balance Instruction        instructed           NMR Stability Ball A/P & Lateral        instructed           NMR Ball Catch/Throw /Supine        instructed           NMR Ball Catch/Throw /Stand        instructed           NMR StabilityBall All 4's Arm Lift                   NMR StabilityBall Prone Walk Out                   NMR StabilityBall Supine Oblique                   NMR Stability Ball sit-supine-sit                   NMR Walking Prog Progression                   MNR Home program sequencing                                       TA Hamstring stretch      instructed             TA Hip Ext Rot Stretch                   TA Hip Int Rot Stretch                   TA Hip Flex/IT Stretch                   TA Hip Add Stretch      instructed             TA Lats Dorsi Stretch                   TA Gastroc/soleus stretch                   TA QuadratusLumborm Stretch                      Supine                      Stance                   TA Quad Stretch                                       NMR Wall Push Ups                   NMR Planking                   NMR BOSU Ball Exercises                   NMR Lateral Bridge Drop                   NMR [place]                   NMR O'Feldt Lat Pull Down                   NMR O'Feldt Hip Ext                   NMR O'Feldt Trunk Ext                                       TA CervDiscExtSup/stnd                   TA Cerv Stretches                   TA Self PIC Cervical Spine                   TA Neural Gliding                   TA Ant/Mid Scalene Strch                   TA Biceps stretch                   TA Rotator Cuff Stretch                   TA Anterior Chest Stretch                   TA Wrist Ext Stretch                                       NMR Prone Arm Lifts                   NMR Scapula Stabilization                   NMR  Occulomotor Isometrics                   NMR Cervical Isometrics                                       TA Shld AROM w/bar                   TA Shld Capsular Stretching                   TA Self PIC Thor Spine                   TA Rot Cuff Therabd, beginner                   TA Rot Cuff Therabd, blessing Velez, PT, MS  Physical Therapist  [place]# [id number]

## 2018-05-09 ENCOUNTER — TREATMENT (OUTPATIENT)
Dept: PHYSICAL THERAPY | Facility: CLINIC | Age: 58
End: 2018-05-09

## 2018-05-09 DIAGNOSIS — R29.3 POSTURE IMBALANCE: ICD-10-CM

## 2018-05-09 DIAGNOSIS — M53.3 SACROILIAC JOINT DYSFUNCTION: Primary | ICD-10-CM

## 2018-05-09 PROCEDURE — 97140 MANUAL THERAPY 1/> REGIONS: CPT | Performed by: PHYSICAL THERAPIST

## 2018-05-09 PROCEDURE — 97112 NEUROMUSCULAR REEDUCATION: CPT | Performed by: PHYSICAL THERAPIST

## 2018-05-09 NOTE — PROGRESS NOTES
"Physical Therapy Note      SUBJECTIVE:   Changes since last seen:  \"Not bad!\"  \"I woke up this am and I wasn't good, self corrected and took meds and felt good til drove over here, 20 min in car, self corrected on left when got out of car and felt better!\"  \"I slept well last night, got 3 hrs straight, then another 3 hrs, stayed asleep longer.\"      Current level of pain:    3-4/10 RIght SI joint   Lowest level of pain since last seen:  2-3/10  After last visit, yesterday    Highest level of pain since last seen:   5/10      Past Medical History:  1.  DDD (degenerative disc disease), lumbar  2.  Cervical spondylosis without myelopathy  3.  Cervicalgia  4.  Left rotator cuff repair May 2012;  Left rotator cuff repair with biceps, Aug 2012;  Right rotator cuff repair,   5.  Infantile idiopathic scoliosis of lumbosacral region  6.  Thoracic spondylosis without myelopathy  7.  Chronic midline low back pain without sciatica  8.  Acute midline low back pain without sciatica  9.  Status post lumbar spinal fusion, L1-S1, 17  10.  Pain in thoracic spine  11.  idiopathic scoliosis, thoracolumbar region  12.  Chronic SI joint pain  13.  Graves Disease  14.  Allergies to grass  15.  Left inguinal hernia repair,   16.  Bilateral septoplasty,    17.  Hx of elevated bp secondary to pain  18.  Left distal tibia/fibula fx,   19.  Left ankle sprain, 2 months ago, currently wearing air cast   20.  Hx of 2 normal vaginal deliveries and 1 , 29,27 and 25 years ago  21.  Last menses,     Patient Goals for Physical Therapy:  To get home exercise program for improved pelvic positioning and to decrease SI joint pain.        OBJECTIVE:  Visual swelling noted just inferior to the left lateral malleolus, today.     2018 -18         SI Joint Positioning  Right  Left Right  Left Right  Left Right  Left Right  Left Right  Left Right  Left " Right  Left Right  Left Right  Left Right  Left Right  Left Right  Left Right  Left Right  Left       Innominate                         Anterior   x   x   x    x  x  x  x    x              sl                Posterior               x              x               x               x               x             x               x               x    sl            Sacrum                             Unilateral rotation   x  x               x    x   x   x               x               x               x                              SI Joint Testing  Right  Left Right  Left Right  Left Right  Left Right  Left Right  Left Right  Left Right  Left Right  Left Right  Left Right  Left Right  Left Right  Left Right  Left Right  Left           Distraction    +           +   +        +   +           +   +          +     +         +   +           +   +           +   +          +   +           +                 Lashell's    +       slight   +        -   +          -   -          -    -         -   -          -                    Compression     -         -   -        -                        Prone Press Up   No change   -        -                                  Special Tests  Right   Left Right  Left Right  Left Right  Left Right  Left Right  Left Right Left  Right  Left Right  Left Right  Left Right  Left Right  Left Right  Left Right Left  Right  Left      Straight Leg Raise                                  Active    -         -                           Passive   -          -                          Hip Scour Test   sl        -                                                                  Palpation:      Muscle/Bone Irritation  Date 03/27/2018 04/05/18 04/17/18 044/19/18 04/24/18 04/26/18 05/01/18 05/08/18 05/09/18          Right  Left Right  Left Right  Left Right  Left Right  Left Right  Left Right  Left Right  Left Right  Left Right  Left Right  Left Right  Left Right  Left Right  Left Right  Left   Piriformis  ++        +   +          + +         sl   +          +   +            +   +        -  +          sl  ++          -   +        -         Gr. Trochanter  +        sl   +        sl   Sl       sl   Sl          sl   Sl        Sl    -           -  -           -  sl            -   Sl      sl         IT Band  -        -   -          -    -           -   Sl        Sl   sl            -  sl         -  -            -   -          -         Quadratus Lumborum  sl        sl   sl      sl   -          -   -           -   Sl        sl  ++        -  ++         +  ++           -   ++        -          Ischial Tuberosity  -        -   -            -    -            -   -            -   -           -   -           -   -            -   -            -         Sacrococcygeal Ligs  +      slight Not tested   -          - Not tested   Sl         -   -         sl   -            -  +            - Not tested         Paraspinals  +     sl   +       +    Sl         -  -           -  +           -   +           -   +          -         Spinous Processes                                     T-spine rotated to   -           -                        L-spine rotated to  L2-5     -  L1-5 L3-5  L2-5  L2-5  L2-5 Sl L2-5 L1-5       -  L1-5       -         Adductor Aden  -          ++  sl          +   sl        +     +         +   +       sl   +        sl Sl        sl   -           -         Iliopsoas  slight   ++  sl          +    +        sl  +         +   +          +   +       +   +       +   Sl         sl         Quad Origin slight slight    -         -    -        -   -           -    -         -   -          -  -            -         SI Joint  Cyst like structure over right SI joint  ++        -    +        sl    +        +   ++       +   +         +      +  +            +      +   +       sl   +        +   +       sl         Pubic Symphysis           ++ Not tested             + No tested                           Rectus Diastasis 2 1/2finger                         "           Leg Length:  The left lower extremity is 1/4\" shorter than the right    Monthly Objective Measurement/Functional Activity  Date: 03/27/2018 04/26/18         Oswestry Pain Score 60/100    51/100                               AROM Lumbar Spine: Degrees   Degrees      Degrees      Degrees      Degrees      Degrees      Degrees    Degrees      Flexion 120 pain right        124              Extension 35 pain right         39            Right Lat. Flexion 45        43             Left Lat. Flexion 33        33            Right Lat Shift Pelvis  no change No change             Left Lat Shift Pelvis Inc pain   Inc pain right SI joint                    AROM Hips:  (degrees) Right      Left Right Left Right  Left Right  Left Right  Left Right  Left Right  Left Right  Left      Flexion 125      120  125    125                  Abduction 38       35    45     42              Int. Rotation 26       20   40      35              Ext. Rotation  40       49   45      49                       Left AROM Ankle (degrees) Pre Rx  Post (as of 04/17/18)           Post           dorsiflexion 4          12                      12           plantarflexion       62             65           inversion      30             35           eversion      40             40                               Flexibility:   (degrees) Right    Left Right  Left Right  Left Right  Left Right  Left Right  Left Right  Left Right  Left      Hamstrings -10       -19  -15      -45            Quadriceps 39       32   60     30            HipExt/Rot(piriformis) 39       46  42       45            Hip Int/Rotators 23       26   25      28            Hip Flexors (iliopsoas) Not tested         Not tested            IT Band Not tested          Not tested                    Reflexes: Right      Left Right  Left Right  Left Right  Left Right  Left Right  Left Right  Left Right  Left      L4 (Quad) -       -             S1 (Achilles) -       -                   "   Root Level  - Motor Right      Left Right  Left Right  Left Right  Left Right  Left Right  Left Right  Left Right  Left     T12             Rectus Abdominus 4+/5 4+/5 to 5/5            L1              Paraspinals 5/5           5/5 5/5        5/5            L2              Hip Flexion 5/5           5/5 5/5        5/5            L3             Quads Not tested  5/5       5/5            L4              Anterior Tibialis 5/5          5/5 5/5       5/5            L5             Gr. Toe Extension 5/5          5/5 5/5       5/5            S1            Gastroc/Sol/Peroneals 5/5        4+/5 5/5    pain in left ankle                    Functional Strength:             Single LegStanceTime (seconds) R:6 inc   L:1    R:      L: R:      L: R:     L: R:      L: R:      L: R:      L: R:      L:     Pelvic Floor Isolation (seconds) -             Inner Unit  Isolation (seconds) -                     Functional Activities:              Sit w/o pain? Pain increases (minutes) Yes/ 30 min    Yes/ 35-40            Stand w/o pain? Yes/ 30 min Yes/ 45            Walk w/o pain? No/ 15 min Yes/10 min            Steps w/o pain? No/ 1 fl Yes/ 1 fl            Drive w/o pain? No/ 15-30 min Yes/5-10            Work w/o pain? No/ on light duty Yes/ 35-40            # times wakes w/ pain? Only sleeps 1 hr at a time, then wakes with pain Wakes every 3 hrs.            PLAN OF CARE:    ASSESSMENT:  Problem List:   1. SI joint dysfunction  2. Lack of spinal stabilization  3. Postural dysfunction     Discussion:    Huma continues to make steady progress.  She did well with the instruction and execution of the spinal stabilization exercises on the stability ball today.  She has a good understanding and is able to tell when she substitutes with either the quadratus lumborums or the piriformis.    The left ankle swelling has decreased slightly, today.  She is continuing with the proprioception exercises at home.  Will resume gait work next week.      Short Term Goals: (4-6 weeks)                                 Date Met:                1.   pt independent in postural correction       04/05/18  2.   pt independent in SI joint self-corrections      04/05/18  3.   pt independent in exer to decrease post. disc pressures    04/17/18  4.   pt able to sleep through night without pain  5.   pt able to sit 40 minutes without pain       05/08/18  6.   pt able to walk 20 minutes without pain  7.   Reduce pt pain to no greater than 3/10   8.   Decrease Oswestry Pain Score to no greater than 50/100  9.  Pt able to isolate the inner unit without gravity      04/19/18  10.  Pt able to engage the inner unit against gravity and hold 10 sec   04/19/18  11.  Pt able to perform hip abd without piriformis x6     04/24/18  12.  Pt able to perform hip add without engaging iliopsoas x6    04/24/18  13.  Pt able to perform lower abs x6 without pelvic motion    04/26/18  14.  Pt able to perform bent knee fall out x6 without engaging the piriformis  05/01/18  15.  Pt able to do prone knee flexion to 45 degrees, bilaterally, w/o pelvic motion, x6 05/08/18  16.  Pt able to bridge x6 w/ inner unit and no piriformis  17.  Pt able to retro step x6 without piriformis   18.  Pt able to throw and catch stability ball in supine with inner unit on x6   19.  Pt able to move laterally and in AP direction without pain x6    05/09/18  20.  Pt able to perform prone walk out maintaining inner unit, x6  21.  Pt able to perform all 4s arm lift without pain x6  22.  Pt able to perform supine obliques on ball x6 without engaging QLs      Long Term Goals:(3-5 months)        Date Met:      1.  pt independent in self-management of symptoms       2.  pt independent in home program      3.  pt able to work 6 hours without pain      4.  pt able to sit 60 minutes without pain      5.  pt able to walk 45 min without pain    Progress Towards Goals:  As expected    Treatment Plan:   1.  Ultrasound to increase  extensibility, decrease pain, if needed  2.  Electrical stimulation for muscle relaxation, decrease pain, if needed, iontophoresis  3.  Manual therapy to increase function, decrease pain  4.  Therapeutic exercise to increase function, decrease pain  5.  Home programming and d/c planning to increase function and decrease pain    Frequency & Duration:  Pt will be seen on a once/week basis for  4 more visits    Patient Participated in and Agrees With This Plan of Care and Goals:  YES  Rehab Potential:  Fair to good      Evelyn Velez, PT, MS  Physical Therapist  [place]# [id number]      TREATMENT TODAY:    Total Treatment Time: (time in clinic)             60    min  Timed Code Treatment Minutes:   60      Supplies given:     Manual Therapy:  (MA)  RX min:  30  Manual posterior rotation of right innominate    Positional Isometric contraction (PIC) of left iliopsoas    Positional Isometric contraction of (PIC) gluteus minimus    Distraction of both SI jts in open pack hip position  Piriformis stretching, bilaterally    Quadratus lumborum stretching, bilaterally    Anterior/Inferior Mobilization of  right hip    Positional Isometric Contraction of multifidus  Iliopsoas release  Iliopsoas stretching  Myofascial work, trunk  Manual axial distraction  Mobilization of left foot/ankle      Therapeutic Activities:  (TA)  RX min:      Neuromuscular Reeducation: (NMR)  RX min:   30    Ultrasound:  RX min:            1.6 vogel/cm2                            Location:     Iontophoresis:  6 hr patch                                Location:                               Procedures:      Key:  i=instructed        r=review        c=corrected        a=adapted   Code Procedure/Instruction 03/27/2018 04/05/18 04/17/18 04/19/18 04/24/18 04/26/18 05/01/18 05/08/18 05/09/18          TA         Sleeping Positions instructed                                TA Sternum-Up Posture instructed                  TA SI jt. self-correction instructed reviewed   reviewed               TA Lumbar Facet PIC                   TA   Order of Self-Corrections instructed reviewed                 TA Use of lumbar pillow instructed                  TA Use of cervical roll instructed                  TA Use of orthotics instructed                  TA Use of SI belt instructed                  TA Use of Nada chair                   TA Use of Ice                   TA Use of [employer]/ heat                   TA Use of TENS unit                   TA Use of iontophoresis                   TA Decreasing Disc Pressures  instructed                 TA Toe raises at counter       instructed                                                                        NMR Pelvic Floor Isolation  instructed reviewed reviewed               NMR Transverse Abdominus  instructed reviewed reviewed               NMR Multifidus Isolation  instructed reviewed reviewed               NMR InnerUnit against Gravity   instructed reviewed               NMR Sit-stand w/ inner unit   instructed                NMR Roll w/ inner unit   instructed                NMR Walk w/ inner unit    instructed                NMR Up/down steps w/ inner unit   instructed                NMR Water Exer Instruction        instructed           NMR Hip Abd w/o piriformis    instructed reviewed  reviewed            NMR Hip Add w/o iliop/ham     instructed redeview  reviewed            NMR Lower abs in supine     instructed corrected reviewed            NMR Abd obliques in supine     instructed corrected reviewed            NMR Prone Knee Flexion      instructed reviewed            NMR Prone glut vern                   NMR Retro Step       instructed            NMR Retro Walk       instructed            NMR Retro walk on treadmill       instructed            NMR Forward walk w/ inner unit                   NMR Balance Instruction        instructed           NMR Stability Ball A/P & Lateral        instructed           NMR Ball Catch/Throw  /Supine        instructed           NMR Ball Catch/Throw /Stand        instructed           NMR StabilityBall All 4's Arm Lift         instructed          NMR StabilityBall Prone Walk Out         instructed          NMR StabilityBall Supine Oblique         instructed          NMR Stability Ball sit-supine-sit         instructed          NMR Walking Prog Progression                   MNR Home program sequencing         instructed                              TA Hamstring stretch      instructed             TA Hip Ext Rot Stretch                   TA Hip Int Rot Stretch                   TA Hip Flex/IT Stretch                   TA Hip Add Stretch      instructed             TA Lats Dorsi Stretch                   TA Gastroc/soleus stretch                   TA QuadratusLumborm Stretch                      Supine                      Stance                   TA Quad Stretch                                       NMR Wall Push Ups                   NMR Planking                   NMR BOSU Ball Exercises                   NMR Lateral Bridge Drop                   NMR [place]                   NMR O'Feldt Lat Pull Down                   NMR O'Feldt Hip Ext                   NMR O'Feldt Trunk Ext                                       TA CervDiscExtSup/stnd                   TA Cerv Stretches                   TA Self PIC Cervical Spine                   TA Neural Gliding                   TA Ant/Mid Scalene Strch                   TA Biceps stretch                   TA Rotator Cuff Stretch                   TA Anterior Chest Stretch                   TA Wrist Ext Stretch                                       NMR Prone Arm Lifts                   NMR Scapula Stabilization                   NMR Occulomotor Isometrics                   NMR Cervical Isometrics                                       TA Shld AROM w/bar                   TA Shld Capsular Stretching                   TA Self PIC Thor Spine                   TA Rot  Cuff Therabd, beginner                   SHAILA Jose Cuff Therabd, blessing Velez, PT, MS  Physical Therapist  [place]# [id number]

## 2018-05-15 ENCOUNTER — TREATMENT (OUTPATIENT)
Dept: PHYSICAL THERAPY | Facility: CLINIC | Age: 58
End: 2018-05-15

## 2018-05-15 DIAGNOSIS — R29.3 POSTURE IMBALANCE: ICD-10-CM

## 2018-05-15 DIAGNOSIS — M53.3 SACROILIAC JOINT DYSFUNCTION: Primary | ICD-10-CM

## 2018-05-15 PROCEDURE — 97530 THERAPEUTIC ACTIVITIES: CPT | Performed by: PHYSICAL THERAPIST

## 2018-05-15 PROCEDURE — 97140 MANUAL THERAPY 1/> REGIONS: CPT | Performed by: PHYSICAL THERAPIST

## 2018-05-15 NOTE — PROGRESS NOTES
"Physical Therapy Note      SUBJECTIVE:   Changes since last seen:  Huma had spasming in the left rhomboids after doing the all 4s arm lift.  She got a virus for 2 days with a headache, body ache and a fever.  By Saturday, she felt so much better so wemt outside and picked a few weeds, self corrected as stood up, felt tired went to bed and woke at 1 am in horrible pain. \"No amount of self correction would decrease the pain!\"   Self corrected 10x yesterday and is better today.   The left quad spasming is, \"only once in a while now, more of a flash now not a gripping spasm.\"   \"I ordered a new ankle brace and it is more supportive and more comfortable than the Don Kerline brace I had.\"     Current level of pain:    5/10 RIght SI joint   Lowest level of pain since last seen:  2/10  After last visit  Highest level of pain since last seen:   8/10  Saturday night     Past Medical History:  1.  DDD (degenerative disc disease), lumbar  2.  Cervical spondylosis without myelopathy  3.  Cervicalgia  4.  Left rotator cuff repair May 2012;  Left rotator cuff repair with biceps, Aug 2012;  Right rotator cuff repair,   5.  Infantile idiopathic scoliosis of lumbosacral region  6.  Thoracic spondylosis without myelopathy  7.  Chronic midline low back pain without sciatica  8.  Acute midline low back pain without sciatica  9.  Status post lumbar spinal fusion, L1-S1, 17  10.  Pain in thoracic spine  11.  idiopathic scoliosis, thoracolumbar region  12.  Chronic SI joint pain  13.  Graves Disease  14.  Allergies to grass  15.  Left inguinal hernia repair,   16.  Bilateral septoplasty,    17.  Hx of elevated bp secondary to pain  18.  Left distal tibia/fibula fx,   19.  Left ankle sprain, 2 months ago, currently wearing air cast   20.  Hx of 2 normal vaginal deliveries and 1 , 29,27 and 25 years ago  21.  Last menses,     Patient Goals for Physical Therapy:  To get home exercise program for improved " pelvic positioning and to decrease SI joint pain.        OBJECTIVE:   03/27/2018 04/05/18 04/17/18 04/19/18 04/24/18 04/26/18 05/01/18 -5/08/18 05/09/18 05/15/18        SI Joint Positioning  Right  Left Right  Left Right  Left Right  Left Right  Left Right  Left Right  Left Right  Left Right  Left Right  Left Right  Left Right  Left Right  Left Right  Left Right  Left       Innominate                         Anterior   x   x   x    x  x  x  x    x              sl    x               Posterior               x              x               x               x               x             x               x               x    sl               x           Sacrum                             Unilateral rotation   x  x               x    x   x   x               x               x               x    x                             SI Joint Testing  Right  Left Right  Left Right  Left Right  Left Right  Left Right  Left Right  Left Right  Left Right  Left Right  Left Right  Left Right  Left Right  Left Right  Left Right  Left           Distraction    +           +   +        +   +           +   +          +     +         +   +           +   +           +   +          +   +           +   +           +                Lashell's    +       slight   +        -   +          -   -          -    -         -   -          -                    Compression     -         -   -        -                        Prone Press Up   No change   -        -                                  Special Tests  Right   Left Right  Left Right  Left Right  Left Right  Left Right  Left Right Left  Right  Left Right  Left Right  Left Right  Left Right  Left Right  Left Right Left  Right  Left      Straight Leg Raise                                  Active    -         -                           Passive   -          -                          Hip Scour Test   sl        -                                                                  Palpation:      Muscle/Bone  Irritation  Date 03/27/2018 04/05/18 04/17/18 044/19/18 04/24/18 04/26/18 05/01/18 05/08/18 05/09/18 05/15/18         Right  Left Right  Left Right  Left Right  Left Right  Left Right  Left Right  Left Right  Left Right  Left Right  Left Right  Left Right  Left Right  Left Right  Left Right  Left   Piriformis  ++        +   +         + +         sl   +          +   +            +   +        -  +          sl  ++          -   +        -    +          -        Gr. Trochanter  +        sl   +        sl   Sl       sl   Sl          sl   Sl        Sl    -           -  -           -  sl            -   Sl      sl    Sl        sl        IT Band  -        -   -          -    -           -   Sl        Sl   sl            -  sl         -  -            -   -          -    -            -        Quadratus Lumborum  sl        sl   sl      sl   -          -   -           -   Sl        sl  ++        -  ++         +  ++           -   ++        -    +           +        Ischial Tuberosity  -        -   -            -    -            -   -            -   -           -   -           -   -            -   -            -    -           -        Sacrococcygeal Ligs  +      slight Not tested   -          - Not tested   Sl         -   -         sl   -            -  +            - Not tested   +        Paraspinals  +     sl   +       +    Sl         -  -           -  +           -   +           -   +          -   +           -        Spinous Processes                                     T-spine rotated to   -           -                        L-spine rotated to  L2-5     -  L1-5 L3-5  L2-5  L2-5  L2-5 Sl L2-5 L1-5       -  L1-5       - L5        Adductor Aden  -          ++  sl          +   sl        +     +         +   +       sl   +        sl Sl        sl   -           -   -          -        Iliopsoas  slight   ++  sl          +    +        sl  +         +   +          +   +       +   +       +   Sl         sl   +          +        Quad  "Origin slight slight    -         -    -        -   -           -    -         -   -          -  -            -         SI Joint  Cyst like structure over right SI joint  ++        -    +        sl    +        +   ++       +   +         +      +  +            +      +   +       sl   +        +   +       sl   +        sl        Pubic Symphysis           ++ Not tested             + No tested                           Rectus Diastasis 2 1/2finger                                   Leg Length:  The left lower extremity is 1/4\" shorter than the right    Monthly Objective Measurement/Functional Activity  Date: 03/27/2018 04/26/18          Oswestry Pain Score 60/100    51/100                               AROM Lumbar Spine: Degrees   Degrees      Degrees      Degrees      Degrees      Degrees      Degrees    Degrees      Flexion 120 pain right        124              Extension 35 pain right         39            Right Lat. Flexion 45        43             Left Lat. Flexion 33        33            Right Lat Shift Pelvis  no change No change             Left Lat Shift Pelvis Inc pain   Inc pain right SI joint                    AROM Hips:  (degrees) Right      Left Right Left Right  Left Right  Left Right  Left Right  Left Right  Left Right  Left      Flexion 125      120  125    125                  Abduction 38       35    45     42              Int. Rotation 26       20   40      35              Ext. Rotation  40       49   45      49                       Left AROM Ankle (degrees) Pre Rx  Post (as of 04/17/18)           Post           dorsiflexion 4          12                      12           plantarflexion       62             65           inversion      30             35           eversion      40             40                         05/15/18        Flexibility:   (degrees) Right    Left Right  Left Right  Left Right  Left Right  Left Right  Left Right  Left Right  Left      Hamstrings -10       -19  -15      -45    "         Quadriceps 39       32   60     30            HipExt/Rot(piriformis) 39       46  42       45            Hip Int/Rotators 23       26   25      28            Hip Flexors (iliopsoas) Not tested         Not tested   0            0           IT Band Not tested          Not tested  -7       -12           Lats dorsi   150      161        Reflexes: Right      Left Right  Left Right  Left Right  Left Right  Left Right  Left Right  Left Right  Left      L4 (Quad) -       -             S1 (Achilles) -       -                     Root Level  - Motor Right      Left Right  Left Right  Left Right  Left Right  Left Right  Left Right  Left Right  Left     T12             Rectus Abdominus 4+/5 4+/5 to 5/5            L1              Paraspinals 5/5           5/5 5/5        5/5            L2              Hip Flexion 5/5           5/5 5/5        5/5            L3             Quads Not tested  5/5       5/5            L4              Anterior Tibialis 5/5          5/5 5/5       5/5            L5             Gr. Toe Extension 5/5          5/5 5/5       5/5            S1            Gastroc/Sol/Peroneals 5/5        4+/5 5/5    pain in left ankle                    Functional Strength:             Single LegStanceTime (seconds) R:6 inc   L:1    R:      L: R:      L: R:     L: R:      L: R:      L: R:      L: R:      L:     Pelvic Floor Isolation (seconds) -             Inner Unit  Isolation (seconds) -                     Functional Activities:              Sit w/o pain? Pain increases (minutes) Yes/ 30 min    Yes/ 35-40            Stand w/o pain? Yes/ 30 min Yes/ 45            Walk w/o pain? No/ 15 min Yes/10 min            Steps w/o pain? No/ 1 fl Yes/ 1 fl            Drive w/o pain? No/ 15-30 min Yes/5-10            Work w/o pain? No/ on light duty Yes/ 35-40            # times wakes w/ pain? Only sleeps 1 hr at a time, then wakes with pain Wakes every 3 hrs.            PLAN OF CARE:    ASSESSMENT:  Problem List:   1. SI joint  dysfunction  2. Lack of spinal stabilization  3. Postural dysfunction     Discussion:    Despite the flare up of pain this weekend, she was able to get her pain back down within 48 hrs with the self corrections.    It needs to be noted that she has been put through this SI joint/ spinal stabilization program on a quickened pace as her orthopod wanted her to have 4 weeks of physical therapy and then decide if surgery is warranted.  However, the ligaments of the SI joint are very slow to heal and thus typically would not have taught the stabilization exercises taught on last visit until 2 months into the program.  SInce Huma is  a PTA, it was a little easier to speed the instruction up however, her SI ligaments have not had optimal time to heal quite yet.  Huma is very pleased with her progress and is optimistic the improvement will continue.  She returns to her orthopod on Thursday.  Will await his assessment and proceed accordingly.     Short Term Goals: (4-6 weeks)                                 Date Met:                1.   pt independent in postural correction       04/05/18  2.   pt independent in SI joint self-corrections      04/05/18  3.   pt independent in exer to decrease post. disc pressures    04/17/18  4.   pt able to sleep through night without pain  5.   pt able to sit 40 minutes without pain       05/08/18  6.   pt able to walk 20 minutes without pain  7.   Reduce pt pain to no greater than 3/10   8.   Decrease Oswestry Pain Score to no greater than 50/100  9.  Pt able to isolate the inner unit without gravity      04/19/18  10.  Pt able to engage the inner unit against gravity and hold 10 sec   04/19/18  11.  Pt able to perform hip abd without piriformis x6     04/24/18  12.  Pt able to perform hip add without engaging iliopsoas x6    04/24/18  13.  Pt able to perform lower abs x6 without pelvic motion    04/26/18  14.  Pt able to perform bent knee fall out x6 without engaging the  piriformis  05/01/18  15.  Pt able to do prone knee flexion to 45 degrees, bilaterally, w/o pelvic motion, x6 05/08/18  16.  Pt able to bridge x6 w/ inner unit and no piriformis  17.  Pt able to retro step x6 without piriformis   18.  Pt able to throw and catch stability ball in supine with inner unit on x6   19.  Pt able to move laterally and in AP direction without pain x6    05/09/18  20.  Pt able to perform prone walk out maintaining inner unit, x6  21.  Pt able to perform all 4s arm lift without pain x6  22.  Pt able to perform supine obliques on ball x6 without engaging QLs      Long Term Goals:(3-5 months)        Date Met:      1.  pt independent in self-management of symptoms       2.  pt independent in home program      3.  pt able to work 6 hours without pain      4.  pt able to sit 60 minutes without pain      5.  pt able to walk 45 min without pain    Progress Towards Goals:  As expected    Treatment Plan:   1.  Ultrasound to increase extensibility, decrease pain, if needed  2.  Electrical stimulation for muscle relaxation, decrease pain, if needed, iontophoresis  3.  Manual therapy to increase function, decrease pain  4.  Therapeutic exercise to increase function, decrease pain  5.  Home programming and d/c planning to increase function and decrease pain    Frequency & Duration:  Pt will be seen on a once/week basis for  3 more visits    Patient Participated in and Agrees With This Plan of Care and Goals:  YES  Rehab Potential:  Fair to good      Evelyn Velez, PT, MS  Physical Therapist  [place]# [id number]      TREATMENT TODAY:    Total Treatment Time: (time in clinic)             60    min  Timed Code Treatment Minutes:   60      Supplies given:     Manual Therapy:  (MA)  RX min:  25  Manual posterior rotation of right innominate    Positional Isometric contraction (PIC) of left iliopsoas    Positional Isometric contraction of (PIC) gluteus minimus    Distraction of both SI jts in open pack hip  position  Piriformis stretching, bilaterally    Quadratus lumborum stretching, bilaterally    Anterior/Inferior Mobilization of  right hip    Positional Isometric Contraction of multifidus  Iliopsoas release  Iliopsoas stretching  Myofascial work, trunk  Manual axial distraction        Therapeutic Activities:  (TA)  RX min:  35    Neuromuscular Reeducation: (NMR)  RX min:       Ultrasound:  RX min:            1.6 vogel/cm2                            Location:     Iontophoresis:  6 hr patch                                Location:                               Procedures:      Key:  i=instructed        r=review        c=corrected        a=adapted   Code Procedure/Instruction 03/27/2018 04/05/18 04/17/18 04/19/18 04/24/18 04/26/18 05/01/18 05/08/18 05/09/18 05/15/18         TA         Sleeping Positions instructed                                TA Sternum-Up Posture instructed                  TA SI jt. self-correction instructed reviewed  reviewed               TA Lumbar Facet PIC                   TA   Order of Self-Corrections instructed reviewed                 TA Use of lumbar pillow instructed                  TA Use of cervical roll instructed                  TA Use of orthotics instructed                  TA Use of SI belt instructed                  TA Use of Nada chair                   TA Use of Ice                   TA Use of [employer]/ heat                   TA Use of TENS unit                   TA Use of iontophoresis                   TA Decreasing Disc Pressures  instructed                 TA Toe raises at counter       instructed                                                                        NMR Pelvic Floor Isolation  instructed reviewed reviewed               NMR Transverse Abdominus  instructed reviewed reviewed               NMR Multifidus Isolation  instructed reviewed reviewed               NMR InnerUnit against Gravity   instructed reviewed               NMR Sit-stand w/ inner unit    instructed                NMR Roll w/ inner unit   instructed                NMR Walk w/ inner unit    instructed                NMR Up/down steps w/ inner unit   instructed                NMR Water Exer Instruction        instructed           NMR Hip Abd w/o piriformis    instructed reviewed  reviewed            NMR Hip Add w/o iliop/ham     instructed redeview  reviewed            NMR Lower abs in supine     instructed corrected reviewed            NMR Abd obliques in supine     instructed corrected reviewed            NMR Prone Knee Flexion      instructed reviewed            NMR Prone glut vern                   NMR Retro Step       instructed            NMR Retro Walk       instructed            NMR Retro walk on treadmill       instructed            NMR Forward walk w/ inner unit                   NMR Balance Instruction        instructed           NMR Stability Ball A/P & Lateral        instructed           NMR Ball Catch/Throw /Supine        instructed           NMR Ball Catch/Throw /Stand        instructed           NMR StabilityBall All 4's Arm Lift         instructed          NMR StabilityBall Prone Walk Out         instructed          NMR StabilityBall Supine Oblique         instructed          NMR Stability Ball sit-supine-sit         instructed          NMR Walking Prog Progression                   MNR Home program sequencing         instructed                              TA Hamstring stretch      instructed    reviewed         TA Hip Ext Rot Stretch          instructed         TA Hip Int Rot Stretch          instructed         TA Hip Flex/IT Stretch          instructed         TA Hip Add Stretch      instructed    reviewed         TA Lats Dorsi Stretch          instructed         TA Gastroc/soleus stretch                   TA QuadratusLumborm Stretch                      Supine          instructed            Stance          instructed         TA Quad Stretch                                        NMR Wall Push Ups                   NMR Planking                   NMR BOSU Ball Exercises                   NMR Lateral Bridge Drop                   NMR [place]                   NMR [place]dt Lat Pull Down                   NMR O'Feldt Hip Ext                   NMR O'Feldt Trunk Ext                                       TA CervDiscExtSup/stnd                   TA Cerv Stretches                   TA Self PIC Cervical Spine                   TA Neural Gliding                   TA Ant/Mid Scalene Strch                   TA Biceps stretch                   TA Rotator Cuff Stretch                   TA Anterior Chest Stretch                   TA Wrist Ext Stretch                                       NMR Prone Arm Lifts                   NMR Scapula Stabilization                   NMR Occulomotor Isometrics                   NMR Cervical Isometrics                                       TA Shld AROM w/bar                   TA Shld Capsular Stretching                   TA Self PIC Thor Spine                   TA Rot Cuff Therabd, beginner                   TA Rot Cuff Therabd, intermed                                                                                                                                                                                                                                                 Evelyn Velez, PT, MS  Physical Therapist  [place]# [id number]

## 2018-05-17 ENCOUNTER — TREATMENT (OUTPATIENT)
Dept: PHYSICAL THERAPY | Facility: CLINIC | Age: 58
End: 2018-05-17

## 2018-05-17 DIAGNOSIS — R29.3 POSTURE IMBALANCE: ICD-10-CM

## 2018-05-17 DIAGNOSIS — M53.3 SACROILIAC JOINT DYSFUNCTION: Primary | ICD-10-CM

## 2018-05-17 PROCEDURE — 97140 MANUAL THERAPY 1/> REGIONS: CPT | Performed by: PHYSICAL THERAPIST

## 2018-05-17 PROCEDURE — 97112 NEUROMUSCULAR REEDUCATION: CPT | Performed by: PHYSICAL THERAPIST

## 2018-05-17 NOTE — PROGRESS NOTES
"Physical Therapy Note      SUBJECTIVE:   Changes since last seen:  Huma saw Dr. Ochoa today; He gave her script to continue physical therapy for 6 more weeks.   She is taking Tramadol in morning and at night.   Left rhomboid is still a little \"tweaky\".  \"Left quad has essentially not been spasming anymore!\"  \"Today has been a pretty good day\".   She went back to using the Don Kerline brace on her ankle, yesterday and this morning the ankle was feeling a little unstable, \"I don't fully trust it\".   She hasn't really had a chance to do the new stretches much yet.     Current level of pain:    3/10 RIght SI joint   Lowest level of pain since last seen:  2/10  After last visit  Highest level of pain since last seen:   6/10  Last night    Past Medical History:  1.  DDD (degenerative disc disease), lumbar  2.  Cervical spondylosis without myelopathy  3.  Cervicalgia  4.  Left rotator cuff repair May 2012;  Left rotator cuff repair with biceps, Aug 2012;  Right rotator cuff repair,   5.  Infantile idiopathic scoliosis of lumbosacral region  6.  Thoracic spondylosis without myelopathy  7.  Chronic midline low back pain without sciatica  8.  Acute midline low back pain without sciatica  9.  Status post lumbar spinal fusion, L1-S1, 17  10.  Pain in thoracic spine  11.  idiopathic scoliosis, thoracolumbar region  12.  Chronic SI joint pain  13.  Graves Disease  14.  Allergies to grass  15.  Left inguinal hernia repair,   16.  Bilateral septoplasty,    17.  Hx of elevated bp secondary to pain  18.  Left distal tibia/fibula fx,   19.  Left ankle sprain, 2 months ago, currently wearing air cast   20.  Hx of 2 normal vaginal deliveries and 1 , 29,27 and 25 years ago  21.  Last menses,     Patient Goals for Physical Therapy:  To get home exercise program for improved pelvic positioning and to decrease SI joint pain.        OBJECTIVE:   2018 " 05/01/18 -5/08/18 05/09/18 05/15/18 05/17/18       SI Joint Positioning  Right  Left Right  Left Right  Left Right  Left Right  Left Right  Left Right  Left Right  Left Right  Left Right  Left Right  Left Right  Left Right  Left Right  Left Right  Left       Innominate                         Anterior   x   x   x    x  x  x  x    x              sl    x    x              Posterior               x              x               x               x               x             x               x               x    sl               x               x          Sacrum                             Unilateral rotation   x  x               x    x   x   x               x               x               x    x               x                            SI Joint Testing  Right  Left Right  Left Right  Left Right  Left Right  Left Right  Left Right  Left Right  Left Right  Left Right  Left Right  Left Right  Left Right  Left Right  Left Right  Left           Distraction    +           +   +        +   +           +   +          +     +         +   +           +   +           +   +          +   +           +   +           +   +          +               Lashell's    +       slight   +        -   +          -   -          -    -         -   -          -                    Compression     -         -   -        -                        Prone Press Up   No change   -        -                                  Special Tests  Right   Left Right  Left Right  Left Right  Left Right  Left Right  Left Right Left  Right  Left Right  Left Right  Left Right  Left Right  Left Right  Left Right Left  Right  Left      Straight Leg Raise                                  Active    -         -                           Passive   -          -                          Hip Scour Test   sl        -                                                                  Palpation:      Muscle/Bone Irritation  Date 03/27/2018 04/05/18 04/17/18 044/19/18 04/24/18 04/26/18  05/01/18 05/08/18 05/09/18 05/15/18 05/17/18        Right  Left Right  Left Right  Left Right  Left Right  Left Right  Left Right  Left Right  Left Right  Left Right  Left Right  Left Right  Left Right  Left Right  Left Right  Left   Piriformis  ++        +   +         + +         sl   +          +   +            +   +        -  +          sl  ++          -   +        -    +          -  +          -       Gr. Trochanter  +        sl   +        sl   Sl       sl   Sl          sl   Sl        Sl    -           -  -           -  sl            -   Sl      sl    Sl        sl Slight slight       IT Band  -        -   -          -    -           -   Sl        Sl   sl            -  sl         -  -            -   -          -    -            -   -            -       Quadratus Lumborum  sl        sl   sl      sl   -          -   -           -   Sl        sl  ++        -  ++         +  ++           -   ++        -    +           +   +      slight       Ischial Tuberosity  -        -   -            -    -            -   -            -   -           -   -           -   -            -   -            -    -           - -           -       Sacrococcygeal Ligs  +      slight Not tested   -          - Not tested   Sl         -   -         sl   -            -  +            - Not tested   +   Slight   -       Paraspinals  +     sl   +       +    Sl         -  -           -  +           -   +           -   +          -   +           -   +      _       Spinous Processes                                     T-spine rotated to   -           -                        L-spine rotated to  L2-5     -  L1-5 L3-5  L2-5  L2-5  L2-5 Sl L2-5 L1-5       -  L1-5       - L5  L5       Adductor Aden  -          ++  sl          +   sl        +     +         +   +       sl   +        sl Sl        sl   -           -   -          -   -            -       Iliopsoas  slight   ++  sl          +    +        sl  +         +   +          +   +       +   +       +    "Sl         sl   +          +   +         +        Quad Origin slight slight    -         -    -        -   -           -    -         -   -          -  -            -    -           -       SI Joint  Cyst like structure over right SI joint  ++        -    +        sl    +        +   ++       +   +         +      +  +            +      +   +       sl   +        +   +       sl   +        sl   +          -       Pubic Symphysis           ++ Not tested             + No tested                           Rectus Diastasis 2 1/2finger                                   Leg Length:  The left lower extremity is 1/4\" shorter than the right    Monthly Objective Measurement/Functional Activity  Date: 03/27/2018 04/26/18          Oswestry Pain Score 60/100    51/100                               AROM Lumbar Spine: Degrees   Degrees      Degrees      Degrees      Degrees      Degrees      Degrees    Degrees      Flexion 120 pain right        124              Extension 35 pain right         39            Right Lat. Flexion 45        43             Left Lat. Flexion 33        33            Right Lat Shift Pelvis  no change No change             Left Lat Shift Pelvis Inc pain   Inc pain right SI joint                    AROM Hips:  (degrees) Right      Left Right Left Right  Left Right  Left Right  Left Right  Left Right  Left Right  Left      Flexion 125      120  125    125                  Abduction 38       35    45     42              Int. Rotation 26       20   40      35              Ext. Rotation  40       49   45      49                       Left AROM Ankle (degrees) Pre Rx  Post (as of 04/17/18)           Post           dorsiflexion 4          12                      12           plantarflexion       62             65           inversion      30             35           eversion      40             40                         05/15/18        Flexibility:   (degrees) Right    Left Right  Left Right  Left Right  Left Right  " Left Right  Left Right  Left Right  Left      Hamstrings -10       -19  -15      -45            Quadriceps 39       32   60     30            HipExt/Rot(piriformis) 39       46  42       45            Hip Int/Rotators 23       26   25      28            Hip Flexors (iliopsoas) Not tested         Not tested   0            0           IT Band Not tested          Not tested  -7 -12           Lats dorsi   150      161        Reflexes: Right      Left Right  Left Right  Left Right  Left Right  Left Right  Left Right  Left Right  Left      L4 (Quad) -       -             S1 (Achilles) -       -                     Root Level  - Motor Right      Left Right  Left Right  Left Right  Left Right  Left Right  Left Right  Left Right  Left     T12             Rectus Abdominus 4+/5 4+/5 to 5/5            L1              Paraspinals 5/5           5/5 5/5        5/5            L2              Hip Flexion 5/5           5/5 5/5        5/5            L3             Quads Not tested  5/5       5/5            L4              Anterior Tibialis 5/5          5/5 5/5       5/5            L5             Gr. Toe Extension 5/5          5/5 5/5       5/5            S1            Gastroc/Sol/Peroneals 5/5        4+/5 5/5    pain in left ankle                    Functional Strength:             Single LegStanceTime (seconds) R:6 inc   L:1    R:      L: R:      L: R:     L: R:      L: R:      L: R:      L: R:      L:     Pelvic Floor Isolation (seconds) -             Inner Unit  Isolation (seconds) -                     Functional Activities:              Sit w/o pain? Pain increases (minutes) Yes/ 30 min    Yes/ 35-40            Stand w/o pain? Yes/ 30 min Yes/ 45            Walk w/o pain? No/ 15 min Yes/10 min            Steps w/o pain? No/ 1 fl Yes/ 1 fl            Drive w/o pain? No/ 15-30 min Yes/5-10            Work w/o pain? No/ on light duty Yes/ 35-40            # times wakes w/ pain? Only sleeps 1 hr at a time, then wakes with pain  Wakes every 3 hrs.            PLAN OF CARE:    ASSESSMENT:  Problem List:   1. SI joint dysfunction  2. Lack of spinal stabilization  3. Postural dysfunction     Discussion:    Was able to review and correct all of the outer unit neuro motor retraining exercises today. She needs to keep her sternum up when doing the hip abduction and hip adduction to keep her from inappropriately using the rectus abdominus instead of using the transverse abdominus for spinal stabilization.   She was instructed in how to use positional isometric contraction techniques to T10 to eliminate the lower thoracic pinch pain.  She did this effectively  Huma was using her gastroc as a knee flexor in the prone knee flexion exercise, thus brought to her attention and she was able to stop this by dorsiflexing her foot and concentrating on the inner unit.    Very pleased with have more time to work on the exercises given to her to do at home, as she needs much fine tuning to perform correctly and effectively.       Short Term Goals: (4-6 weeks)                                 Date Met:                1.   pt independent in postural correction       04/05/18  2.   pt independent in SI joint self-corrections      04/05/18  3.   pt independent in exer to decrease post. disc pressures    04/17/18  4.   pt able to sleep through night without pain  5.   pt able to sit 40 minutes without pain       05/08/18  6.   pt able to walk 20 minutes without pain  7.   Reduce pt pain to no greater than 3/10   8.   Decrease Oswestry Pain Score to no greater than 50/100  9.  Pt able to isolate the inner unit without gravity      04/19/18  10.  Pt able to engage the inner unit against gravity and hold 10 sec   04/19/18  11.  Pt able to perform hip abd without piriformis x6     04/24/18  12.  Pt able to perform hip add without engaging iliopsoas x6    04/24/18  13.  Pt able to perform lower abs x6 without pelvic motion    04/26/18  14.  Pt able to perform bent knee  fall out x6 without engaging the piriformis  05/01/18  15.  Pt able to do prone knee flexion to 45 degrees, bilaterally, w/o pelvic motion, x6 05/08/18  16.  Pt able to bridge x6 w/ inner unit and no piriformis     05/17/18  17.  Pt able to retro step x6 without piriformis       05/17/18  18.  Pt able to throw and catch stability ball in supine with inner unit on x6   19.  Pt able to move laterally and in AP direction without pain x6    05/09/18  20.  Pt able to perform prone walk out maintaining inner unit, x6  21.  Pt able to perform all 4s arm lift without pain x6  22.  Pt able to perform supine obliques on ball x6 without engaging QLs      Long Term Goals:(3-5 months)        Date Met:      1.  pt independent in self-management of symptoms       2.  pt independent in home program      3.  pt able to work 6 hours without pain      4.  pt able to sit 60 minutes without pain      5.  pt able to walk 45 min without pain    Progress Towards Goals:  As expected    Treatment Plan:   1.  Ultrasound to increase extensibility, decrease pain, if needed  2.  Electrical stimulation for muscle relaxation, decrease pain, if needed, iontophoresis  3.  Manual therapy to increase function, decrease pain  4.  Therapeutic exercise to increase function, decrease pain  5.  Home programming and d/c planning to increase function and decrease pain    Frequency & Duration:  Pt will be seen on a once/week basis for  8 more visits    Patient Participated in and Agrees With This Plan of Care and Goals:  YES  Rehab Potential:  hugo Velez, PT, MS  Physical Therapist  [place]# [id number]      TREATMENT TODAY:    Total Treatment Time: (time in clinic)        65   min  Timed Code Treatment Minutes:      65      Supplies given:     Manual Therapy:  (MA)  RX min:  30  Manual posterior rotation of right innominate    Positional Isometric contraction (PIC) of left iliopsoas    Positional Isometric contraction of (PIC) gluteus minimus     Distraction of both SI jts in open pack hip position  Piriformis stretching, bilaterally    Quadratus lumborum stretching, bilaterally    Anterior/Inferior Mobilization of  right hip    Positional Isometric Contraction of multifidus  Iliopsoas release  Iliopsoas stretching  Myofascial work, trunk  Manual axial distraction  PIC T10        Therapeutic Activities:  (TA)  RX min:  5    Neuromuscular Reeducation: (NMR)  RX min:   30    Ultrasound:  RX min:            1.6 vogel/cm2                            Location:     Iontophoresis:  6 hr patch                                Location:                               Procedures:      Key:  i=instructed        r=review        c=corrected        a=adapted   Code Procedure/Instruction 03/27/2018 04/05/18 04/17/18 04/19/18 04/24/18 04/26/18 05/01/18 05/08/18 05/09/18 05/15/18 05/17/18        TA         Sleeping Positions instructed                                TA Sternum-Up Posture instructed                  TA SI jt. self-correction instructed reviewed  reviewed               TA Lumbar Facet PIC                   TA   Order of Self-Corrections instructed reviewed                 TA Use of lumbar pillow instructed                  TA Use of cervical roll instructed                  TA Use of orthotics instructed                  TA Use of SI belt instructed                  TA Use of Nada chair                   TA Use of Ice                   TA Use of [employer]/ heat                   TA Use of TENS unit                   TA Use of iontophoresis                   TA Decreasing Disc Pressures  instructed                 TA Toe raises at counter       instructed                                                                        NMR Pelvic Floor Isolation  instructed reviewed reviewed               NMR Transverse Abdominus  instructed reviewed reviewed               NMR Multifidus Isolation  instructed reviewed reviewed               NMR InnerUnit against Gravity    instructed reviewed               NMR Sit-stand w/ inner unit   instructed                NMR Roll w/ inner unit   instructed                NMR Walk w/ inner unit    instructed                NMR Up/down steps w/ inner unit   instructed                NMR Water Exer Instruction        instructed           NMR Hip Abd w/o piriformis    instructed reviewed  reviewed    corrected        NMR Hip Add w/o iliop/ham     instructed redeview  reviewed    corrected        NMR Lower abs in supine     instructed corrected reviewed    corrected        NMR Abd obliques in supine     instructed corrected reviewed    corrected        NMR Prone Knee Flexion      instructed reviewed    corrected        NMR Prone glut vern                   NMR Retro Step       instructed            NMR Retro Walk       instructed            NMR Retro walk on treadmill       instructed            NMR Forward walk w/ inner unit                   NMR Balance Instruction        instructed           NMR Stability Ball A/P & Lateral        instructed           NMR Ball Catch/Throw /Supine        instructed           NMR Ball Catch/Throw /Stand        instructed           NMR StabilityBall All 4's Arm Lift         instructed          NMR StabilityBall Prone Walk Out         instructed          NMR StabilityBall Supine Oblique         instructed          NMR Stability Ball sit-supine-sit         instructed          NMR Walking Prog Progression                   MNR Home program sequencing         instructed                              TA Hamstring stretch      instructed    reviewed         TA Hip Ext Rot Stretch          instructed         TA Hip Int Rot Stretch          instructed         TA Hip Flex/IT Stretch          instructed         TA Hip Add Stretch      instructed    reviewed         TA Lats Dorsi Stretch          instructed         TA Gastroc/soleus stretch                   TA QuadratusLumborm Stretch                      Supine           instructed            Stance          instructed         TA Quad Stretch                                       NMR Wall Push Ups                   NMR Planking                   NMR BOSU Ball Exercises                   NMR Lateral Bridge Drop                   NMR [place]                   NMR O'Feldt Lat Pull Down                   NMR O'Feldt Hip Ext                   NMR O'Feldt Trunk Ext                                       TA CervDiscExtSup/stnd                   TA Cerv Stretches                   TA Self PIC Cervical Spine                   TA Neural Gliding                   TA Ant/Mid Scalene Strch                   TA Biceps stretch                   TA Rotator Cuff Stretch                   TA Anterior Chest Stretch                   TA Wrist Ext Stretch                                       NMR Prone Arm Lifts                   NMR Scapula Stabilization                   NMR Occulomotor Isometrics                   NMR Cervical Isometrics                                       TA Shld AROM w/bar                   TA Shld Capsular Stretching                   TA Self PIC Thor Spine           instructed        TA Rot Cuff Therabd, beginner                   TA Rot Cuff Therabd, intermed                                                                                                                                                                                                                                                 Evelyn Velez, PT, MS  Physical Therapist  [place]# [id number]

## 2018-05-22 ENCOUNTER — TREATMENT (OUTPATIENT)
Dept: PHYSICAL THERAPY | Facility: CLINIC | Age: 58
End: 2018-05-22

## 2018-05-22 DIAGNOSIS — R29.3 POSTURE IMBALANCE: ICD-10-CM

## 2018-05-22 DIAGNOSIS — M53.3 SACROILIAC JOINT DYSFUNCTION: Primary | ICD-10-CM

## 2018-05-22 PROCEDURE — 97530 THERAPEUTIC ACTIVITIES: CPT | Performed by: PHYSICAL THERAPIST

## 2018-05-22 PROCEDURE — 97140 MANUAL THERAPY 1/> REGIONS: CPT | Performed by: PHYSICAL THERAPIST

## 2018-05-22 NOTE — PROGRESS NOTES
"Physical Therapy Note      SUBJECTIVE:   Changes since last seen:  \"Doing well, a much better weekend.\"  \"I'm a little achey today.\"  \"Workers comp has scheduled me for an FCE tomorrow.\"   \"I had one episode of left quad spasming since last here.\"  Huma ikes the new stretches.  \"I was so busy at work this am I didn't get to do my exercises yet today.\"  \"I only woke up once last night! A new record.\"     Current level of pain:    4-5/10 RIght SI joint   Lowest level of pain since last seen:  2/10  After last visit  Highest level of pain since last seen:   6/10  At night    Past Medical History:  1.  DDD (degenerative disc disease), lumbar  2.  Cervical spondylosis without myelopathy  3.  Cervicalgia  4.  Left rotator cuff repair May 2012;  Left rotator cuff repair with biceps, Aug 2012;  Right rotator cuff repair,   5.  Infantile idiopathic scoliosis of lumbosacral region  6.  Thoracic spondylosis without myelopathy  7.  Chronic midline low back pain without sciatica  8.  Acute midline low back pain without sciatica  9.  Status post lumbar spinal fusion, L1-S1, 17  10.  Pain in thoracic spine  11.  idiopathic scoliosis, thoracolumbar region  12.  Chronic SI joint pain  13.  Graves Disease  14.  Allergies to grass  15.  Left inguinal hernia repair,   16.  Bilateral septoplasty,    17.  Hx of elevated bp secondary to pain  18.  Left distal tibia/fibula fx,   19.  Left ankle sprain, 2 months ago, currently wearing air cast   20.  Hx of 2 normal vaginal deliveries and 1 , 29,27 and 25 years ago  21.  Last menses,     Patient Goals for Physical Therapy:  To get home exercise program for improved pelvic positioning and to decrease SI joint pain.        OBJECTIVE:   2018 -5/08/18 05/09/18 05/15/18 05/17/18 05/22/18      SI Joint Positioning  Right  Left Right  Left Right  Left Right  Left Right  Left Right  Left Right  Left " Right  Left Right  Left Right  Left Right  Left Right  Left Right  Left Right  Left Right  Left       Innominate                         Anterior   x   x   x    x  x  x  x    x              sl    x    x  x             Posterior               x              x               x               x               x             x               x               x    sl               x               x               x         Sacrum                             Unilateral rotation   x  x               x    x   x   x               x               x               x    x               x   x                           SI Joint Testing  Right  Left Right  Left Right  Left Right  Left Right  Left Right  Left Right  Left Right  Left Right  Left Right  Left Right  Left Right  Left Right  Left Right  Left Right  Left           Distraction    +           +   +        +   +           +   +          +     +         +   +           +   +           +   +          +   +           +   +           +   +          +   +           +              Lashell's    +       slight   +        -   +          -   -          -    -         -   -          -                    Compression     -         -   -        -                        Prone Press Up   No change   -        -                                  Special Tests  Right   Left Right  Left Right  Left Right  Left Right  Left Right  Left Right Left  Right  Left Right  Left Right  Left Right  Left Right  Left Right  Left Right Left  Right  Left      Straight Leg Raise                                  Active    -         -                           Passive   -          -                          Hip Scour Test   sl        -                                                                  Palpation:      Muscle/Bone Irritation  Date 03/27/2018 04/05/18 04/17/18 044/19/18 04/24/18 04/26/18 05/01/18 05/08/18 05/09/18 05/15/18 05/17/18 05/22/18       Right  Left Right  Left Right  Left Right  Left Right  Left  Right  Left Right  Left Right  Left Right  Left Right  Left Right  Left Right  Left Right  Left Right  Left Right  Left   Piriformis  ++        +   +         + +         sl   +          +   +            +   +        -  +          sl  ++          -   +        -    +          -  +          -   Sl         -      Gr. Trochanter  +        sl   +        sl   Sl       sl   Sl          sl   Sl        Sl    -           -  -           -  sl            -   Sl      sl    Sl        sl Slight slight  -         -      IT Band  -        -   -          -    -           -   Sl        Sl   sl            -  sl         -  -            -   -          -    -            -   -            -   -           -      Quadratus Lumborum  sl        sl   sl      sl   -          -   -           -   Sl        sl  ++        -  ++         +  ++           -   ++        -    +           +   +      slight    +        +      Ischial Tuberosity  -        -   -            -    -            -   -            -   -           -   -           -   -            -   -            -    -           - -           -    Sl        -      Sacrococcygeal Ligs  +      slight Not tested   -          - Not tested   Sl         -   -         sl   -            -  +            - Not tested   +   Slight   -   sl      Paraspinals  +     sl   +       +    Sl         -  -           -  +           -   +           -   +          -   +           -   +      _       Spinous Processes                                     T-spine rotated to   -           -                        L-spine rotated to  L2-5     -  L1-5 L3-5  L2-5  L2-5  L2-5 Sl L2-5 L1-5       -  L1-5       - L5  L5 L1-5      Adductor Aden  -          ++  sl          +   sl        +     +         +   +       sl   +        sl Sl        sl   -           -   -          -   -            -   -           -      Iliopsoas  slight   ++  sl          +    +        sl  +         +   +          +   +       +   +       +   Sl         sl   +     "      +   +         +   Slight slight      Quad Origin slight slight    -         -    -        -   -           -    -         -   -          -  -            -    -           -   -         -      SI Joint  Cyst like structure over right SI joint  ++        -    +        sl    +        +   ++       +   +         +      +  +            +      +   +       sl   +        +   +       sl   +        sl   +          -   +        -      Pubic Symphysis           ++ Not tested             + No tested                           Rectus Diastasis 2 1/2finger                                   Leg Length:  The left lower extremity is 1/4\" shorter than the right    Monthly Objective Measurement/Functional Activity  Date: 03/27/2018 04/26/18          Oswestry Pain Score 60/100    51/100                               AROM Lumbar Spine: Degrees   Degrees      Degrees      Degrees      Degrees      Degrees      Degrees    Degrees      Flexion 120 pain right        124              Extension 35 pain right         39            Right Lat. Flexion 45        43             Left Lat. Flexion 33        33            Right Lat Shift Pelvis  no change No change             Left Lat Shift Pelvis Inc pain   Inc pain right SI joint                    AROM Hips:  (degrees) Right      Left Right Left Right  Left Right  Left Right  Left Right  Left Right  Left Right  Left      Flexion 125      120  125    125                  Abduction 38       35    45     42              Int. Rotation 26       20   40      35              Ext. Rotation  40       49   45      49                       Left AROM Ankle (degrees) Pre Rx  Post (as of 04/17/18)           Post           dorsiflexion 4          12                      12           plantarflexion       62             65           inversion      30             35           eversion      40             40                         05/15/18        Flexibility:   (degrees) Right    Left Right  Left Right  Left " Right  Left Right  Left Right  Left Right  Left Right  Left      Hamstrings -10       -19  -15      -45            Quadriceps 39       32   60     30            HipExt/Rot(piriformis) 39       46  42       45            Hip Int/Rotators 23       26   25      28            Hip Flexors (iliopsoas) Not tested         Not tested   0            0           IT Band Not tested          Not tested  -7 -12           Lats dorsi   150      161        Reflexes: Right      Left Right  Left Right  Left Right  Left Right  Left Right  Left Right  Left Right  Left      L4 (Quad) -       -             S1 (Achilles) -       -                     Root Level  - Motor Right      Left Right  Left Right  Left Right  Left Right  Left Right  Left Right  Left Right  Left     T12             Rectus Abdominus 4+/5 4+/5 to 5/5            L1              Paraspinals 5/5           5/5 5/5        5/5            L2              Hip Flexion 5/5           5/5 5/5        5/5            L3             Quads Not tested  5/5       5/5            L4              Anterior Tibialis 5/5          5/5 5/5       5/5            L5             Gr. Toe Extension 5/5          5/5 5/5       5/5            S1            Gastroc/Sol/Peroneals 5/5        4+/5 5/5    pain in left ankle                    Functional Strength:             Single LegStanceTime (seconds) R:6 inc   L:1    R:      L: R:      L: R:     L: R:      L: R:      L: R:      L: R:      L:     Pelvic Floor Isolation (seconds) -             Inner Unit  Isolation (seconds) -                     Functional Activities:              Sit w/o pain? Pain increases (minutes) Yes/ 30 min    Yes/ 35-40            Stand w/o pain? Yes/ 30 min Yes/ 45            Walk w/o pain? No/ 15 min Yes/10 min            Steps w/o pain? No/ 1 fl Yes/ 1 fl            Drive w/o pain? No/ 15-30 min Yes/5-10            Work w/o pain? No/ on light duty Yes/ 35-40            # times wakes w/ pain? Only sleeps 1 hr at a time,  then wakes with pain Wakes every 3 hrs.            PLAN OF CARE:    ASSESSMENT:  Problem List:   1. SI joint dysfunction  2. Lack of spinal stabilization  3. Postural dysfunction     Discussion:    Performed new upine and sidelying bent table stretching today for the QLs, iliopsoas and quads. This was very effective in stopping the severe spasm she got here in the clinic today when she rolled from supine to prone. Was surprising to witness the spasming of the quad when she had just reported this had improved.  She notes since it was better she had stopped using the Theraworx Relief.  She will resume use of it.   Instructed in self PIC in supine for the left iliopsoas to use when the self corrections given thus far just don't get rid of the pain entirely.   Reviewed the stretches for quad,QLs, hamstrings and hip internal rotators.   The swelling in the left ankle continues to decrease.       Short Term Goals: (4-6 weeks)                                 Date Met:                1.   pt independent in postural correction       04/05/18  2.   pt independent in SI joint self-corrections      04/05/18  3.   pt independent in exer to decrease post. disc pressures    04/17/18  4.   pt able to sleep through night without pain  5.   pt able to sit 40 minutes without pain       05/08/18  6.   pt able to walk 20 minutes without pain  7.   Reduce pt pain to no greater than 3/10   8.   Decrease Oswestry Pain Score to no greater than 50/100  9.  Pt able to isolate the inner unit without gravity      04/19/18  10.  Pt able to engage the inner unit against gravity and hold 10 sec   04/19/18  11.  Pt able to perform hip abd without piriformis x6     04/24/18  12.  Pt able to perform hip add without engaging iliopsoas x6    04/24/18  13.  Pt able to perform lower abs x6 without pelvic motion    04/26/18  14.  Pt able to perform bent knee fall out x6 without engaging the piriformis  05/01/18  15.  Pt able to do prone knee flexion to 45  degrees, bilaterally, w/o pelvic motion, x6 05/08/18  16.  Pt able to bridge x6 w/ inner unit and no piriformis     05/17/18  17.  Pt able to retro step x6 without piriformis       05/17/18  18.  Pt able to throw and catch stability ball in supine with inner unit on x6   19.  Pt able to move laterally and in AP direction without pain x6    05/09/18  20.  Pt able to perform prone walk out maintaining inner unit, x6  21.  Pt able to perform all 4s arm lift without pain x6  22.  Pt able to perform supine obliques on ball x6 without engaging QLs  05/22/18      Long Term Goals:(3-5 months)        Date Met:      1.  pt independent in self-management of symptoms       2.  pt independent in home program      3.  pt able to work 6 hours without pain      4.  pt able to sit 60 minutes without pain      5.  pt able to walk 45 min without pain    Progress Towards Goals:  As expected    Treatment Plan:   1.  Ultrasound to increase extensibility, decrease pain, if needed  2.  Electrical stimulation for muscle relaxation, decrease pain, if needed, iontophoresis  3.  Manual therapy to increase function, decrease pain  4.  Therapeutic exercise to increase function, decrease pain  5.  Home programming and d/c planning to increase function and decrease pain    Frequency & Duration:  Pt will be seen on a once/week basis for  7 more visits    Patient Participated in and Agrees With This Plan of Care and Goals:  YES  Rehab Potential:  hugo Velez, PT, MS  Physical Therapist  [place]# [id number]      TREATMENT TODAY:    Total Treatment Time: (time in clinic)       60 min  Timed Code Treatment Minutes:      60      Supplies given:     Manual Therapy:  (MA)  RX min:  45  Manual posterior rotation of right innominate    Positional Isometric contraction (PIC) of left iliopsoas    Positional Isometric contraction of (PIC) gluteus minimus    Distraction of both SI jts in open pack hip position  Piriformis stretching, bilaterally     Quadratus lumborum stretching, bilaterally    Anterior/Inferior Mobilization of  right hip    Positional Isometric Contraction of multifidus  Iliopsoas release  Iliopsoas stretching  Myofascial work, trunk  Manual axial distraction  PIC T10  Ankle mobs        Therapeutic Activities:  (TA)  RX min: 15    Neuromuscular Reeducation: (NMR)  RX min:       Ultrasound:  RX min:            1.6 vogel/cm2                            Location:     Iontophoresis:  6 hr patch                                Location:                               Procedures:      Key:  i=instructed        r=review        c=corrected        a=adapted   Code Procedure/Instruction 03/27/2018 04/05/18 04/17/18 04/19/18 04/24/18 04/26/18 05/01/18 05/08/18 05/09/18 05/15/18 05/17/18 05/22/18       TA         Sleeping Positions instructed                                TA Sternum-Up Posture instructed                  TA SI jt. self-correction instructed reviewed  reviewed               TA Lumbar Facet PIC                   TA   Order of Self-Corrections instructed reviewed                 TA Use of lumbar pillow instructed                  TA Use of cervical roll instructed                  TA Use of orthotics instructed                  TA Use of SI belt instructed                  TA Use of Nada chair                   TA Use of Ice                   TA Use of [employer]/ heat                   TA Use of TENS unit                   TA Use of iontophoresis                   TA Decreasing Disc Pressures  instructed                 TA Toe raises at counter       instructed                                                                        NMR Pelvic Floor Isolation  instructed reviewed reviewed               NMR Transverse Abdominus  instructed reviewed reviewed               NMR Multifidus Isolation  instructed reviewed reviewed               NMR InnerUnit against Gravity   instructed reviewed               NMR Sit-stand w/ inner unit    instructed                NMR Roll w/ inner unit   instructed                NMR Walk w/ inner unit    instructed                NMR Up/down steps w/ inner unit   instructed                NMR Water Exer Instruction        instructed           NMR Hip Abd w/o piriformis    instructed reviewed  reviewed    corrected        NMR Hip Add w/o iliop/ham     instructed redeview  reviewed    corrected        NMR Lower abs in supine     instructed corrected reviewed    corrected        NMR Abd obliques in supine     instructed corrected reviewed    corrected        NMR Prone Knee Flexion      instructed reviewed    corrected        NMR Prone glut vern                   NMR Retro Step       instructed            NMR Retro Walk       instructed            NMR Retro walk on treadmill       instructed            NMR Forward walk w/ inner unit                   NMR Balance Instruction        instructed           NMR Stability Ball A/P & Lateral        instructed           NMR Ball Catch/Throw /Supine        instructed           NMR Ball Catch/Throw /Stand        instructed           NMR StabilityBall All 4's Arm Lift         instructed          NMR StabilityBall Prone Walk Out         instructed          NMR StabilityBall Supine Oblique         instructed          NMR Stability Ball sit-supine-sit         instructed          NMR Walking Prog Progression                   MNR Home program sequencing         instructed                              TA Hamstring stretch      instructed    reviewed  corrected       TA Hip Ext Rot Stretch          instructed  reviewed       TA Hip Int Rot Stretch          instructed  corrected       TA Hip Flex/IT Stretch          instructed         TA Hip Add Stretch      instructed    reviewed  corrected       TA Lats Dorsi Stretch          instructed         TA Gastroc/soleus stretch                   TA QuadratusLumborm Stretch                      Supine          instructed  corrected           Stance          instructed         TA Quad Stretch          instructed  reviewed                           NMR Wall Push Ups                   NMR Planking                   NMR BOSU Ball Exercises                   NMR Lateral Bridge Drop                   NMR [place]                   NMR O'Feldt Lat Pull Down                   NMR O'Feldt Hip Ext                   NMR O'Feldt Trunk Ext                                       TA CervDiscExtSup/stnd                   TA Cerv Stretches                   TA Self PIC Cervical Spine                   TA Neural Gliding                   TA Ant/Mid Scalene Strch                   TA Biceps stretch                   TA Rotator Cuff Stretch                   TA Anterior Chest Stretch                   TA Wrist Ext Stretch                                       NMR Prone Arm Lifts                   NMR Scapula Stabilization                   NMR Occulomotor Isometrics                   NMR Cervical Isometrics                                       TA Shld AROM w/bar                   TA Shld Capsular Stretching                   TA Self PIC Thor Spine           instructed        TA Rot Cuff Therabd, beginner                   TA Rot Cuff Therabd, intermed                                                                                                                                                                                                                                                 Evelyn Velez, PT, MS  Physical Therapist  [place]# [id number]

## 2018-06-06 ENCOUNTER — PATIENT MESSAGE (OUTPATIENT)
Dept: NEUROSURGERY | Facility: CLINIC | Age: 58
End: 2018-06-06

## 2018-06-06 ENCOUNTER — TREATMENT (OUTPATIENT)
Dept: PHYSICAL THERAPY | Facility: CLINIC | Age: 58
End: 2018-06-06

## 2018-06-06 DIAGNOSIS — M54.50 CHRONIC MIDLINE LOW BACK PAIN WITHOUT SCIATICA: ICD-10-CM

## 2018-06-06 DIAGNOSIS — M41.25 OTHER IDIOPATHIC SCOLIOSIS, THORACOLUMBAR REGION: ICD-10-CM

## 2018-06-06 DIAGNOSIS — R29.3 POSTURE IMBALANCE: ICD-10-CM

## 2018-06-06 DIAGNOSIS — G89.29 CHRONIC BILATERAL LOW BACK PAIN WITH RIGHT-SIDED SCIATICA: ICD-10-CM

## 2018-06-06 DIAGNOSIS — M41.07 INFANTILE IDIOPATHIC SCOLIOSIS OF LUMBOSACRAL REGION: ICD-10-CM
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
## 2018-06-06 DIAGNOSIS — M54.50 ACUTE MIDLINE LOW BACK PAIN WITHOUT SCIATICA: ICD-10-CM

## 2018-06-06 DIAGNOSIS — M54.41 CHRONIC BILATERAL LOW BACK PAIN WITH RIGHT-SIDED SCIATICA: ICD-10-CM

## 2018-06-06 DIAGNOSIS — M47.812 CERVICAL SPONDYLOSIS WITHOUT MYELOPATHY: ICD-10-CM

## 2018-06-06 DIAGNOSIS — M53.3 SACROILIAC JOINT DYSFUNCTION: Primary | ICD-10-CM

## 2018-06-06 DIAGNOSIS — M54.6 PAIN IN THORACIC SPINE: ICD-10-CM

## 2018-06-06 DIAGNOSIS — M47.814 THORACIC SPONDYLOSIS WITHOUT MYELOPATHY: ICD-10-CM

## 2018-06-06 DIAGNOSIS — M54.2 CERVICALGIA: ICD-10-CM

## 2018-06-06 DIAGNOSIS — G89.29 CHRONIC MIDLINE LOW BACK PAIN WITHOUT SCIATICA: ICD-10-CM

## 2018-06-06 DIAGNOSIS — Z98.1 STATUS POST LUMBAR SPINAL FUSION: ICD-10-CM

## 2018-06-06 PROCEDURE — 97001 PR PHYS THERAPY EVALUATION: CPT | Performed by: PHYSICAL THERAPIST

## 2018-06-06 RX ORDER — TRAMADOL HYDROCHLORIDE 50 MG/1
50 TABLET ORAL EVERY 6 HOURS PRN
Qty: 60 TABLET | Refills: 2 | OUTPATIENT
Start: 2018-06-06

## 2018-06-06 NOTE — TELEPHONE ENCOUNTER
Provider:  John  Caller: Huma via [email or web address]  Phone #:  [place]  Surgery:  TLIF  Surgery Date:  4/4/17  Last visit:   5/4/18  Next visit: n/a  NIKOLE:       04/16/2018 Tramadol Hcl Er 300MG 1960 30 30 Sally Broderick [facility] [address] [place] 30 2    04/26/2018 Tramadol Hcl 50MG 1960 60 30 [facility] [place] 10 1    05/17/2018 Tramadol Hcl Er 300MG 1960 30 30 [facility] [address] 30 2  Reason for call:             ----- Message from Huma Montano sent at 6/6/2018 12:19 PM EDT -----  Regarding: Prescription Question  Contact: [phone]  Dr. Lopez,  This is in regard to my Tramadol 50mg q 12 hrs PRN, you were filling for me, then in March-May I asked the worker's comp MD to write the rx so the cost would be covered.  As of Mon they will no longer fill it. The MD says the pharmacy refuses to fill it. The pharmacy says the MD cancelled it. After taking it intermittently since last yr, I'm left without the add'l  50mg of Tramadol for break thru pain. The timing  is bad since I was required to complete a 4 hr FCE Mon & lifted 35lbs, 24 reps in an hr.  I'm still very sore. I start work conditioning 2hrs 3xwk today and the RX would assist in successful outcome to return to work. Would you be able to manage the RX again? Not sure why I thought WC would be reliable. Thank you, Oneyda

## 2018-06-06 NOTE — TELEPHONE ENCOUNTER
We will cover this for a couple of months, but have put in a referral to pain management, if she feels she will need this long term.

## 2018-06-06 NOTE — PROGRESS NOTES
Physical Therapy Initial Evaluation and Plan of Care      Patient: Huma Montano   : 1960  Diagnosis/ICD-10 Code:  Sacroiliac joint dysfunction [M53.3]  Referring practitioner: Gonzalez Ochoa MD  Date of Initial Visit: 2018  Today's Date: 2018  Patient seen for 13 sessions             Subjective Evaluation    History of Present Illness  Date of onset: 2017  Mechanism of injury: Pt was walking down a ramp from a patients house and her feet slipped out from under her and she fell straight down onto her buttocks and low back, mostly on the right side. She walked to her car and called her office to report the incident and then a few days later she decided to go ahead and have it checked out. She started PT at [facility] and it was getting worse. She went to PT for a couple of months and then eventually had several SI and facet blocks. She had some temporary relief from the blocks but got the most relief from PT with Evelyn Walker.       Pt had previously had a Lumbar fusion from L1-S1 in 2017; prior to the fall      Patient Occupation: Physical Therapist Assistant - driving, getting in and of car, pt transfers, exercising with patients (assisting and stabilizing), squatting, kneeling   Precautions and Work Restrictions: pt is currently doing clerical work at the office. She is not seeing patients.  -- Pt is most concerned about being able to sit for a prolonged period of time while driving. Quality of life: good    Pain  Current pain ratin  At best pain ratin  At worst pain ratin  Location: general soreness in the SI and low back. Pt feels that some pain/soreness is from repeated lifting during the FCE  Quality: dull ache  Relieving factors: rest and change in position  Aggravating factors: lifting and squatting (prolonged sitting)  Progression: improved    Patient Goals  Patient goals for therapy: decreased pain, increased strength and return to work             Objective        Active Range of Motion     Additional Active Range of Motion Details  Trunk AROM:  Flexion = 100% of expected  Extension = 75% of expected  Side bending B = 75% of expected     Strength/Myotome Testing     Left Hip   Planes of Motion   Flexion: 4  Extension: 4-  Abduction: 4-    Right Hip   Planes of Motion   Flexion: 4  Extension: 4-  Abduction: 4-    Tests     Additional Tests Details  Supine leg length test + for ant rotation of right innominate    Functional Assessment     Comments  Pt demonstrated good form with 17# box lift in the clinic. She had equal weight shift/weight bearing on faheem LE's and performed the lift safely         Assessment & Plan     Assessment  Impairments: abnormal muscle tone, abnormal or restricted ROM, activity intolerance, impaired physical strength, lacks appropriate home exercise program and pain with function  Assessment details: Patient is a 57 year old female who comes to physical therapy for work conditioning following a fall at work and rehab for low back/SI pain. Pt will benefit from skilled PT services to facilitate a safe return to work full duty.     Prognosis details:   Goals - 6 weeks:    1. Pt will tolerate at least 2 hours continous activity in the clinic without exacerbation of her symptoms.   2. Pt will demonstrate a safe lift of 30# x10 without pain  3. Pt to demonstrate a max assist patients transfer from mat to chair   4. Pt to ambulate up/down 8 flights of stairs without exacerbation of symptoms   Functional Limitations: carrying objects, lifting, pulling, pushing, uncomfortable because of pain, sitting and unable to perform repetitive tasks  Plan  Therapy options: will be seen for skilled physical therapy services  Planned modality interventions: cryotherapy, high voltage pulsed current (pain management), iontophoresis, microcurrent electrical stimulation, TENS, thermotherapy (hydrocollator packs), ultrasound and traction  Planned therapy interventions: ADL  retraining, body mechanics training, flexibility, functional ROM exercises, home exercise program, IADL retraining, joint mobilization, manual therapy, motor coordination training, neuromuscular re-education, postural training, soft tissue mobilization, spinal/joint mobilization, strengthening, stretching and abdominal trunk stabilization  Frequency: 2x week  Duration in weeks: 6  Treatment plan discussed with: patient        evlauation only     PT SIGNATURE: Manoj Martini, PT   DATE TREATMENT INITIATED: 6/6/2018    Initial Certification  Certification Period: 9/4/2018  I certify that the therapy services are furnished while this patient is under my care.  The services outlined above are required by this patient, and will be reviewed every 90 days.     PHYSICIAN: Gonzalez Ochoa MD      DATE:     Please sign and return via fax to [phone].. Thank you, [facility].

## 2018-06-08 ENCOUNTER — TREATMENT (OUTPATIENT)
Dept: PHYSICAL THERAPY | Facility: CLINIC | Age: 58
End: 2018-06-08

## 2018-06-08 DIAGNOSIS — R29.3 POSTURE IMBALANCE: ICD-10-CM

## 2018-06-08 DIAGNOSIS — M53.3 SACROILIAC JOINT DYSFUNCTION: Primary | ICD-10-CM

## 2018-06-08 PROCEDURE — 97545 WORK HARDENING: CPT | Performed by: PHYSICAL THERAPIST

## 2018-06-11 ENCOUNTER — TREATMENT (OUTPATIENT)
Dept: PHYSICAL THERAPY | Facility: CLINIC | Age: 58
End: 2018-06-11

## 2018-06-11 DIAGNOSIS — M53.3 SACROILIAC JOINT DYSFUNCTION: Primary | ICD-10-CM

## 2018-06-11 PROCEDURE — 97545 WORK HARDENING: CPT | Performed by: PHYSICAL THERAPIST

## 2018-06-11 NOTE — PROGRESS NOTES
Physical Therapy Daily Progress Note    Subjective   Huma Montano reports that she is still slightly sore after her FCE but she doesn't have any exacerbation of her SI symptoms.     Objective   See Exercise, Manual, and Modality Logs for complete treatment.       Assessment/Plan     Pt did well with therapy today and did not report any exacerbation of symptoms. She did require some rest breaks during the session.     Progress per Plan of Care and Progress strengthening /stabilization /functional activity           Work conditioning  120 min     Timed Treatment:   120   mins   Total Treatment:     125   mins    Manoj Martini, PT  Physical Therapist

## 2018-06-12 NOTE — PROGRESS NOTES
Physical Therapy Daily Progress Note    Subjective   Huma Montano reports that she is feeling a little discomfort in the SI joint today and she was a little sore after her previous visit. She feels like some of the soreness is still due to the FCE that she did early last week.     Objective   See Exercise, Manual, and Modality Logs for complete treatment.       Assessment/Plan     Performed and SI correction with the patient prior to treatment and she reported feeling some decrease in her pain intensity. Pt had some soreness and fatigue in the clinic today but she was still able to perform all exercises.     Progress per Plan of Care and Progress strengthening /stabilization /functional activity       Work conditioning 118    Timed Treatment:   118   mins   Total Treatment:     126   mins    Manoj Martini, PT  Physical Therapist

## 2018-06-13 ENCOUNTER — TREATMENT (OUTPATIENT)
Dept: PHYSICAL THERAPY | Facility: CLINIC | Age: 58
End: 2018-06-13

## 2018-06-13 DIAGNOSIS — M53.3 SACROILIAC JOINT DYSFUNCTION: Primary | ICD-10-CM

## 2018-06-13 PROCEDURE — 97545 WORK HARDENING: CPT | Performed by: PHYSICAL THERAPIST

## 2018-06-15 NOTE — PROGRESS NOTES
Physical Therapy Daily Progress Note    Subjective   Huma Montano reports that she feels better when she does the therapy. She has some occasional discomfort but she thinks this is mostly because of muscle soreness.     Objective   See Exercise, Manual, and Modality Logs for complete treatment.       Assessment/Plan     Pt is progressing well with work conditioning and she is able to do more exercise in the clinic without requiring a rest break. Will continue to progress with dynamic and functional activity in the clinic as tolerated.         Progress per Plan of Care and Progress strengthening /stabilization /functional activity           Manual Therapy:         mins  21370;  Therapeutic Exercise:    126     mins  79753;     Neuromuscular Meir:        mins  77887;    Therapeutic Activity:          mins  95146;     Gait Training:           mins  71624;     Ultrasound:          mins  10531;    Electrical Stimulation:         mins  65996 ( );  Dry Needling          mins self-pay    Timed Treatment:   126   mins   Total Treatment:     130   mins    Manoj Martini PT  Physical Therapist

## 2018-06-18 ENCOUNTER — TREATMENT (OUTPATIENT)
Dept: PHYSICAL THERAPY | Facility: CLINIC | Age: 58
End: 2018-06-18

## 2018-06-18 DIAGNOSIS — M53.3 SACROILIAC JOINT DYSFUNCTION: Primary | ICD-10-CM

## 2018-06-18 PROCEDURE — 97545 WORK HARDENING: CPT | Performed by: PHYSICAL THERAPIST

## 2018-06-20 ENCOUNTER — TREATMENT (OUTPATIENT)
Dept: PHYSICAL THERAPY | Facility: CLINIC | Age: 58
End: 2018-06-20

## 2018-06-20 DIAGNOSIS — M53.3 SACROILIAC JOINT DYSFUNCTION: Primary | ICD-10-CM

## 2018-06-20 PROCEDURE — 97545 WORK HARDENING: CPT | Performed by: PHYSICAL THERAPIST

## 2018-06-20 NOTE — PROGRESS NOTES
Physical Therapy Daily Progress Note    Subjective   Huma Montano reports that she is feeling good with therapy and she feels like her strength and endurance are getting better. She does have some occasional discomfort with more intense exercise/activity but this is improving as well.     Objective   See Exercise, Manual, and Modality Logs for complete treatment.       Assessment/Plan     Pt is making steady progress with therapy and she is able to perform exercises with less rest breaks throughout her session. She is gradually beginning to increase her reps.     Progress per Plan of Care and Progress strengthening /stabilization /functional activity           Work conditioning 128 min    Timed Treatment:   128   mins   Total Treatment:     135   mins    Manoj Martini, PT  Physical Therapist

## 2018-06-25 NOTE — PROGRESS NOTES
Physical Therapy Daily Progress Note    Subjective   Huma Montano reports that she has been having a little more pain recently, she has tried to do some SI corrections but it does not seem to be helping much.     Objective   See Exercise, Manual, and Modality Logs for complete treatment.       Assessment/Plan     Started treatment with SI correction. Pt seemed to respond well initially and she was able to complete most exercises without exacerbation of symptoms. Will assess her response at the next visit.     Progress per Plan of Care and Progress strengthening /stabilization /functional activity           Work conditioning - 120 min     Timed Treatment:   120   mins   Total Treatment:     125   mins    Manoj Martini, PT  Physical Therapist

## 2018-06-27 ENCOUNTER — TREATMENT (OUTPATIENT)
Dept: PHYSICAL THERAPY | Facility: CLINIC | Age: 58
End: 2018-06-27

## 2018-06-27 DIAGNOSIS — M53.3 SACROILIAC JOINT DYSFUNCTION: Primary | ICD-10-CM

## 2018-06-27 PROCEDURE — 97545 WORK HARDENING: CPT | Performed by: PHYSICAL THERAPIST

## 2018-06-29 ENCOUNTER — TREATMENT (OUTPATIENT)
Dept: PHYSICAL THERAPY | Facility: CLINIC | Age: 58
End: 2018-06-29

## 2018-06-29 DIAGNOSIS — M53.3 SACROILIAC JOINT DYSFUNCTION: Primary | ICD-10-CM

## 2018-06-29 PROCEDURE — 97545 WORK HARDENING: CPT | Performed by: PHYSICAL THERAPIST

## 2018-06-30 NOTE — PROGRESS NOTES
Physical Therapy Daily Progress Note    Subjective   Huma Montano reports that the SI self-correction is getting better. She still has some catching in her right hip/SI but it is improving     Objective   See Exercise, Manual, and Modality Logs for complete treatment.       Assessment/Plan     Progressed to lifting activity in the clinic today. Pt was able to perform 10 lifts with a 20# box without exacerbation of symptoms.     Progress per Plan of Care and Progress strengthening /stabilization /functional activity           Work conditioning 125    Timed Treatment:   125   mins   Total Treatment:     135   mins    Manoj Martini, PT  Physical Therapist

## 2018-06-30 NOTE — PROGRESS NOTES
Physical Therapy Daily Progress Note    Subjective   Huma Montano reports that she is feeling a little more discomfort in the SI joint today. She tried self-corrections in both directions, but did not have much relief.     Objective   See Exercise, Manual, and Modality Logs for complete treatment.     Pt was educated on proper technique for SI self-correction prior to beginning work conditioning.     Assessment/Plan     Pt is progressing well with therapy and she demonstrates an improvement in her tolerance to activity. Once she was able to self-correct her SI she had less pain. Will progress to lifting activity at the next visit.     Progress per Plan of Care and Progress strengthening /stabilization /functional activity           Work Conditioning 126    Timed Treatment:   126   mins   Total Treatment:     135   mins    Manoj Martini, PT  Physical Therapist

## 2018-07-02 ENCOUNTER — TRANSCRIBE ORDERS (OUTPATIENT)
Dept: ADMINISTRATIVE | Facility: HOSPITAL | Age: 58
End: 2018-07-02

## 2018-07-02 ENCOUNTER — TREATMENT (OUTPATIENT)
Dept: PHYSICAL THERAPY | Facility: CLINIC | Age: 58
End: 2018-07-02

## 2018-07-02 DIAGNOSIS — Z12.31 VISIT FOR SCREENING MAMMOGRAM: Primary | ICD-10-CM

## 2018-07-02 DIAGNOSIS — M53.3 SACROILIAC JOINT DYSFUNCTION: Primary | ICD-10-CM

## 2018-07-02 PROCEDURE — 97545 WORK HARDENING: CPT | Performed by: PHYSICAL THERAPIST

## 2018-07-05 ENCOUNTER — TREATMENT (OUTPATIENT)
Dept: PHYSICAL THERAPY | Facility: CLINIC | Age: 58
End: 2018-07-05

## 2018-07-05 DIAGNOSIS — M53.3 SACROILIAC JOINT DYSFUNCTION: Primary | ICD-10-CM

## 2018-07-05 DIAGNOSIS — R29.3 POSTURE IMBALANCE: ICD-10-CM

## 2018-07-05 PROCEDURE — 97545 WORK HARDENING: CPT | Performed by: PHYSICAL THERAPIST

## 2018-07-05 PROCEDURE — 97140 MANUAL THERAPY 1/> REGIONS: CPT | Performed by: PHYSICAL THERAPIST

## 2018-07-05 NOTE — PROGRESS NOTES
Re-Assessment / Re-Certification    Patient: Huma Montano   : 1960  Diagnosis/ICD-10 Code:  Sacroiliac joint dysfunction [M53.3]  Referring practitioner: Gonzalez Ochoa MD  Date of Initial Visit: 2018  Today's Date: 2018  Patient seen for 21 sessions      Subjective:   Huma Montano reports that she is having any easier time with lifting and with doing the exercises but she is still unable to do all exercises without fatigue or some exacerbation of symptoms. She has been having some SI Joint pain recently but seems to be able to mostly control her pain with self-corrections. At this time she feels like she need to work more on her endurance and strength with lifting.   Clinical Progress: improved  Home Program Compliance: Yes  Treatment has included: work conditioning    Subjective   Objective       Functional Assessment     Comments  Pt able to perform 20# box lift x10 in the clinic for a total or 30 reps, but she requires rest breaks in between sets due to fatigue and some discomfort in the SI.     Pt able to tolerate low to moderate level push-pull activities in the clinic with minimal exacerbation of symptoms.     She fatigues with hip and core stabilization activities.      Assessment/Plan  Progress toward previous goals: Partially Met     Because of the potentially high demands of the patients job in regards to lifting, pushing, pulling, and prolonged driving; I feel it would be prudent to continue to progress functional strengthening and reinforce core and hip strength and stabilization principles to ensure the best chance to return to work with minimal risk of reinjury.     New Goals  No new goals at this time    Recommendations: Continue with recommendations : Would recommend that patient be seem 2-3 times per week for 2-4 more weeks to continue to progress strengtheing and to facilitate a return to full work activities.   Timeframe: 1 month  Prognosis to achieve goals: good    PT  Signature: Manoj Martini PT      Based upon review of the patient's progress and continued therapy plan, it is my medical opinion that Huma Montano should continue physical therapy treatment at  PHYSICAL THERAPY  [address] [place] [id number] [phone].    Signature: __________________________________  Gonzalez Ochoa MD    Work conditioning 135 min     Timed Treatment:   135   mins   Total Treatment:     140   mins

## 2018-07-06 NOTE — PROGRESS NOTES
Physical Therapy Daily Progress Note    Subjective   Huma Montano reports that she went to the MD today and she is coming back for 4 more weeks of work conditioning and the MD also wants manual therapy done as well.     Objective   See Exercise, Manual, and Modality Logs for complete treatment.     Function/work related exercises performed today:  - 20# F-W lift - 10 reps - 2.5 sets performed with pain noted during lifts   - Total gym squats - 3 min - 4 sets performed without pain  - Push/pull on weight stack - 3-10# - 10 reps - 4 sets performed (pt reported pain with all but 3# push/pull)  - stairs - up/down 3x - 5 sets performed  - static lunge - 5x ea LE - 5 sets performed     Assessment/Plan     Pt required more rest breaks in the clinic today. She tolerated less lifting and was unable to perform moderate push/pull without increasing pain. Will continue to try to progress activity as tolerated     Progress per Plan of Care and Progress strengthening /stabilization /functional activity           Manual Therapy:    14     mins  64729;  Work Conditionin min     Timed Treatment:   144   mins   Total Treatment:     150   mins    Manoj Martini, PT  Physical Therapist

## 2018-07-12 ENCOUNTER — TRANSCRIBE ORDERS (OUTPATIENT)
Dept: ADMINISTRATIVE | Facility: HOSPITAL | Age: 58
End: 2018-07-12

## 2018-07-12 ENCOUNTER — HOSPITAL ENCOUNTER (OUTPATIENT)
Dept: CT IMAGING | Facility: HOSPITAL | Age: 58
Discharge: HOME | End: 2018-07-12
Attending: INTERNAL MEDICINE | Admitting: INTERNAL MEDICINE

## 2018-07-12 DIAGNOSIS — K43.6 INCARCERATED VENTRAL HERNIA: ICD-10-CM

## 2018-07-12 DIAGNOSIS — K43.6 INCARCERATED VENTRAL HERNIA: Primary | ICD-10-CM

## 2018-07-12 LAB — CREAT BLDA-MCNC: 0.9 MG/DL (ref 0.6–1.3)

## 2018-07-12 PROCEDURE — 25010000002 IOPAMIDOL 61 % SOLUTION: Performed by: INTERNAL MEDICINE

## 2018-07-12 PROCEDURE — 82565 ASSAY OF CREATININE: CPT

## 2018-07-12 PROCEDURE — 74178 CT ABD&PLV WO CNTR FLWD CNTR: CPT

## 2018-07-12 RX ADMIN — BARIUM SULFATE 450 ML: 21 SUSPENSION ORAL at 12:20

## 2018-07-12 RX ADMIN — IOPAMIDOL 80 ML: 612 INJECTION, SOLUTION INTRAVENOUS at 13:50

## 2018-07-27 ENCOUNTER — APPOINTMENT (OUTPATIENT)
Dept: GENERAL RADIOLOGY | Facility: HOSPITAL | Age: 58
End: 2018-07-27

## 2018-07-27 ENCOUNTER — HOSPITAL ENCOUNTER (EMERGENCY)
Facility: HOSPITAL | Age: 58
Discharge: HOME | End: 2018-07-27
Attending: EMERGENCY MEDICINE | Admitting: EMERGENCY MEDICINE

## 2018-07-27 ENCOUNTER — OFFICE VISIT (OUTPATIENT)
Dept: NEUROSURGERY | Facility: CLINIC | Age: 58
End: 2018-07-27

## 2018-07-27 VITALS
OXYGEN SATURATION: 95 % | BODY MASS INDEX: 25.74 KG/M2 | DIASTOLIC BLOOD PRESSURE: 85 MMHG | WEIGHT: 164 LBS | RESPIRATION RATE: 18 BRPM | TEMPERATURE: 98.4 F | HEIGHT: 67 IN | HEART RATE: 90 BPM | SYSTOLIC BLOOD PRESSURE: 146 MMHG

## 2018-07-27 VITALS
WEIGHT: 164.2 LBS | BODY MASS INDEX: 26.39 KG/M2 | SYSTOLIC BLOOD PRESSURE: 122 MMHG | DIASTOLIC BLOOD PRESSURE: 84 MMHG | HEIGHT: 66 IN | TEMPERATURE: 98.9 F

## 2018-07-27 DIAGNOSIS — S93.601A RIGHT FOOT SPRAIN, INITIAL ENCOUNTER: ICD-10-CM

## 2018-07-27 DIAGNOSIS — G89.29 CHRONIC MIDLINE LOW BACK PAIN WITHOUT SCIATICA: ICD-10-CM

## 2018-07-27 DIAGNOSIS — S29.019A STRAIN OF THORACIC REGION, INITIAL ENCOUNTER: ICD-10-CM

## 2018-07-27 DIAGNOSIS — M54.2 CERVICALGIA: ICD-10-CM

## 2018-07-27 DIAGNOSIS — M54.50 CHRONIC MIDLINE LOW BACK PAIN WITHOUT SCIATICA: ICD-10-CM

## 2018-07-27 DIAGNOSIS — M47.814 THORACIC SPONDYLOSIS WITHOUT MYELOPATHY: Primary | ICD-10-CM

## 2018-07-27 DIAGNOSIS — G89.29 CHRONIC BILATERAL LOW BACK PAIN WITH RIGHT-SIDED SCIATICA: ICD-10-CM

## 2018-07-27 DIAGNOSIS — Z98.1 STATUS POST LUMBAR SPINAL FUSION: ICD-10-CM
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
## 2018-07-27 DIAGNOSIS — M47.812 CERVICAL SPONDYLOSIS WITHOUT MYELOPATHY: ICD-10-CM

## 2018-07-27 DIAGNOSIS — S39.012A ACUTE MYOFASCIAL STRAIN OF LUMBAR REGION, INITIAL ENCOUNTER: ICD-10-CM

## 2018-07-27 DIAGNOSIS — M41.25 OTHER IDIOPATHIC SCOLIOSIS, THORACOLUMBAR REGION: ICD-10-CM

## 2018-07-27 DIAGNOSIS — M54.41 CHRONIC BILATERAL LOW BACK PAIN WITH RIGHT-SIDED SCIATICA: ICD-10-CM

## 2018-07-27 PROCEDURE — 99283 EMERGENCY DEPT VISIT LOW MDM: CPT

## 2018-07-27 PROCEDURE — 73630 X-RAY EXAM OF FOOT: CPT

## 2018-07-27 PROCEDURE — 72100 X-RAY EXAM L-S SPINE 2/3 VWS: CPT

## 2018-07-27 PROCEDURE — 99213 OFFICE O/P EST LOW 20 MIN: CPT | Performed by: NEUROLOGICAL SURGERY

## 2018-07-27 PROCEDURE — 72072 X-RAY EXAM THORAC SPINE 3VWS: CPT

## 2018-07-27 NOTE — PROGRESS NOTES
Patient: Huma Montano  :  1960  Chart #:  [id number]    Date of Service: 18    Chief Complaint:   Chief Complaint   Patient presents with   • Back Pain       Back Pain   Chronicity: Mrs. Montano returns today for a follow-up on her back pain. Episode onset:   On 17 she had a left L2-L3 transforaminal lumbar interbody fusion with capstone cage and right L3-L4 transforaminal lumbar interbody fusion.  Episode onset: Patient states that her workmans comp is running out soon.  Dr. Ochoa wanted to do more surgery on patient, however she declined. Episode frequency: She is currently working on core strengthening. The problem has been gradually improving (Her injury was on 17.) since onset. The pain is present in the lumbar spine (She has some muscular pain in her lower lumbar region.). The quality of the pain is described as aching. The pain does not radiate. The pain is at a severity of 3/10 (Patient states that she takes one Tramadol in the morning to get going.). The pain is mild. The pain is worse during the day. The symptoms are aggravated by position. Stiffness is present in the morning (She currently is not lifting anything over 10 pounds.). Risk factors: Patient states that she has a hernia.  She feels as if a black cloud is following her around.  She is already scheduled for surgery for the hernia. She has tried analgesics, NSAIDs, muscle relaxant, bed rest, home exercises and walking (She has been going to physical therapy.  At this point she may need her PCP to take over her care.) for the symptoms. The treatment provided moderate relief.       Radiographic Images:   X-ray of the lumbar spine dated 17 shows her sagittal alignment is normal.  There are no fractures noted.  She has a slight Left scoliotic curve.     Past Medical History:   Diagnosis Date   • Allergic    • Arthritis    • Back pain    • Graves disease    • Injury of back    • Kyphosis of cervical region    •  "Scoliosis    • Spinal stenosis    • Urinary tract infection    • Wears glasses      Current Outpatient Prescriptions   Medication Sig Dispense Refill   • amphetamine-dextroamphetamine XR (ADDERALL XR) 25 MG 24 hr capsule Take 25 mg by mouth every morning.     • aspirin 81 MG EC tablet Take 1 tablet by mouth daily. 30 tablet 0   • gabapentin (NEURONTIN) 600 MG tablet Take 1 tablet by mouth 3 (Three) Times a Day. 90 tablet 5   • levothyroxine (SYNTHROID, LEVOTHROID) 112 MCG tablet Take 112 mcg by mouth daily.     • lisinopril (PRINIVIL,ZESTRIL) 10 MG tablet Take 10 mg by mouth Daily.     • meloxicam (MOBIC) 15 MG tablet TAKE ONE TABLET BY MOUTH DAILY WITH FOOD 30 tablet 2   • Multiple Vitamins-Minerals (MULTIVITAL PO) Take 1 tablet by mouth Daily.     • ondansetron (ZOFRAN) 4 MG tablet Take 4 mg by mouth Every 4 (Four) Hours As Needed for Nausea or Vomiting (CAN TAKE EVERY 4-6 HRS PRN).     • tiZANidine (ZANAFLEX) 4 MG tablet Take 8 mg by mouth At Night As Needed for Muscle Spasms (CAN TAKE 2 TABS AT BEDTIME, EACH TAB 4MG).     • traMADol (ULTRAM) 50 MG tablet Take 1 tablet by mouth Every 6 (Six) Hours As Needed for Moderate Pain . 60 tablet 2   • traMADol ER (ULTRAM ER) 300 MG 24 hr tablet Take 1 tablet by mouth Daily. 60 tablet 0     No current facility-administered medications for this visit.       Allergies   Allergen Reactions   • Erythromycin Nausea And Vomiting and GI Intolerance     \"INTENSE STOMACH PAIN\"   • Lortab [Hydrocodone-Acetaminophen] Other (See Comments)     SKIN BREAKDOWN     Social History     Social History   • Marital status:      Social History Main Topics   • Smoking status: Never Smoker   • Smokeless tobacco: Never Used   • Alcohol use No   • Drug use: No   • Sexual activity: Defer     Other Topics Concern   • Not on file     Family History   Problem Relation Age of Onset   • No Known Problems Mother    • No Known Problems Father    • Breast cancer Neg Hx    • Ovarian cancer Neg Hx  " "    Past Surgical History:   Procedure Laterality Date   • BREAST BIOPSY Right 2016   •  SECTION     • CYST REMOVAL      Breast   • CYST REMOVAL      Cervical cyst   • HERNIA REPAIR     • LUMBAR LAMINECTOMY WITH FUSION N/A 2017    Procedure: L5-S1 OSTEOTOMY; L2-L3,L3-L4 AND L5-S1 INTERBODY FUSIONS; L2 TO S1 POSTERIOR FUSION WITH PEDICLE SCREWS AND BONE GRAFT;  Surgeon: Connor Lopez MD;  Location: [facility] OR;  Service:    • OOPHORECTOMY     • ROTATOR CUFF REPAIR      3x   • SEPTOPLASTY     • SPINAL FUSION     • STEROID INJECTION      LUMBAR     Review of Systems   Musculoskeletal: Positive for back pain.   All other systems reviewed and are negative.    Vitals:    18 0759   BP: 122/84   BP Location: Right arm   Patient Position: Sitting   Temp: 98.9 °F (37.2 °C)   TempSrc: Temporal Artery    Weight: 74.5 kg (164 lb 3.2 oz)   Height: 168.9 cm (66.5\")     Physical Exam  Neurologic Exam  Constitutional: She is oriented to person, place, and time. Vital signs are normal. She appears well-developed and well-nourished. No distress.   Neat healthy female   Neck: Trachea normal and normal range of motion. No thyroid mass present.   Minimal neck stiffness; No Spurling's or L'Hermitte's signs; Shoulder ROM normal   Musculoskeletal:   Thoracic back: She exhibits pain. She exhibits no tenderness and no deformity.   Lumbar back: She exhibits pain. She exhibits normal range of motion, no deformity and no spasm.   Healed lumbar incision; Mild stiffness; SLR negative; Hip ROM normal    Tender to palpation of R buttock and ischial tuberosity  Tender trigger point over R SI joint.      Mental Status   Oriented to person, place, and time.   Attention: normal. Concentration: normal.   Speech: speech is normal   Level of consciousness: alert  Knowledge: good and consistent with education.   Normal comprehension.       Cranial Nerves   Cranial nerves II through XII intact.       Motor Exam   Muscle bulk: " normal  Overall muscle tone: normal      Strength   Strength 5/5 throughout.       Sensory Exam   Light touch normal.   Proprioception normal.       Gait, Coordination, and Reflexes       Gait: normal      Tremor   Resting tremor: absent  Intention tremor: absent  Action tremor: absent      Reflexes   Right biceps: 1+  Left biceps: 1+  Right triceps: 1+  Left triceps: 1+  Right patellar: 2+  Left patellar: 2+  Right achilles: 1+  Left achilles: 1+  Right Scherer: absent  Left Scherer: absent  Right ankle clonus: absent  Left ankle clonus: absent  LATASHA normal; Right handed       Huma was seen today for back pain.    Diagnoses and all orders for this visit:    Thoracic spondylosis without myelopathy    Chronic midline low back pain without sciatica    Status post lumbar spinal fusion    Other idiopathic scoliosis, thoracolumbar region    Chronic bilateral low back pain with right-sided sciatica    Cervical spondylosis without myelopathy    Cervicalgia      Plan:   She is to get the ventral hernia repaired then resume work hardening;  She is to f/u with Dr. Ochoa;  She will likely be at [facility] following completion of work hardening.  She is to f/u with pain management for medication refills.    I, Dr. Connor Lopez, personally performed the services described in the documentation as scribed in my presence, and the documentation is both accurate and complete.    Connor Lopez MD

## 2018-08-13 ENCOUNTER — HOSPITAL ENCOUNTER (OUTPATIENT)
Dept: MAMMOGRAPHY | Facility: HOSPITAL | Age: 58
Discharge: HOME | End: 2018-08-13
Attending: FAMILY MEDICINE | Admitting: FAMILY MEDICINE

## 2018-08-13 DIAGNOSIS — Z12.31 VISIT FOR SCREENING MAMMOGRAM: ICD-10-CM

## 2018-08-13 PROCEDURE — 77067 SCR MAMMO BI INCL CAD: CPT

## 2018-08-13 PROCEDURE — 77063 BREAST TOMOSYNTHESIS BI: CPT | Performed by: RADIOLOGY

## 2018-08-13 PROCEDURE — 77063 BREAST TOMOSYNTHESIS BI: CPT

## 2018-08-13 PROCEDURE — 77067 SCR MAMMO BI INCL CAD: CPT | Performed by: RADIOLOGY

## 2018-08-15 ENCOUNTER — APPOINTMENT (OUTPATIENT)
Dept: PREADMISSION TESTING | Facility: HOSPITAL | Age: 58
End: 2018-08-15

## 2018-08-15 LAB
ANION GAP SERPL CALCULATED.3IONS-SCNC: 5 MMOL/L (ref 3–11)
BUN BLD-MCNC: 19 MG/DL (ref 9–23)
BUN/CREAT SERPL: 21.1 (ref 7–25)
CALCIUM SPEC-SCNC: 10 MG/DL (ref 8.7–10.4)
CHLORIDE SERPL-SCNC: 103 MMOL/L (ref 99–109)
CO2 SERPL-SCNC: 29 MMOL/L (ref 20–31)
CREAT BLD-MCNC: 0.9 MG/DL (ref 0.6–1.3)
DEPRECATED RDW RBC AUTO: 47.7 FL (ref 37–54)
ERYTHROCYTE [DISTWIDTH] IN BLOOD BY AUTOMATED COUNT: 13.3 % (ref 11.3–14.5)
GFR SERPL CREATININE-BSD FRML MDRD: 64 ML/MIN/1.73
GLUCOSE BLD-MCNC: 91 MG/DL (ref 70–100)
HCT VFR BLD AUTO: 47.4 % (ref 34.5–44)
HGB BLD-MCNC: 15.8 G/DL (ref 11.5–15.5)
MCH RBC QN AUTO: 32.8 PG (ref 27–31)
MCHC RBC AUTO-ENTMCNC: 33.3 G/DL (ref 32–36)
MCV RBC AUTO: 98.5 FL (ref 80–99)
PLATELET # BLD AUTO: 358 10*3/MM3 (ref 150–450)
PMV BLD AUTO: 10.5 FL (ref 6–12)
POTASSIUM BLD-SCNC: 3.9 MMOL/L (ref 3.5–5.5)
RBC # BLD AUTO: 4.81 10*6/MM3 (ref 3.89–5.14)
SODIUM BLD-SCNC: 137 MMOL/L (ref 132–146)
WBC NRBC COR # BLD: 9.18 10*3/MM3 (ref 3.5–10.8)

## 2018-08-15 PROCEDURE — 85027 COMPLETE CBC AUTOMATED: CPT | Performed by: SURGERY

## 2018-08-15 PROCEDURE — 36415 COLL VENOUS BLD VENIPUNCTURE: CPT

## 2018-08-15 PROCEDURE — 80048 BASIC METABOLIC PNL TOTAL CA: CPT | Performed by: SURGERY

## 2018-09-15 DIAGNOSIS — G89.29 CHRONIC BILATERAL LOW BACK PAIN WITH RIGHT-SIDED SCIATICA: ICD-10-CM

## 2018-09-15 DIAGNOSIS — M54.41 CHRONIC BILATERAL LOW BACK PAIN WITH RIGHT-SIDED SCIATICA: ICD-10-CM

## 2018-09-17 RX ORDER — GABAPENTIN 600 MG/1
TABLET ORAL
Qty: 90 TABLET | Refills: 4 | Status: SHIPPED | OUTPATIENT
Start: 2018-09-17 | End: 2022-10-18

## 2018-09-17 NOTE — TELEPHONE ENCOUNTER
Per Dr. Lopez's note from 7/27/18, she should get further refills from pain management.  We will approve this refill, but please make her aware that she needs to make arrangements for ongoing refills with another provider.

## 2018-09-17 NOTE — TELEPHONE ENCOUNTER
Provider: Sakina  Caller: pharmacy  Phone #: [phone]   Surgery:L5-S1 OSTEOTOMY; L2-L3,L3-L4 AND L5-S1 INTERBODY FUSIONS; L2 TO S1 POSTERIOR FUSION WITH PEDICLE SCREWS AND BONE GRAFT Surgery Date: 4/2017   Last visit: 7/27/18    Next visit: PRN    NIKOLE:   07/25/2018 Gabapentin 600MG 1960 90 30 PENELOPE Lopez Brett [facility] [address] 2  08/06/2018 Tramadol Hcl 50MG 1960 60 15 PENELOPE Lopez Brett [facility] [address] 20 2  08/15/2018 Testosterone Micronized S 0 1960 0 30 CAYLA Reyna Magdalene [facility] [place]  [facility]  [facility] 2   08/18/2018 Tramadol Hcl Er 300MG 1960 30 30 GEN Broderick Melanie [facility] [address] 30 2  08/20/2018 Gabapentin 600MG 1960 90 30 PENELOPE Lopez Brett [facility] [address] 2  08/20/2018 Oxycodone/Acetaminophen 325MG/5MG 1960 42 4 PHILIPPE Maldonado Matthew [facility] [address] 79 2  08/26/2018 Amphetamine/Dextroampheta Er  6.25MG/6.25MG/6.25MG/6.25  1960 30 30 KAPIL Cameron Joseph  [facility] [address] 2  09/10/2018 Tramadol Hcl 50MG 1960 60 30 GEN Broderick Melanie [facility] [address] 10 2         Reason for call:         Requested Prescriptions     Pending Prescriptions Disp Refills   • gabapentin (NEURONTIN) 600 MG tablet [Pharmacy Med Name: GABAPENTIN 600 MG TABLET] 90 tablet 4     Sig: TAKE ONE TABLET BY MOUTH THREE TIMES A DAY

## 2018-09-20 ENCOUNTER — TRANSCRIBE ORDERS (OUTPATIENT)
Dept: ADMINISTRATIVE | Facility: HOSPITAL | Age: 58
End: 2018-09-20
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
## 2018-09-22 ENCOUNTER — HOSPITAL ENCOUNTER (OUTPATIENT)
Dept: MRI IMAGING | Facility: HOSPITAL | Age: 58
Discharge: HOME | End: 2018-09-22
Admitting: NURSE PRACTITIONER
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
## 2018-09-22 PROCEDURE — 72146 MRI CHEST SPINE W/O DYE: CPT

## 2020-03-02 ENCOUNTER — TELEPHONE (OUTPATIENT)
Dept: NEUROSURGERY | Facility: CLINIC | Age: 60
End: 2020-03-02

## 2020-03-02 NOTE — TELEPHONE ENCOUNTER
"Provider:  Previously treated with Dr. Lopez  Caller:  patient  Time of call:  8:35am  Phone #:  [phone]  Surgery:  TLIF L3-4  Surgery Date:  Sept 2017  Last visit:   7/27/18  Next visit:     NIKOLE:         Reason for call:  Patient called to get scheduled for an appointment. She is having increased low back/sacral area pain.    She was previously seen by Dr. Lopez. Her last visit was under Worker's Comp due to a fall at work. The Work Comp company sent her to Dr. Ochoa for eval of her back. She came here to see Dr. Lopez as a \"second opinion\", even though she had seen him previously.    I asked if we would be seeing her under Work Comp for a new visit here. She stated the claim has been settled. I asked if she had any new scans of her back. She said no.    I asked Roxann Dick how to proceed with an appointment. She recommended that since we have not seen the patient in over a year, we would need a new referral.    I advised patient of this. She stated she was seeing her PCP today. I told her they could refer her to our office. They could also order new testing. We will also need a letter from [employer] stating the claim has been settled and they will no longer be covering treatment of her back. She voiced understanding.          "

## 2020-03-17 ENCOUNTER — OFFICE VISIT (OUTPATIENT)
Dept: NEUROSURGERY | Facility: CLINIC | Age: 60
End: 2020-03-17

## 2020-03-17 VITALS — TEMPERATURE: 98.3 F | HEIGHT: 67 IN | WEIGHT: 156.2 LBS | RESPIRATION RATE: 15 BRPM | BODY MASS INDEX: 24.52 KG/M2

## 2020-03-17 DIAGNOSIS — M54.41 CHRONIC BILATERAL LOW BACK PAIN WITH RIGHT-SIDED SCIATICA: Primary | ICD-10-CM

## 2020-03-17 DIAGNOSIS — M54.50 CHRONIC MIDLINE LOW BACK PAIN WITHOUT SCIATICA: ICD-10-CM

## 2020-03-17 DIAGNOSIS — Z98.1 STATUS POST LUMBAR SPINAL FUSION: ICD-10-CM

## 2020-03-17 DIAGNOSIS — G89.29 CHRONIC MIDLINE LOW BACK PAIN WITHOUT SCIATICA: ICD-10-CM

## 2020-03-17 DIAGNOSIS — G89.29 CHRONIC BILATERAL LOW BACK PAIN WITH RIGHT-SIDED SCIATICA: Primary | ICD-10-CM

## 2020-03-17 DIAGNOSIS — M47.814 THORACIC SPONDYLOSIS WITHOUT MYELOPATHY: ICD-10-CM

## 2020-03-17 PROCEDURE — 99213 OFFICE O/P EST LOW 20 MIN: CPT | Performed by: PHYSICIAN ASSISTANT
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
## 2020-11-23 PROCEDURE — U0004 COV-19 TEST NON-CDC HGH THRU: HCPCS | Performed by: NURSE PRACTITIONER

## 2021-03-29 ENCOUNTER — TRANSCRIBE ORDERS (OUTPATIENT)
Dept: ADMINISTRATIVE | Facility: HOSPITAL | Age: 61
End: 2021-03-29

## 2021-03-29 DIAGNOSIS — M54.50 ACUTE LEFT-SIDED LOW BACK PAIN WITHOUT SCIATICA: Primary | ICD-10-CM

## 2021-05-19 ENCOUNTER — TRANSCRIBE ORDERS (OUTPATIENT)
Dept: ADMINISTRATIVE | Facility: HOSPITAL | Age: 61
End: 2021-05-19

## 2021-05-19 DIAGNOSIS — Z12.31 VISIT FOR SCREENING MAMMOGRAM: Primary | ICD-10-CM

## 2021-06-22 ENCOUNTER — HOSPITAL ENCOUNTER (OUTPATIENT)
Dept: MAMMOGRAPHY | Facility: HOSPITAL | Age: 61
Discharge: HOME | End: 2021-06-22
Admitting: STUDENT IN AN ORGANIZED HEALTH CARE EDUCATION/TRAINING PROGRAM

## 2021-06-22 DIAGNOSIS — Z12.31 VISIT FOR SCREENING MAMMOGRAM: ICD-10-CM

## 2021-06-22 PROCEDURE — 77067 SCR MAMMO BI INCL CAD: CPT

## 2021-06-22 PROCEDURE — 77067 SCR MAMMO BI INCL CAD: CPT | Performed by: RADIOLOGY

## 2021-06-22 PROCEDURE — 77063 BREAST TOMOSYNTHESIS BI: CPT | Performed by: RADIOLOGY

## 2021-06-22 PROCEDURE — 77063 BREAST TOMOSYNTHESIS BI: CPT

## 2021-07-16 ENCOUNTER — TELEPHONE (OUTPATIENT)
Dept: NEUROSURGERY | Facility: CLINIC | Age: 61
End: 2021-07-16

## 2021-07-16 NOTE — TELEPHONE ENCOUNTER
Caller: Huma Montano    Relationship to patient: Self    Best call back number: [phone]    Chief complaint: FOLLOW UP APPT     Type of visit: *FOLLOW UP     Requested date:  NEXT AVAIL     Additional notes: NEW RECORDS IN CHART FROM YUDY GARCÍA MD,  LAST APPT WITH [facility] 3/17/2020. WAS SUPPOSED TO FOLLOW UP IN 6/8 WEEKS. PLEASE ADVISE ON SCHEDULING AND CALL PATIENT .

## 2021-07-16 NOTE — TELEPHONE ENCOUNTER
New MRI ordered by referring MD was denied. With no new images, patient will need to follow up with a PA-C for re-eval.

## 2021-07-22 ENCOUNTER — HOSPITAL ENCOUNTER (OUTPATIENT)
Dept: ULTRASOUND IMAGING | Facility: HOSPITAL | Age: 61
Discharge: HOME | End: 2021-07-22

## 2021-07-22 ENCOUNTER — HOSPITAL ENCOUNTER (OUTPATIENT)
Dept: MAMMOGRAPHY | Facility: HOSPITAL | Age: 61
Discharge: HOME | End: 2021-07-22

## 2021-07-22 ENCOUNTER — TRANSCRIBE ORDERS (OUTPATIENT)
Dept: MAMMOGRAPHY | Facility: HOSPITAL | Age: 61
End: 2021-07-22

## 2021-07-22 DIAGNOSIS — R92.8 ABNORMAL MAMMOGRAM: ICD-10-CM

## 2021-07-22 DIAGNOSIS — R92.8 ABNORMAL MAMMOGRAM: Primary | ICD-10-CM

## 2021-07-22 PROCEDURE — G0279 TOMOSYNTHESIS, MAMMO: HCPCS

## 2021-07-22 PROCEDURE — 77065 DX MAMMO INCL CAD UNI: CPT

## 2021-07-22 PROCEDURE — 77061 BREAST TOMOSYNTHESIS UNI: CPT | Performed by: RADIOLOGY

## 2021-07-22 PROCEDURE — 76642 ULTRASOUND BREAST LIMITED: CPT

## 2021-07-22 PROCEDURE — 76642 ULTRASOUND BREAST LIMITED: CPT | Performed by: RADIOLOGY

## 2021-07-22 PROCEDURE — 77065 DX MAMMO INCL CAD UNI: CPT | Performed by: RADIOLOGY

## 2021-08-02 ENCOUNTER — TRANSCRIBE ORDERS (OUTPATIENT)
Dept: ADMINISTRATIVE | Facility: HOSPITAL | Age: 61
End: 2021-08-02

## 2021-08-02 DIAGNOSIS — M48.00 SPINAL STENOSIS, MULTIPLE SITES IN SPINE: Primary | ICD-10-CM

## 2021-08-05 NOTE — TELEPHONE ENCOUNTER
WORK COMP AUTH FROM [facility] FOR MRI LSPINE AND  NEUROSURGICAL CONSULTATION FOR A BACK INJURY 3/4/21 RECVD FAX ALREADY IN MEDIA. INDEX PROGRESS NOTE TO CHART FROM RODOLFO GARCÍA MD 5/26/21. PT WOULD LIKE TO SEE DR DYE PER AUTH FAX. I SPOKE TO [facility] WHO HAVE [facility]  AUTH TO [facility] FOR SCHEDULING THERE IS A NEW MRI REFERRAL FROM 8/2/21 IN CHART NOT YET SCHEDULED

## 2021-08-09 ENCOUNTER — HOSPITAL ENCOUNTER (OUTPATIENT)
Dept: MRI IMAGING | Facility: HOSPITAL | Age: 61
Discharge: HOME | End: 2021-08-09
Admitting: STUDENT IN AN ORGANIZED HEALTH CARE EDUCATION/TRAINING PROGRAM

## 2021-08-09 DIAGNOSIS — M48.00 SPINAL STENOSIS, MULTIPLE SITES IN SPINE: ICD-10-CM

## 2021-08-09 PROCEDURE — 72148 MRI LUMBAR SPINE W/O DYE: CPT

## 2021-09-15 ENCOUNTER — OFFICE VISIT (OUTPATIENT)
Dept: NEUROSURGERY | Facility: CLINIC | Age: 61
End: 2021-09-15

## 2021-09-15 VITALS — BODY MASS INDEX: 25.93 KG/M2 | HEIGHT: 67 IN | TEMPERATURE: 97.1 F | WEIGHT: 165.2 LBS

## 2021-09-15 DIAGNOSIS — M47.819 FACET ARTHROPATHY: ICD-10-CM

## 2021-09-15 DIAGNOSIS — M54.9 MECHANICAL BACK PAIN: Primary | ICD-10-CM

## 2021-09-15 DIAGNOSIS — M51.36 DDD (DEGENERATIVE DISC DISEASE), LUMBAR: ICD-10-CM

## 2021-09-15 DIAGNOSIS — S39.012A ACUTE MYOFASCIAL STRAIN OF LUMBAR REGION, INITIAL ENCOUNTER: ICD-10-CM

## 2021-09-15 PROCEDURE — 99213 OFFICE O/P EST LOW 20 MIN: CPT | Performed by: NEUROLOGICAL SURGERY

## 2021-09-15 RX ORDER — DEXTROAMPHETAMINE SACCHARATE, AMPHETAMINE ASPARTATE, DEXTROAMPHETAMINE SULFATE AND AMPHETAMINE SULFATE 5; 5; 5; 5 MG/1; MG/1; MG/1; MG/1
TABLET ORAL
COMMUNITY
Start: 2021-09-07 | End: 2022-08-10 | Stop reason: ALTCHOICE

## 2021-09-15 RX ORDER — OMEPRAZOLE 20 MG/1
20 CAPSULE, DELAYED RELEASE ORAL DAILY
COMMUNITY
Start: 2021-09-05 | End: 2022-08-10 | Stop reason: SDUPTHER

## 2021-09-15 RX ORDER — TIZANIDINE 4 MG/1
4 TABLET ORAL
COMMUNITY
Start: 2021-08-30

## 2021-09-15 RX ORDER — FLUOXETINE HYDROCHLORIDE 40 MG/1
40 CAPSULE ORAL DAILY
COMMUNITY
Start: 2021-09-07 | End: 2022-10-18 | Stop reason: HOSPADM

## 2021-09-15 RX ORDER — ATORVASTATIN CALCIUM 80 MG/1
80 TABLET, FILM COATED ORAL NIGHTLY
COMMUNITY
Start: 2021-09-10 | End: 2022-08-10 | Stop reason: SDUPTHER

## 2021-09-15 RX ORDER — HYDROXYZINE HYDROCHLORIDE 10 MG/1
TABLET, FILM COATED ORAL
COMMUNITY
Start: 2021-09-08

## 2021-09-15 RX ORDER — LEVOTHYROXINE SODIUM 112 UG/1
112 TABLET ORAL DAILY
COMMUNITY
Start: 2021-08-12 | End: 2022-08-10 | Stop reason: SDUPTHER

## 2021-09-15 NOTE — PROGRESS NOTES
Patient: Huma Montano  : 1960    Primary Care Provider: Nadeen Rodriguez MD    Requesting Provider: As above        History    Chief Complaint: Left lower back pain.    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  In  she underwent L4-5 fusion.  She suffered a fall and was injured and then in 2017 underwent L2-S1 fusion by my former colleague Dr. Lopez.  At that time she was afflicted with right lower back pain that has persisted.  She has continued to work and has done relatively well.  On  of this year she was working with the patient when she injured her low back.  She is also had some thoracic symptoms.  Her injury entailed transferring the patient.  Has been doing some physical therapy which has been somewhat helpful.  She continues on a light duty status.  She is worse with trunk rotation, standing, walking, bending forward.  She does a bit better with changing positions frequently and lying on her side.  She is followed by Dr. XI Morales.    Review of Systems   Constitutional: Negative for activity change, appetite change, chills, diaphoresis, fatigue, fever and unexpected weight change.   HENT: Positive for sinus pressure, sore throat and tinnitus. Negative for congestion, dental problem, drooling, ear discharge, ear pain, facial swelling, hearing loss, mouth sores, nosebleeds, postnasal drip, rhinorrhea, sinus pain, sneezing, trouble swallowing and voice change.    Eyes: Positive for redness. Negative for photophobia, pain, discharge, itching and visual disturbance.   Respiratory: Positive for shortness of breath. Negative for apnea, cough, choking, chest tightness, wheezing and stridor.    Cardiovascular: Negative for chest pain, palpitations and leg swelling.   Gastrointestinal: Negative for abdominal distention, abdominal pain, anal bleeding, blood in stool, constipation, diarrhea, nausea, rectal pain and  "vomiting.   Endocrine: Negative for cold intolerance, heat intolerance, polydipsia, polyphagia and polyuria.   Genitourinary: Negative for decreased urine volume, difficulty urinating, dysuria, enuresis, flank pain, frequency, genital sores, hematuria and urgency.   Musculoskeletal: Positive for arthralgias, back pain, gait problem, myalgias and neck stiffness. Negative for joint swelling and neck pain.   Skin: Negative for color change, pallor, rash and wound.   Allergic/Immunologic: Positive for environmental allergies and immunocompromised state. Negative for food allergies.   Neurological: Positive for weakness, numbness and headaches. Negative for dizziness, tremors, seizures, syncope, facial asymmetry, speech difficulty and light-headedness.   Hematological: Negative for adenopathy. Does not bruise/bleed easily.   Psychiatric/Behavioral: Positive for decreased concentration and sleep disturbance. Negative for agitation, behavioral problems, confusion, dysphoric mood, hallucinations, self-injury and suicidal ideas. The patient is nervous/anxious. The patient is not hyperactive.        The patient's past medical history, past surgical history, family history, and social history have been reviewed at length in the electronic medical record.    Physical Exam:   Temp 97.1 °F (36.2 °C)   Ht 170.2 cm (67\")   Wt 74.9 kg (165 lb 3.2 oz)   BMI 25.87 kg/m²   CONSTITUTIONAL: Patient is well-nourished, pleasant and appears stated age.  CV: Heart regular rate and rhythm without murmur, rub, or gallop.  PULMONARY: Lungs are clear to ascultation.  MUSCULOSKELETAL:  Straight leg raising is negative.  Devin's Sign is negative.  ROM in the low back is normal.  Tenderness in the back to palpation is not observed.  Lengthy well-healed midline lumbosacral incision is noted.  NEUROLOGICAL:  Orientation, memory, attention span, language function, and cognition have been examined and are intact.  Strength is intact in the lower " extremities to direct testing.  Muscle tone is normal throughout.  Station and gait are normal.  Sensation is intact to light touch testing throughout.  Deep tendon reflexes are 1+ and symmetrical.  Coordination is intact.      Medical Decision Making    Data Review:   (All imaging studies were personally reviewed unless stated otherwise)  MRI of the lumbar spine dated 8/9/2021 demonstrates hardware artifact from L2-S1.  There is mild stenosis at L1-2 above her construct.    Diagnosis:   1.  Lumbar strain.  2.  Mechanical low back pain.    Treatment Options:   Currently, I do not see a role for surgical intervention.  She is followed in the pain clinic and sees Dr. Morales.  If her symptoms persist or worsen then she may be a candidate for trial of a spinal cord stimulator.  She will follow-up in our clinic on an as-needed basis.       Diagnosis Plan   1. Mechanical back pain     2. Acute myofascial strain of lumbar region, initial encounter     3. DDD (degenerative disc disease), lumbar     4. Facet arthropathy         Scribed for Dominik Alcala MD by Radha Tom [facility] 9/15/2021 13:05 EDT      I, Dr. Alcala, personally performed the services described in the documentation, as scribed in my presence, and it is both accurate and complete.

## 2021-11-24 ENCOUNTER — OFFICE VISIT (OUTPATIENT)
Dept: ENDOCRINOLOGY | Facility: CLINIC | Age: 61
End: 2021-11-24

## 2021-11-24 ENCOUNTER — LAB (OUTPATIENT)
Dept: LAB | Facility: HOSPITAL | Age: 61
End: 2021-11-24

## 2021-11-24 VITALS
SYSTOLIC BLOOD PRESSURE: 130 MMHG | OXYGEN SATURATION: 97 % | DIASTOLIC BLOOD PRESSURE: 80 MMHG | BODY MASS INDEX: 27.48 KG/M2 | WEIGHT: 171 LBS | HEIGHT: 66 IN | HEART RATE: 102 BPM

## 2021-11-24 DIAGNOSIS — E89.0 POSTABLATIVE HYPOTHYROIDISM: Primary | ICD-10-CM

## 2021-11-24 DIAGNOSIS — R73.03 PREDIABETES: ICD-10-CM

## 2021-11-24 LAB
HBA1C MFR BLD: 6.35 % (ref 4.8–5.6)
TSH SERPL DL<=0.05 MIU/L-ACNC: 4.39 UIU/ML (ref 0.27–4.2)

## 2021-11-24 PROCEDURE — 84443 ASSAY THYROID STIM HORMONE: CPT | Performed by: INTERNAL MEDICINE

## 2021-11-24 PROCEDURE — 83036 HEMOGLOBIN GLYCOSYLATED A1C: CPT | Performed by: INTERNAL MEDICINE

## 2021-11-24 PROCEDURE — 99204 OFFICE O/P NEW MOD 45 MIN: CPT | Performed by: INTERNAL MEDICINE

## 2021-11-24 RX ORDER — LANOLIN ALCOHOL/MO/W.PET/CERES
1000 CREAM (GRAM) TOPICAL DAILY
COMMUNITY

## 2021-11-24 RX ORDER — ACETAMINOPHEN 500 MG
500 TABLET ORAL EVERY 6 HOURS PRN
COMMUNITY

## 2021-11-24 NOTE — PROGRESS NOTES
"     Office Note      Date: 2021  Patient Name: Huma Montano  MRN: [id number]  : 1960    Chief Complaint   Patient presents with   • Thyroid Problem       History of Present Illness:   Huma Montano is a 61 y.o. female who presents for Thyroid Problem  she  Is seen as a new patient today  -  She had hyperthyroidism due to Graves' disease and was treated  With SEGURA. This rendered her hypothyroid.  She has been on replacment since - . Currently her  Her dose is 125 and her tsh was normal on this dose in august.   -  She has gained 30 pounds in the last 4 years . She has had to take intermittent courses of steroids for  Orthopedic issues.  She has developed pre- diabetes with a1c of 5.9  -    Subjective          Review of Systems:   Review of Systems   Constitutional: Positive for fatigue.   Musculoskeletal: Positive for back pain, gait problem and myalgias.   All other systems reviewed and are negative.      The following portions of the patient's history were reviewed and updated as appropriate: allergies, current medications, past family history, past medical history, past social history, past surgical history and problem list.    Objective     Visit Vitals  /80   Pulse 102   Ht 167.6 cm (66\")   Wt 77.6 kg (171 lb)   SpO2 97%   BMI 27.60 kg/m²       Labs:    CBC w/DIFF  Lab Results   Component Value Date    WBC 9.18 08/15/2018    RBC 4.81 08/15/2018    HGB 15.8 (H) 08/15/2018    HCT 47.4 (H) 08/15/2018    MCV 98.5 08/15/2018    MCH 32.8 (H) 08/15/2018    MCHC 33.3 08/15/2018    RDW 13.3 08/15/2018    RDWSD 47.7 08/15/2018    MPV 10.5 08/15/2018     08/15/2018    NEUTRORELPCT 79.2 (H) 2017    LYMPHORELPCT 10.2 (L) 2017    MONORELPCT 9.9 2017    EOSRELPCT 0.2 2017    BASORELPCT 0.1 2017    AUTOIGPER 0.4 2017    NEUTROABS 14.35 (H) 2017    LYMPHSABS 1.84 2017    MONOSABS 1.80 (H) 2017    EOSABS 0.04 (L) 2017    " BASOSABS 0.02 04/05/2017    AUTOIGNUM 0.07 (H) 04/05/2017       T4  No results found for: FREET4    TSH  No results found for: TSHBASE     Physical Exam:  Physical Exam  Vitals reviewed.   Constitutional:       Appearance: Normal appearance.   HENT:      Head: Normocephalic and atraumatic.   Eyes:      Extraocular Movements: Extraocular movements intact.   Neck:      Comments: No palpable thyroid tissue  Cardiovascular:      Rate and Rhythm: Normal rate and regular rhythm.   Pulmonary:      Effort: Pulmonary effort is normal. No respiratory distress.   Musculoskeletal:      Comments: + tremor   Lymphadenopathy:      Cervical: No cervical adenopathy.   Neurological:      Mental Status: She is alert.   Psychiatric:         Mood and Affect: Mood normal.         Thought Content: Thought content normal.         Judgment: Judgment normal.         Assessment / Plan      Assessment & Plan:  Problem List Items Addressed This Visit        Other    Prediabetes    Overview     Related to intermittent steroid therapy for orthopedic issues          Current Assessment & Plan     a1c 5.9 in august  Repeat today pending  Diet and exercise guidelines for prevention of diabetes reviewed.  No meds needed          Relevant Orders    Hemoglobin A1c    Postablative hypothyroidism - Primary    Overview     S/p I 131 therapy for graves in 1988         Current Assessment & Plan     Clinically euthyroid. Thyroid levels ordered. Medication to be adjusted accordingly.         Relevant Medications    levothyroxine (SYNTHROID, LEVOTHROID) 125 MCG tablet    Other Relevant Orders    TSH           Deacon Mendez MD   11/24/2021

## 2021-11-24 NOTE — ASSESSMENT & PLAN NOTE
a1c 5.9 in august  Repeat today pending  Diet and exercise guidelines for prevention of diabetes reviewed.  No meds needed

## 2022-01-26 ENCOUNTER — TRANSCRIBE ORDERS (OUTPATIENT)
Dept: MAMMOGRAPHY | Facility: HOSPITAL | Age: 62
End: 2022-01-26

## 2022-01-26 ENCOUNTER — HOSPITAL ENCOUNTER (OUTPATIENT)
Dept: ULTRASOUND IMAGING | Facility: HOSPITAL | Age: 62
Discharge: HOME | End: 2022-01-26

## 2022-01-26 ENCOUNTER — HOSPITAL ENCOUNTER (OUTPATIENT)
Dept: MAMMOGRAPHY | Facility: HOSPITAL | Age: 62
Discharge: HOME | End: 2022-01-26

## 2022-01-26 DIAGNOSIS — R92.8 ABNORMAL MAMMOGRAM: Primary | ICD-10-CM

## 2022-01-26 DIAGNOSIS — R92.8 ABNORMAL MAMMOGRAM: ICD-10-CM

## 2022-01-26 PROCEDURE — 76642 ULTRASOUND BREAST LIMITED: CPT

## 2022-01-26 PROCEDURE — 77061 BREAST TOMOSYNTHESIS UNI: CPT | Performed by: RADIOLOGY

## 2022-01-26 PROCEDURE — 76642 ULTRASOUND BREAST LIMITED: CPT | Performed by: RADIOLOGY

## 2022-01-26 PROCEDURE — 77065 DX MAMMO INCL CAD UNI: CPT

## 2022-01-26 PROCEDURE — G0279 TOMOSYNTHESIS, MAMMO: HCPCS

## 2022-01-26 PROCEDURE — 77065 DX MAMMO INCL CAD UNI: CPT | Performed by: RADIOLOGY

## 2022-08-10 ENCOUNTER — OFFICE VISIT (OUTPATIENT)
Dept: INTERNAL MEDICINE | Facility: CLINIC | Age: 62
End: 2022-08-10

## 2022-08-10 VITALS
TEMPERATURE: 98 F | BODY MASS INDEX: 25.99 KG/M2 | DIASTOLIC BLOOD PRESSURE: 84 MMHG | HEIGHT: 65 IN | SYSTOLIC BLOOD PRESSURE: 116 MMHG | HEART RATE: 92 BPM | WEIGHT: 156 LBS

## 2022-08-10 DIAGNOSIS — M51.36 DDD (DEGENERATIVE DISC DISEASE), LUMBAR: Chronic | ICD-10-CM

## 2022-08-10 DIAGNOSIS — E66.3 OVERWEIGHT (BMI 25.0-29.9): ICD-10-CM

## 2022-08-10 DIAGNOSIS — F90.0 ATTENTION DEFICIT HYPERACTIVITY DISORDER, PREDOMINANTLY INATTENTIVE TYPE: Chronic | ICD-10-CM

## 2022-08-10 DIAGNOSIS — Z11.59 NEED FOR HEPATITIS C SCREENING TEST: ICD-10-CM

## 2022-08-10 DIAGNOSIS — E89.0 POSTABLATIVE HYPOTHYROIDISM: Primary | Chronic | ICD-10-CM

## 2022-08-10 DIAGNOSIS — R73.03 PREDIABETES: ICD-10-CM

## 2022-08-10 DIAGNOSIS — I10 ESSENTIAL HYPERTENSION: Chronic | ICD-10-CM

## 2022-08-10 DIAGNOSIS — Z12.11 SCREEN FOR COLON CANCER: ICD-10-CM

## 2022-08-10 DIAGNOSIS — E78.2 MIXED HYPERLIPIDEMIA: Chronic | ICD-10-CM

## 2022-08-10 PROBLEM — M54.16 LUMBAR BACK PAIN WITH RADICULOPATHY AFFECTING RIGHT LOWER EXTREMITY: Status: ACTIVE | Noted: 2022-08-10

## 2022-08-10 PROBLEM — G47.33 OSA ON CPAP: Chronic | Status: ACTIVE | Noted: 2022-08-10

## 2022-08-10 PROBLEM — Z99.89 OSA ON CPAP: Chronic | Status: ACTIVE | Noted: 2022-08-10

## 2022-08-10 PROBLEM — M51.369 DDD (DEGENERATIVE DISC DISEASE), LUMBAR: Chronic | Status: ACTIVE | Noted: 2022-08-10

## 2022-08-10 PROBLEM — M48.05 SPINAL STENOSIS, THORACOLUMBAR REGION: Status: ACTIVE | Noted: 2020-03-18

## 2022-08-10 PROBLEM — M41.9 SCOLIOSIS DEFORMITY OF SPINE: Status: RESOLVED | Noted: 2017-04-04 | Resolved: 2022-08-10

## 2022-08-10 PROBLEM — M41.25 IDIOPATHIC SCOLIOSIS OF THORACOLUMBAR REGION: Status: ACTIVE | Noted: 2022-08-10

## 2022-08-10 PROBLEM — M41.25 IDIOPATHIC SCOLIOSIS OF THORACOLUMBAR REGION: Chronic | Status: ACTIVE | Noted: 2022-08-10

## 2022-08-10 PROCEDURE — 99204 OFFICE O/P NEW MOD 45 MIN: CPT | Performed by: STUDENT IN AN ORGANIZED HEALTH CARE EDUCATION/TRAINING PROGRAM

## 2022-08-10 RX ORDER — ATORVASTATIN CALCIUM 80 MG/1
80 TABLET, FILM COATED ORAL NIGHTLY
Qty: 90 TABLET | Refills: 1 | Status: SHIPPED | OUTPATIENT
Start: 2022-08-10 | End: 2023-03-24 | Stop reason: SDUPTHER

## 2022-08-10 RX ORDER — LANOLIN ALCOHOL/MO/W.PET/CERES
400 CREAM (GRAM) TOPICAL DAILY
COMMUNITY

## 2022-08-10 RX ORDER — ONDANSETRON 4 MG/1
4 TABLET, FILM COATED ORAL EVERY 4 HOURS PRN
Qty: 30 TABLET | Refills: 1 | Status: SHIPPED | OUTPATIENT
Start: 2022-08-10

## 2022-08-10 RX ORDER — DEXTROAMPHETAMINE SACCHARATE, AMPHETAMINE ASPARTATE MONOHYDRATE, DEXTROAMPHETAMINE SULFATE AND AMPHETAMINE SULFATE 6.25; 6.25; 6.25; 6.25 MG/1; MG/1; MG/1; MG/1
25 CAPSULE, EXTENDED RELEASE ORAL EVERY MORNING
COMMUNITY
End: 2022-10-18 | Stop reason: ALTCHOICE

## 2022-08-10 RX ORDER — MELOXICAM 15 MG/1
15 TABLET ORAL DAILY
Qty: 90 TABLET | Refills: 1 | Status: SHIPPED | OUTPATIENT
Start: 2022-08-10 | End: 2022-08-26 | Stop reason: SDUPTHER

## 2022-08-10 RX ORDER — LEVOTHYROXINE SODIUM 112 UG/1
112 TABLET ORAL DAILY
Qty: 90 TABLET | Refills: 1 | Status: SHIPPED | OUTPATIENT
Start: 2022-08-10 | End: 2022-08-26 | Stop reason: SDUPTHER

## 2022-08-10 RX ORDER — OMEPRAZOLE 20 MG/1
20 CAPSULE, DELAYED RELEASE ORAL DAILY
Qty: 90 CAPSULE | Refills: 1 | Status: SHIPPED | OUTPATIENT
Start: 2022-08-10 | End: 2022-08-26 | Stop reason: SDUPTHER

## 2022-08-10 RX ORDER — MELATONIN
1000 DAILY
COMMUNITY
End: 2022-10-18 | Stop reason: SDUPTHER

## 2022-08-10 RX ORDER — LISINOPRIL 10 MG/1
10 TABLET ORAL DAILY
Qty: 90 TABLET | Refills: 1 | Status: SHIPPED | OUTPATIENT
Start: 2022-08-10

## 2022-08-10 RX ORDER — FLUTICASONE PROPIONATE 50 MCG
2 SPRAY, SUSPENSION (ML) NASAL DAILY
COMMUNITY
End: 2022-10-18 | Stop reason: SDUPTHER

## 2022-08-10 NOTE — PROGRESS NOTES
Chief Complaint  Huma Montano is a 62 y.o. female presenting for Graves Disease (Establish Care ) and Med Refill.     From [place]. Lived in [place] for many years. , lives by herself - daughter lives with her PRN. She has 3 adult children. 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    Patient has a past medical history of hypertension, hyperlipidemia, Graves' disease s/p radioactive iodine ablation on Synthroid, prediabetes, MELISSA on CPAP, allergic rhinitis, ADHD on Adderall.  She has chronic musculoskeletal pain related to multiple injuries and scoliosis, spinal stenosis, s/p spinal surgery, followed by pain management.    History of Present Illness  Patient is here to [facility].  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  Back in 2000 she had a lower back injury followed by pain, had multiple injuries since and has had spinal surgery and continues with chronic pain.  She follows with pain management.  This is all led to her transitioning to disability from July 2022.    Patient also has a history of ADHD, [place] [facility] [place] care to psychiatry with [facility] if in network.  She is not decided if she wants to continue on Adderall long-term at this point.    Patient also has a history of Graves' disease, successfully ablated with radioactive iodine, continues on Synthroid 112 mcg daily, last adjusted down from 125 mg about 3 months ago.    The following portions of the patient's history were reviewed and updated as appropriate: allergies, current medications, past family history, past medical history, past social history, past surgical history and problem list.    Objective  /84 (BP Location: Left arm, Patient Position: Sitting, Cuff Size: Adult)   Pulse 92   Temp 98 °F (36.7 °C) (Temporal)   Ht 164 cm  "(64.57\")   Wt 70.8 kg (156 lb)   BMI 26.31 kg/m²     Physical Exam  Vitals reviewed.   Constitutional:       Appearance: Normal appearance.   HENT:      Head: Normocephalic and atraumatic.      Nose: Nose normal. No congestion.   Eyes:      Extraocular Movements: Extraocular movements intact.      Conjunctiva/sclera: Conjunctivae normal.   Cardiovascular:      Rate and Rhythm: Normal rate and regular rhythm.      Heart sounds: Normal heart sounds. No murmur heard.  Pulmonary:      Effort: Pulmonary effort is normal.      Breath sounds: Normal breath sounds.   Abdominal:      General: There is no distension.      Palpations: Abdomen is soft. There is no mass.      Tenderness: There is no abdominal tenderness.   Musculoskeletal:      Cervical back: Neck supple.      Right lower leg: No edema.      Left lower leg: No edema.   Skin:     General: Skin is warm and dry.   Neurological:      Mental Status: She is alert and oriented to person, place, and time. Mental status is at baseline.   Psychiatric:         Behavior: Behavior normal.         Thought Content: Thought content normal.         Assessment/Plan   1. Postablative hypothyroidism  We will recheck levels and continue on current dose of Synthroid.  - TSH; Future  - T4, Free; Future  - levothyroxine (SYNTHROID, LEVOTHROID) 112 MCG tablet; Take 1 tablet by mouth Daily.  Dispense: 90 tablet; Refill: 1    2. Essential hypertension  BP Readings from Last 3 Encounters:   08/10/22 116/84   11/24/21 130/80   11/23/20 139/91   Blood pressure currently at goal.  Continue on lisinopril 10 mg.  We will check labs  - lisinopril (PRINIVIL,ZESTRIL) 10 MG tablet; Take 1 tablet by mouth Daily.  Dispense: 90 tablet; Refill: 1    3. Mixed hyperlipidemia  Continue on atorvastatin 80 mg.  Patient will return for fasting lipids  - Lipid Panel; Future  - atorvastatin (LIPITOR) 80 MG tablet; Take 1 tablet by mouth Every Night. At bedtime  Dispense: 90 tablet; Refill: 1    4. Attention " deficit hyperactivity disorder, predominantly inattentive type  Will refer to behavioral health/psychiatry for transition of care.  Previous provider no longer on her plan.  - Ambulatory Referral to Behavioral Health    5. DDD (degenerative disc disease), lumbar  Overall stable.  Continue follow-up with pain management.  Patient does use omeprazole to protect her stomach lining while on meloxicam, she has no history.  She also uses ondansetron occasionally if having severe back pain, which sometimes makes her nauseous.  - meloxicam (MOBIC) 15 MG tablet; Take 1 tablet by mouth Daily. with food.  Dispense: 90 tablet; Refill: 1  - omeprazole (priLOSEC) 20 MG capsule; Take 1 capsule by mouth Daily.  Dispense: 90 capsule; Refill: 1  - ondansetron (ZOFRAN) 4 MG tablet; Take 1 tablet by mouth Every 4 (Four) Hours As Needed for Nausea or Vomiting (CAN TAKE EVERY 4-6 HRS PRN).  Dispense: 30 tablet; Refill: 1    6. Prediabetes  Hemoglobin A1C   Date Value Ref Range Status   11/24/2021 6.35 (H) 4.80 - 5.60 % Final   03/26/2017 5.90 (H) 4.80 - 5.60 % Final   Will recheck A1c.  - Comprehensive Metabolic Panel; Future  - Hemoglobin A1c; Future    7. Screen for colon cancer  Last colonoscopy about 10 years ago.  Patient would like to proceed with repeat colonoscopy at this time for screening  - Amb referral for Screening Colonoscopy    8. Need for hepatitis C screening test  Screen is recommended  - Hepatitis C Antibody; Future    9. Overweight (BMI 25.0-29.9)  BMI is >= 25 and <30. (Overweight) The following options were offered after discussion;: exercise counseling/recommendations and nutrition counseling/recommendations      Return in about 6 weeks (around 9/21/2022) for Annual physical.    Future Appointments       Provider Department Center    8/31/2022 8:30 AM [facility] FOLLOW-UP [facility] [address] [facility]    10/18/2022 8:30 AM Morgan Wong MD [facility]           Morgan Wong MD  Family Medicine  08/10/2022

## 2022-08-26 DIAGNOSIS — E89.0 POSTABLATIVE HYPOTHYROIDISM: Chronic | ICD-10-CM

## 2022-08-26 DIAGNOSIS — M51.36 DDD (DEGENERATIVE DISC DISEASE), LUMBAR: Chronic | ICD-10-CM

## 2022-08-26 RX ORDER — OMEPRAZOLE 20 MG/1
20 CAPSULE, DELAYED RELEASE ORAL DAILY
Qty: 90 CAPSULE | Refills: 1 | Status: SHIPPED | OUTPATIENT
Start: 2022-08-26

## 2022-08-26 RX ORDER — MELOXICAM 15 MG/1
15 TABLET ORAL DAILY
Qty: 30 TABLET | Refills: 1 | Status: SHIPPED | OUTPATIENT
Start: 2022-08-26

## 2022-08-26 RX ORDER — LEVOTHYROXINE SODIUM 112 UG/1
112 TABLET ORAL DAILY
Qty: 90 TABLET | Refills: 1 | Status: SHIPPED | OUTPATIENT
Start: 2022-08-26 | End: 2023-03-24 | Stop reason: SDUPTHER

## 2022-08-31 ENCOUNTER — APPOINTMENT (OUTPATIENT)
Dept: MAMMOGRAPHY | Facility: HOSPITAL | Age: 62
End: 2022-08-31

## 2022-09-06 ENCOUNTER — PATIENT MESSAGE (OUTPATIENT)
Dept: INTERNAL MEDICINE | Facility: CLINIC | Age: 62
End: 2022-09-06

## 2022-09-06 DIAGNOSIS — M79.7 FIBROMYALGIA: Primary | ICD-10-CM

## 2022-09-06 NOTE — TELEPHONE ENCOUNTER
From: Huma Montano  To: Morgan Wong MD  Sent: 2022 1:25 PM EDT  Subject: Medication refill     Hi,   I called your office on 22 to request a prescription refill on a medication that wasn't added at my initial new patient visit. The medication is Amitriptyline HCL 50 mg tablet. One tablet by mouth every night at bedtime.   [facility] [phone].    Thank you - Huma Montano   : 60

## 2022-09-07 ENCOUNTER — HOSPITAL ENCOUNTER (OUTPATIENT)
Dept: MAMMOGRAPHY | Facility: HOSPITAL | Age: 62
Discharge: HOME | End: 2022-09-07

## 2022-09-07 ENCOUNTER — HOSPITAL ENCOUNTER (OUTPATIENT)
Dept: ULTRASOUND IMAGING | Facility: HOSPITAL | Age: 62
Discharge: HOME | End: 2022-09-07

## 2022-09-07 DIAGNOSIS — R92.8 ABNORMAL MAMMOGRAM: ICD-10-CM

## 2022-09-07 PROCEDURE — 76642 ULTRASOUND BREAST LIMITED: CPT

## 2022-09-07 PROCEDURE — 77066 DX MAMMO INCL CAD BI: CPT

## 2022-09-07 PROCEDURE — G0279 TOMOSYNTHESIS, MAMMO: HCPCS

## 2022-09-07 PROCEDURE — 77066 DX MAMMO INCL CAD BI: CPT | Performed by: RADIOLOGY

## 2022-09-07 PROCEDURE — 77062 BREAST TOMOSYNTHESIS BI: CPT | Performed by: RADIOLOGY

## 2022-09-07 PROCEDURE — 76642 ULTRASOUND BREAST LIMITED: CPT | Performed by: RADIOLOGY

## 2022-09-09 PROBLEM — M79.7 FIBROMYALGIA: Status: ACTIVE | Noted: 2022-09-09

## 2022-09-09 RX ORDER — AMITRIPTYLINE HYDROCHLORIDE 50 MG/1
25 TABLET, FILM COATED ORAL NIGHTLY
Qty: 30 TABLET | Refills: 2 | Status: SHIPPED | OUTPATIENT
Start: 2022-09-09 | End: 2022-10-18 | Stop reason: HOSPADM

## 2022-09-13 ENCOUNTER — TRANSCRIBE ORDERS (OUTPATIENT)
Dept: MAMMOGRAPHY | Facility: HOSPITAL | Age: 62
End: 2022-09-13

## 2022-09-13 DIAGNOSIS — R92.8 ABNORMAL MAMMOGRAM: Primary | ICD-10-CM

## 2022-09-16 DIAGNOSIS — Z12.11 ENCOUNTER FOR SCREENING COLONOSCOPY: Primary | ICD-10-CM

## 2022-09-21 ENCOUNTER — TELEMEDICINE (OUTPATIENT)
Dept: PSYCHIATRY | Facility: CLINIC | Age: 62
End: 2022-09-21

## 2022-09-21 DIAGNOSIS — Z86.59 HISTORY OF ADHD: ICD-10-CM

## 2022-09-21 DIAGNOSIS — F33.1 MAJOR DEPRESSIVE DISORDER, RECURRENT EPISODE, MODERATE: Chronic | ICD-10-CM

## 2022-09-21 DIAGNOSIS — F41.1 GENERALIZED ANXIETY DISORDER: Primary | Chronic | ICD-10-CM

## 2022-09-21 PROCEDURE — 90792 PSYCH DIAG EVAL W/MED SRVCS: CPT | Performed by: NURSE PRACTITIONER
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
## 2022-10-05 ENCOUNTER — LAB (OUTPATIENT)
Dept: LAB | Facility: HOSPITAL | Age: 62
End: 2022-10-05

## 2022-10-05 DIAGNOSIS — Z11.59 NEED FOR HEPATITIS C SCREENING TEST: ICD-10-CM

## 2022-10-05 DIAGNOSIS — E89.0 POSTABLATIVE HYPOTHYROIDISM: Chronic | ICD-10-CM

## 2022-10-05 DIAGNOSIS — E78.2 MIXED HYPERLIPIDEMIA: Chronic | ICD-10-CM

## 2022-10-05 DIAGNOSIS — R73.03 PREDIABETES: ICD-10-CM

## 2022-10-05 PROCEDURE — 86803 HEPATITIS C AB TEST: CPT

## 2022-10-05 PROCEDURE — 83036 HEMOGLOBIN GLYCOSYLATED A1C: CPT

## 2022-10-05 PROCEDURE — 84439 ASSAY OF FREE THYROXINE: CPT

## 2022-10-05 PROCEDURE — 80061 LIPID PANEL: CPT

## 2022-10-05 PROCEDURE — 84443 ASSAY THYROID STIM HORMONE: CPT

## 2022-10-05 PROCEDURE — 80053 COMPREHEN METABOLIC PANEL: CPT

## 2022-10-06 ENCOUNTER — OUTSIDE FACILITY SERVICE (OUTPATIENT)
Dept: GASTROENTEROLOGY | Facility: CLINIC | Age: 62
End: 2022-10-06

## 2022-10-06 LAB
ALBUMIN SERPL-MCNC: 5 G/DL (ref 3.5–5.2)
ALBUMIN/GLOB SERPL: 1.9 G/DL
ALP SERPL-CCNC: 108 U/L (ref 39–117)
ALT SERPL W P-5'-P-CCNC: 31 U/L (ref 1–33)
ANION GAP SERPL CALCULATED.3IONS-SCNC: 11.6 MMOL/L (ref 5–15)
AST SERPL-CCNC: 30 U/L (ref 1–32)
BILIRUB SERPL-MCNC: 0.8 MG/DL (ref 0–1.2)
BUN SERPL-MCNC: 18 MG/DL (ref 8–23)
BUN/CREAT SERPL: 17.1 (ref 7–25)
CALCIUM SPEC-SCNC: 10.5 MG/DL (ref 8.6–10.5)
CHLORIDE SERPL-SCNC: 99 MMOL/L (ref 98–107)
CHOLEST SERPL-MCNC: 220 MG/DL (ref 0–200)
CO2 SERPL-SCNC: 29.4 MMOL/L (ref 22–29)
CREAT SERPL-MCNC: 1.05 MG/DL (ref 0.57–1)
EGFRCR SERPLBLD CKD-EPI 2021: 60.2 ML/MIN/1.73
GLOBULIN UR ELPH-MCNC: 2.6 GM/DL
GLUCOSE SERPL-MCNC: 91 MG/DL (ref 65–99)
HBA1C MFR BLD: 6 % (ref 4.8–5.6)
HCV AB SER DONR QL: NORMAL
HDLC SERPL-MCNC: 93 MG/DL (ref 40–60)
LDLC SERPL CALC-MCNC: 112 MG/DL (ref 0–100)
LDLC/HDLC SERPL: 1.18 {RATIO}
POTASSIUM SERPL-SCNC: 4.1 MMOL/L (ref 3.5–5.2)
PROT SERPL-MCNC: 7.6 G/DL (ref 6–8.5)
SODIUM SERPL-SCNC: 140 MMOL/L (ref 136–145)
T4 FREE SERPL-MCNC: 1.53 NG/DL (ref 0.93–1.7)
TRIGL SERPL-MCNC: 87 MG/DL (ref 0–150)
TSH SERPL DL<=0.05 MIU/L-ACNC: 2 UIU/ML (ref 0.27–4.2)
VLDLC SERPL-MCNC: 15 MG/DL (ref 5–40)

## 2022-10-06 PROCEDURE — 45380 COLONOSCOPY AND BIOPSY: CPT | Performed by: INTERNAL MEDICINE

## 2022-10-06 PROCEDURE — 88305 TISSUE EXAM BY PATHOLOGIST: CPT | Performed by: INTERNAL MEDICINE

## 2022-10-06 PROCEDURE — 45385 COLONOSCOPY W/LESION REMOVAL: CPT | Performed by: INTERNAL MEDICINE

## 2022-10-07 ENCOUNTER — LAB REQUISITION (OUTPATIENT)
Dept: LAB | Facility: HOSPITAL | Age: 62
End: 2022-10-07

## 2022-10-07 DIAGNOSIS — Z12.11 ENCOUNTER FOR SCREENING FOR MALIGNANT NEOPLASM OF COLON: ICD-10-CM

## 2022-10-07 DIAGNOSIS — K57.30 DIVERTICULOSIS OF LARGE INTESTINE WITHOUT PERFORATION OR ABSCESS WITHOUT BLEEDING: ICD-10-CM

## 2022-10-07 DIAGNOSIS — Z83.71 FAMILY HISTORY OF COLONIC POLYPS: ICD-10-CM

## 2022-10-07 DIAGNOSIS — D12.5 BENIGN NEOPLASM OF SIGMOID COLON: ICD-10-CM

## 2022-10-07 DIAGNOSIS — K64.8 OTHER HEMORRHOIDS: ICD-10-CM

## 2022-10-07 DIAGNOSIS — D12.3 BENIGN NEOPLASM OF TRANSVERSE COLON: ICD-10-CM

## 2022-10-10 LAB
CYTO UR: NORMAL
LAB AP CASE REPORT: NORMAL
LAB AP CLINICAL INFORMATION: NORMAL
PATH REPORT.FINAL DX SPEC: NORMAL
PATH REPORT.GROSS SPEC: NORMAL

## 2022-10-18 ENCOUNTER — OFFICE VISIT (OUTPATIENT)
Dept: INTERNAL MEDICINE | Facility: CLINIC | Age: 62
End: 2022-10-18

## 2022-10-18 ENCOUNTER — TELEPHONE (OUTPATIENT)
Dept: INTERNAL MEDICINE | Facility: CLINIC | Age: 62
End: 2022-10-18

## 2022-10-18 VITALS
BODY MASS INDEX: 26.39 KG/M2 | TEMPERATURE: 98.6 F | DIASTOLIC BLOOD PRESSURE: 82 MMHG | SYSTOLIC BLOOD PRESSURE: 124 MMHG | WEIGHT: 158.4 LBS | HEIGHT: 65 IN | HEART RATE: 88 BPM

## 2022-10-18 DIAGNOSIS — R73.03 PREDIABETES: ICD-10-CM

## 2022-10-18 DIAGNOSIS — Z23 NEED FOR INFLUENZA VACCINATION: ICD-10-CM

## 2022-10-18 DIAGNOSIS — J30.9 ALLERGIC RHINITIS, UNSPECIFIED SEASONALITY, UNSPECIFIED TRIGGER: Chronic | ICD-10-CM

## 2022-10-18 DIAGNOSIS — E66.3 OVERWEIGHT (BMI 25.0-29.9): ICD-10-CM

## 2022-10-18 DIAGNOSIS — Z23 NEED FOR COVID-19 VACCINE: ICD-10-CM

## 2022-10-18 DIAGNOSIS — Z00.00 WELL ADULT EXAM: Primary | ICD-10-CM

## 2022-10-18 DIAGNOSIS — G89.29 CHRONIC BILATERAL LOW BACK PAIN WITH RIGHT-SIDED SCIATICA: ICD-10-CM

## 2022-10-18 DIAGNOSIS — E55.9 VITAMIN D DEFICIENCY: ICD-10-CM

## 2022-10-18 DIAGNOSIS — M54.16 LUMBAR BACK PAIN WITH RADICULOPATHY AFFECTING RIGHT LOWER EXTREMITY: Chronic | ICD-10-CM

## 2022-10-18 DIAGNOSIS — M51.36 DDD (DEGENERATIVE DISC DISEASE), LUMBAR: Chronic | ICD-10-CM

## 2022-10-18 DIAGNOSIS — M54.41 CHRONIC BILATERAL LOW BACK PAIN WITH RIGHT-SIDED SCIATICA: ICD-10-CM

## 2022-10-18 DIAGNOSIS — I10 ESSENTIAL HYPERTENSION: Chronic | ICD-10-CM

## 2022-10-18 PROCEDURE — 0124A PR ADM SARSCOV2 30MCG/0.3ML BST: CPT | Performed by: STUDENT IN AN ORGANIZED HEALTH CARE EDUCATION/TRAINING PROGRAM

## 2022-10-18 PROCEDURE — 99396 PREV VISIT EST AGE 40-64: CPT | Performed by: STUDENT IN AN ORGANIZED HEALTH CARE EDUCATION/TRAINING PROGRAM

## 2022-10-18 PROCEDURE — 91312 COVID-19 (PFIZER) BIVALENT BOOSTER 12+YRS: CPT | Performed by: STUDENT IN AN ORGANIZED HEALTH CARE EDUCATION/TRAINING PROGRAM

## 2022-10-18 PROCEDURE — 90686 IIV4 VACC NO PRSV 0.5 ML IM: CPT | Performed by: STUDENT IN AN ORGANIZED HEALTH CARE EDUCATION/TRAINING PROGRAM

## 2022-10-18 PROCEDURE — 90471 IMMUNIZATION ADMIN: CPT | Performed by: STUDENT IN AN ORGANIZED HEALTH CARE EDUCATION/TRAINING PROGRAM

## 2022-10-18 RX ORDER — MELATONIN
1000 DAILY
Qty: 90 TABLET | Refills: 1 | Status: SHIPPED | OUTPATIENT
Start: 2022-10-18

## 2022-10-18 RX ORDER — CETIRIZINE HYDROCHLORIDE 10 MG/1
10 TABLET ORAL DAILY
Qty: 90 TABLET | Refills: 1 | Status: SHIPPED | OUTPATIENT
Start: 2022-10-18

## 2022-10-18 RX ORDER — CETIRIZINE HYDROCHLORIDE 10 MG/1
10 TABLET ORAL DAILY
COMMUNITY
End: 2022-10-18 | Stop reason: SDUPTHER

## 2022-10-18 RX ORDER — GABAPENTIN 600 MG/1
600 TABLET ORAL 2 TIMES DAILY
Qty: 1 TABLET | Refills: 0 | COMMUNITY
Start: 2022-10-18

## 2022-10-18 RX ORDER — FLUTICASONE PROPIONATE 50 MCG
2 SPRAY, SUSPENSION (ML) NASAL DAILY
Qty: 54.6 ML | Refills: 3 | Status: SHIPPED | OUTPATIENT
Start: 2022-10-18

## 2022-10-18 NOTE — TELEPHONE ENCOUNTER
Diclofenac Sodium (VOLTAREN) 1 % gel gel.  PHARMACY NEEDS TO KNOW WHERE SHE IS USING IT SO THEY CAN GET THE CORRECT GRAMS FOR INSURANCE PURPOSES.     PLEASE CALL OR RESEND

## 2022-10-18 NOTE — PROGRESS NOTES
Chief Complaint  Huma Montano is a 62 y.o. female presenting for Annual Exam (Already did labs ) and Pain (Neck  since 9/23/22   also right hand ).     From [place]. Lived in [place] for many years. , lives by herself - daughter lives with her PRN. She has 3 adult children. 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     Patient has a past medical history of hypertension, hyperlipidemia, Graves' disease s/p radioactive iodine ablation on Synthroid, prediabetes, MELISSA on CPAP, allergic rhinitis, ADHD on Adderall.  She has chronic musculoskeletal pain related to multiple injuries and scoliosis, spinal stenosis, s/p spinal surgery, followed by pain management.    History of Present Illness  Patient is here for annual physical.    She had functional capacity testing at the end of September.  She was lifting a lot of heavy items, multiple repetitions.  Since that time she has been having a lot of headaches and worsening neck pain on the right side, muscle spasms of the scapular area up to the right mastoid.  She has been taking Zanaflex nightly, but occasionally also during the day for relief.  She has also taken some ibuprofen that helps help.  Over the last days her symptoms have improved, but it affects her breathing at night.  Last night she barely slept at all.  She does continue to follow-up with pain management.    Established with a mental health provider.  They have tapered her off fluoxetine.  They did not recommend continuing on Adderall given that she was on tramadol.  Patient does feel somewhat more scattered since getting off Adderall.    Patient has been on vitamin D for a long time, she tries to get some sun exposure during the season to boost her levels.    She also has a history of allergies, she takes Flonase for relief.    She recently had her colonoscopy, they found a couple of benign polyps and recommended she repeat  "colonoscopy again in 5 years.    The following portions of the patient's history were reviewed and updated as appropriate: allergies, current medications, past family history, past medical history, past social history, past surgical history and problem list.    Objective  /82 (BP Location: Left arm, Patient Position: Sitting, Cuff Size: Adult)   Pulse 88   Temp 98.6 °F (37 °C) (Temporal)   Ht 165.1 cm (65\")   Wt 71.8 kg (158 lb 6.4 oz)   BMI 26.36 kg/m²     Physical Exam  Vitals reviewed.   Constitutional:       Appearance: Normal appearance.   HENT:      Head: Normocephalic and atraumatic.      Nose: No congestion.   Eyes:      Extraocular Movements: Extraocular movements intact.      Conjunctiva/sclera: Conjunctivae normal.   Cardiovascular:      Rate and Rhythm: Normal rate and regular rhythm.      Heart sounds: Normal heart sounds. No murmur heard.  Pulmonary:      Effort: Pulmonary effort is normal.      Breath sounds: Normal breath sounds.   Abdominal:      General: There is no distension.      Palpations: Abdomen is soft. There is no mass.      Tenderness: There is no abdominal tenderness.   Musculoskeletal:         General: No swelling.      Cervical back: Neck supple. No tenderness.      Right lower leg: No edema.      Left lower leg: No edema.   Lymphadenopathy:      Cervical: No cervical adenopathy.   Skin:     General: Skin is warm and dry.   Neurological:      Mental Status: She is alert and oriented to person, place, and time. Mental status is at baseline.   Psychiatric:         Behavior: Behavior normal.         Thought Content: Thought content normal.         Assessment/Plan   1. Well adult exam  Patient follows with OB/GYN and has appointment in December for Pap smear.    2. Lumbar back pain with radiculopathy affecting right lower extremity  3. DDD (degenerative disc disease), lumbar  Exacerbation of symptoms of her neck after functional capacity testing in September, but she is " gradually improving.  She continues to follow with pain management.  - Diclofenac Sodium (VOLTAREN) 1 % gel gel; Apply  topically to the appropriate area as directed 2 (Two) Times a Day.  Dispense: 350 g; Refill: 2    4. Essential hypertension  BP Readings from Last 3 Encounters:   10/18/22 124/82   08/10/22 116/84   11/24/21 130/80   Blood pressure currently at goal.  Continue on lisinopril 10 mg.    5. Allergic rhinitis, unspecified seasonality, unspecified trigger  Stable, continue on Flonase and cetirizine  - fluticasone (FLONASE) 50 MCG/ACT nasal spray; 2 sprays into the nostril(s) as directed by provider Daily. 1-2 sprays daily  Dispense: 54.6 mL; Refill: 3  - cetirizine (zyrTEC) 10 MG tablet; Take 1 tablet by mouth Daily.  Dispense: 90 tablet; Refill: 1    6. Prediabetes  Hemoglobin A1C   Date Value Ref Range Status   10/05/2022 6.00 (H) 4.80 - 5.60 % Final   11/24/2021 6.35 (H) 4.80 - 5.60 % Final   03/26/2017 5.90 (H) 4.80 - 5.60 % Final   Overall stable.  We will continue to monitor.    7. Vitamin D deficiency  Continue on vitamin D supplement.  Consider rechecking levels next blood draw  - cholecalciferol (VITAMIN D3) 25 MCG (1000 UT) tablet; Take 1 tablet by mouth Daily.  Dispense: 90 tablet; Refill: 1    8. Need for COVID-19 vaccine  Administered today  - COVID-19 Bivalent Booster (Pfizer) 12+yrs    9. Need for influenza vaccination  Administered  - FluLaval/Fluarix/Fluzone >6 Months    10. Overweight (BMI 25.0-29.9)  BMI is >= 25 and <30. (Overweight) The following options were offered after discussion;: exercise counseling/recommendations and nutrition counseling/recommendations      Return in about 6 months (around 4/18/2023) for Recheck.    Future Appointments       Provider Department Center    4/18/2023 9:00 AM Morgan Wong MD [facility] GROUP INTERNAL MEDICINE [facility]    9/8/2023 8:00 AM [facility] FOLLOW-UP [facility] [id number] [facility]            Morgan Wong,  MD  Family Medicine  10/18/2022

## 2022-11-30 ENCOUNTER — HOSPITAL ENCOUNTER (OUTPATIENT)
Dept: GENERAL RADIOLOGY | Facility: HOSPITAL | Age: 62
Discharge: HOME | End: 2022-11-30
Admitting: STUDENT IN AN ORGANIZED HEALTH CARE EDUCATION/TRAINING PROGRAM

## 2022-11-30 ENCOUNTER — OFFICE VISIT (OUTPATIENT)
Dept: INTERNAL MEDICINE | Facility: CLINIC | Age: 62
End: 2022-11-30

## 2022-11-30 VITALS
TEMPERATURE: 98.4 F | DIASTOLIC BLOOD PRESSURE: 88 MMHG | HEART RATE: 92 BPM | HEIGHT: 65 IN | SYSTOLIC BLOOD PRESSURE: 126 MMHG | BODY MASS INDEX: 26.06 KG/M2 | WEIGHT: 156.4 LBS

## 2022-11-30 DIAGNOSIS — E66.3 OVERWEIGHT (BMI 25.0-29.9): ICD-10-CM

## 2022-11-30 DIAGNOSIS — S69.92XD INJURY OF LEFT MIDDLE FINGER, SUBSEQUENT ENCOUNTER: ICD-10-CM

## 2022-11-30 DIAGNOSIS — M79.7 FIBROMYALGIA: Chronic | ICD-10-CM

## 2022-11-30 DIAGNOSIS — M54.16 LUMBAR BACK PAIN WITH RADICULOPATHY AFFECTING RIGHT LOWER EXTREMITY: Chronic | ICD-10-CM

## 2022-11-30 DIAGNOSIS — S69.92XD INJURY OF LEFT MIDDLE FINGER, SUBSEQUENT ENCOUNTER: Primary | ICD-10-CM

## 2022-11-30 PROCEDURE — 99214 OFFICE O/P EST MOD 30 MIN: CPT | Performed by: STUDENT IN AN ORGANIZED HEALTH CARE EDUCATION/TRAINING PROGRAM

## 2022-11-30 PROCEDURE — 73130 X-RAY EXAM OF HAND: CPT

## 2022-11-30 RX ORDER — AMITRIPTYLINE HYDROCHLORIDE 25 MG/1
25 TABLET, FILM COATED ORAL NIGHTLY
COMMUNITY
End: 2022-11-30 | Stop reason: SDUPTHER

## 2022-11-30 RX ORDER — AMITRIPTYLINE HYDROCHLORIDE 25 MG/1
25 TABLET, FILM COATED ORAL NIGHTLY
Qty: 90 TABLET | Refills: 1 | Status: SHIPPED | OUTPATIENT
Start: 2022-11-30

## 2022-11-30 NOTE — PROGRESS NOTES
Chief Complaint  Huma Montano is a 62 y.o. female presenting for Pain (Middle finger left hand , with movement , difficult to use  since Aug) and Back Pain (Has started back on amitriptyline).     From [place]. Lived in [place] for many years. , lives by herself - daughter lives with her PRN. She has 3 adult children. 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     Patient has a past medical history of hypertension, hyperlipidemia, Graves' disease s/p radioactive iodine ablation on Synthroid, prediabetes, MELISSA on CPAP, allergic rhinitis, ADHD on Adderall.  She has chronic musculoskeletal pain related to multiple injuries and scoliosis, spinal stenosis, s/p spinal surgery, followed by pain management.    History of Present Illness  Patient is here due to continued pain of her left middle finger.  On 9/23/2022 she had an injury when she was doing functional capacity testing.  She was lifting tabs overhead, they were too heavy for her and she got pain around the left third finger base.  This has continued to cause pain, it hurts to move and it hurts to pick things up.  She sometimes drops things.  She is not able to open a jar.  She also feels that hand swells on the dorsal side.    Patient also and weaned herself off amitriptyline for her fibromyalgia and back pain.  When she did she felt the pain started to get worse and she restarted the medication at 25 mg.  She used to be on 50 mg.  She is requesting refill.    She also has orthotics for her feet, prescribed by podiatry about 3 years ago.  These are wearing out and she needs new.  I am not exactly sure what form she would need, or if she would just need a prescription.  She will look into this and either reach out to her podiatrist or get back in touch with our office with specific information.    The following portions of the patient's history were reviewed and updated as  "appropriate: allergies, current medications, past family history, past medical history, past social history, past surgical history and problem list.    Objective  /88 (BP Location: Left arm, Patient Position: Sitting, Cuff Size: Adult)   Pulse 92   Temp 98.4 °F (36.9 °C) (Temporal)   Ht 165.1 cm (65\")   Wt 70.9 kg (156 lb 6.4 oz)   BMI 26.03 kg/m²     Physical Exam  Constitutional:       Appearance: Normal appearance.   Eyes:      Conjunctiva/sclera: Conjunctivae normal.   Pulmonary:      Effort: No respiratory distress.   Musculoskeletal:         General: Swelling and tenderness present.      Cervical back: Neck supple.      Comments: Left hand: Mild swelling over the dorsal middle finger base/MCP.  Able to make a fist, no apparent tendon injury on flexion and extension testing.  She is diffusely tender over the third MCP joint, and traction hurts.   Skin:     General: Skin is warm and dry.   Neurological:      Mental Status: She is alert and oriented to person, place, and time. Mental status is at baseline.      Gait: Gait normal.   Psychiatric:         Behavior: Behavior normal.         Thought Content: Thought content normal.         Assessment/Plan   1. Injury of left middle finger, subsequent encounter  Unclear injury.  We will do x-ray and refer to hand surgery for evaluation.  - XR Hand 3+ View Left; Future  - Ambulatory Referral to Hand Surgery    2. Fibromyalgia  3. Lumbar back pain with radiculopathy affecting right lower extremity  Will restart amitriptyline for her fibromyalgia and back pain.  - amitriptyline (ELAVIL) 25 MG tablet; Take 1 tablet by mouth Every Night.  Dispense: 90 tablet; Refill: 1    4. Overweight (BMI 25.0-29.9)  BMI is >= 25 and <30. (Overweight) The following options were offered after discussion;: Discussed medication treatment  Patient is inquiring about Ozempic.  I am not sure if that would be covered, she is prediabetic.  I am doubtful her insurance would cover " Ozempic for weight loss given that she is overweight and not obese, but she can check with her insurance.  If approved I can still be high co-pay.      Return if symptoms worsen or fail to improve, for Next scheduled follow up.    Future Appointments       Provider Department Center    4/18/2023 9:00 AM Morgan Wong MD [facility]    9/8/2023 8:00 AM [facility] BR FOLLOW-UP [facility] [place] [address]          Morgan Wong MD  Family Medicine  11/30/2022

## 2023-03-23 ENCOUNTER — LAB (OUTPATIENT)
Dept: LAB | Facility: HOSPITAL | Age: 63
End: 2023-03-23
Payer: COMMERCIAL

## 2023-03-23 ENCOUNTER — TRANSCRIBE ORDERS (OUTPATIENT)
Dept: LAB | Facility: HOSPITAL | Age: 63
End: 2023-03-23
Payer: COMMERCIAL

## 2023-03-23 DIAGNOSIS — M06.049 SERONEGATIVE RHEUMATOID ARTHRITIS OF HAND, UNSPECIFIED LATERALITY: Primary | ICD-10-CM

## 2023-03-23 DIAGNOSIS — M06.049 SERONEGATIVE RHEUMATOID ARTHRITIS OF HAND, UNSPECIFIED LATERALITY: ICD-10-CM

## 2023-03-23 LAB — ERYTHROCYTE [SEDIMENTATION RATE] IN BLOOD: 15 MM/HR (ref 0–30)

## 2023-03-23 PROCEDURE — 86038 ANTINUCLEAR ANTIBODIES: CPT

## 2023-03-23 PROCEDURE — 86200 CCP ANTIBODY: CPT

## 2023-03-23 PROCEDURE — 86140 C-REACTIVE PROTEIN: CPT

## 2023-03-23 PROCEDURE — 84550 ASSAY OF BLOOD/URIC ACID: CPT

## 2023-03-23 PROCEDURE — 36415 COLL VENOUS BLD VENIPUNCTURE: CPT

## 2023-03-23 PROCEDURE — 86431 RHEUMATOID FACTOR QUANT: CPT

## 2023-03-23 PROCEDURE — 85652 RBC SED RATE AUTOMATED: CPT

## 2023-03-24 DIAGNOSIS — E89.0 POSTABLATIVE HYPOTHYROIDISM: Chronic | ICD-10-CM

## 2023-03-24 DIAGNOSIS — E78.2 MIXED HYPERLIPIDEMIA: Chronic | ICD-10-CM

## 2023-03-24 LAB
CHROMATIN AB SERPL-ACNC: <10 IU/ML (ref 0–14)
CRP SERPL-MCNC: <0.3 MG/DL (ref 0–0.5)
URATE SERPL-MCNC: 5 MG/DL (ref 2.4–5.7)

## 2023-03-24 RX ORDER — ATORVASTATIN CALCIUM 80 MG/1
80 TABLET, FILM COATED ORAL NIGHTLY
Qty: 90 TABLET | Refills: 1 | Status: SHIPPED | OUTPATIENT
Start: 2023-03-24

## 2023-03-24 RX ORDER — LEVOTHYROXINE SODIUM 112 UG/1
112 TABLET ORAL DAILY
Qty: 90 TABLET | Refills: 1 | Status: SHIPPED | OUTPATIENT
Start: 2023-03-24

## 2023-03-25 LAB — CCP IGA+IGG SERPL IA-ACNC: 20 UNITS (ref 0–19)

## 2023-03-27 LAB — ANA SER QL IF: NEGATIVE

## 2023-04-14 ENCOUNTER — OFFICE VISIT (OUTPATIENT)
Dept: INTERNAL MEDICINE | Facility: CLINIC | Age: 63
End: 2023-04-14
Payer: COMMERCIAL

## 2023-04-14 VITALS
TEMPERATURE: 97.5 F | SYSTOLIC BLOOD PRESSURE: 108 MMHG | DIASTOLIC BLOOD PRESSURE: 76 MMHG | BODY MASS INDEX: 25.99 KG/M2 | WEIGHT: 156 LBS | HEIGHT: 65 IN | HEART RATE: 96 BPM

## 2023-04-14 DIAGNOSIS — L08.9 SKIN INFECTION: Primary | ICD-10-CM

## 2023-04-14 RX ORDER — CEPHALEXIN 250 MG/1
250 CAPSULE ORAL 4 TIMES DAILY
Qty: 20 CAPSULE | Refills: 0 | Status: SHIPPED | OUTPATIENT
Start: 2023-04-14 | End: 2023-04-18 | Stop reason: HOSPADM

## 2023-04-14 NOTE — PROGRESS NOTES
Acute Office Visit      Name: Huma Montano    : 1960     MRN: [id number]   Care Team: Patient Care Team:  Morgan Wong MD as PCP - General (Family Medicine)  Martinez Cameron MD as Referring Physician (Family Medicine)  Guillaume Morales II, MD (Pain Medicine)  Deacon Mendez MD as Consulting Physician (Endocrinology)    Chief Complaint  finger problem (Right middle finger red, painful, and swollen with some discoloration )    Subjective     History of Present Illness:  Huma Montano is a 62 y.o. female who presents today for evaluation of an infection to her finger.    Huma noted 2 days ago that her right hand middle finger was red and swollen.  The skin around the nail bed was hard to touch and painful when she pushed on it.    Denies injury.  Denies fever.  Denies any moist or draining areas.    Huma states that she called for the appointment on Wednesday and area is looking significantly better today however, she was uncomfortable canceling the appointment before the weekend.    Review of systems was completed, and pertinent findings are noted in the HPI.  Review of Systems   Skin: Positive for color change and wound.        Patient reports redness, swelling, and hardened skin to cuticle and surrounding area of right hand middle digit.       Past Medical History:   Diagnosis Date   • ADHD (attention deficit hyperactivity disorder)    • Allergic    • Arthritis    • Back pain    • Claustrophobia    • DDD (degenerative disc disease), lumbar 08/10/2022   • Depression    • Diabetes mellitus    • Essential hypertension 08/10/2022   • Fibromyalgia, primary    • Graves disease    • Headache    • Heartburn    • Hernia of abdominal wall    • Hyperlipidemia    • Hypertension    • Hyperthyroidism    • Idiopathic scoliosis of thoracolumbar region 08/10/2022   • Injury of back    • Kyphosis of cervical region    • Low back pain    • Mixed hyperlipidemia 08/10/2022   •  Obesity    • MELISSA on CPAP 08/10/2022   • Postablative hypothyroidism 2021    S/p I 131 therapy for graves in    • Prediabetes    • Scoliosis    • Sleep apnea    • Spinal stenosis    • Urinary tract infection    • Visual impairment     Wear glasses   • Wears glasses        Past Surgical History:   Procedure Laterality Date   • BREAST BIOPSY Right 12/10/2015    US bx   • BREAST EXCISIONAL BIOPSY Right 2016   •  SECTION     • CYST REMOVAL      Breast   • CYST REMOVAL      Cervical cyst   • EYE SURGERY      Lasix   • HERNIA REPAIR     • INGUINAL HERNIA REPAIR     • LUMBAR LAMINECTOMY WITH FUSION N/A 2017    Procedure: L5-S1 OSTEOTOMY; L2-L3,L3-L4 AND L5-S1 INTERBODY FUSIONS; L2 TO S1 POSTERIOR FUSION WITH PEDICLE SCREWS AND BONE GRAFT;  Surgeon: Connor Lopez MD;  Location: [facility];  Service:    • MENISCECTOMY     • ROTATOR CUFF REPAIR      3x   • SEPTOPLASTY     • SPINAL FUSION     • STEROID INJECTION      LUMBAR   • TONSILLECTOMY     • UMBILICAL HERNIA REPAIR         Social History     Socioeconomic History   • Marital status:    Tobacco Use   • Smoking status: Former     Packs/day: 0.25     Years: 1.00     Pack years: 0.25     Types: Cigarettes     Start date: 1979     Quit date: 1980     Years since quittin.3   • Smokeless tobacco: Never   • Tobacco comments:     social smoker in college   Vaping Use   • Vaping Use: Never used   Substance and Sexual Activity   • Alcohol use: Yes     Alcohol/week: 0.0 - 1.0 standard drinks     Comment: occasional glass of wine or cocktail w/dinner 2-4 x month   • Drug use: No   • Sexual activity: Not Currently     Partners: Male     Birth control/protection: Surgical, Post-menopausal, Tubal ligation     Comment: Tubal ligation          Current Outpatient Medications:   •  acetaminophen (TYLENOL) 500 MG tablet, Take 1 tablet by mouth Every 6 (Six) Hours As Needed for Mild Pain., Disp: , Rfl:   •  atorvastatin  (LIPITOR) 80 MG tablet, Take 1 tablet by mouth Every Night. At bedtime, Disp: 90 tablet, Rfl: 1  •  cetirizine (zyrTEC) 10 MG tablet, Take 1 tablet by mouth Daily., Disp: 90 tablet, Rfl: 1  •  cholecalciferol (VITAMIN D3) 25 MCG (1000 UT) tablet, Take 1 tablet by mouth Daily., Disp: 90 tablet, Rfl: 1  •  Diclofenac Sodium (VOLTAREN) 1 % gel gel, Apply  topically to the appropriate area as directed 2 (Two) Times a Day., Disp: 350 g, Rfl: 2  •  ESTRADIOL-PROGESTERONE PO, Take  by mouth Daily. Estrodiol 2 mg - progesterone 100 mg -testosterone 3 mg  daily, Disp: , Rfl:   •  fluticasone (FLONASE) 50 MCG/ACT nasal spray, 2 sprays into the nostril(s) as directed by provider Daily. 1-2 sprays daily, Disp: 54.6 mL, Rfl: 3  •  folic acid (FOLVITE) 400 MCG tablet, Take 1 tablet by mouth Daily., Disp: , Rfl:   •  gabapentin (NEURONTIN) 600 MG tablet, Take 1 tablet by mouth 2 (Two) Times a Day., Disp: 1 tablet, Rfl: 0  •  hydrOXYzine (ATARAX) 10 MG tablet, Take 10-30 mg PO every 4-6 hours PRN for sleep/anxiety, Disp: , Rfl:   •  levothyroxine (SYNTHROID, LEVOTHROID) 112 MCG tablet, Take 1 tablet by mouth Daily., Disp: 90 tablet, Rfl: 1  •  lisinopril (PRINIVIL,ZESTRIL) 10 MG tablet, Take 1 tablet by mouth Daily., Disp: 90 tablet, Rfl: 1  •  meloxicam (MOBIC) 15 MG tablet, Take 1 tablet by mouth Daily. with food., Disp: 30 tablet, Rfl: 1  •  Multiple Vitamins-Minerals (MULTIVITAL PO), Take 1 tablet by mouth Daily., Disp: , Rfl:   •  Nutritional Supplements (DHEA PO), Take  by mouth. 1  100 mg capsule 3 times a week, Disp: , Rfl:   •  omeprazole (priLOSEC) 20 MG capsule, Take 1 capsule by mouth Daily., Disp: 90 capsule, Rfl: 1  •  ondansetron (ZOFRAN) 4 MG tablet, Take 1 tablet by mouth Every 4 (Four) Hours As Needed for Nausea or Vomiting (CAN TAKE EVERY 4-6 HRS PRN)., Disp: 30 tablet, Rfl: 1  •  traMADol (ULTRAM) 50 MG tablet, Take 1 tablet by mouth Every 6 (Six) Hours As Needed for Moderate Pain ., Disp: 60 tablet, Rfl: 2  •   "traMADol ER (ULTRAM ER) 300 MG 24 hr tablet, Take 1 tablet by mouth Daily., Disp: 60 tablet, Rfl: 0  •  vitamin B-12 (CYANOCOBALAMIN) 1000 MCG tablet, Take 1 tablet by mouth Daily., Disp: , Rfl:   •  cephalexin (Keflex) 250 MG capsule, Take 1 capsule by mouth 4 (Four) Times a Day for 5 days., Disp: 20 capsule, Rfl: 0    Procedures    PHQ-9 Total Score:      Objective     Vital Signs  /76 (BP Location: Left arm, Patient Position: Sitting, Cuff Size: Adult)   Pulse 96   Temp 97.5 °F (36.4 °C) (Temporal)   Ht 165.1 cm (65\")   Wt 70.8 kg (156 lb)   BMI 25.96 kg/m²   Estimated body mass index is 25.96 kg/m² as calculated from the following:    Height as of this encounter: 165.1 cm (65\").    Weight as of this encounter: 70.8 kg (156 lb).          Physical Exam  Vitals and nursing note reviewed.   HENT:      Head: Normocephalic.   Musculoskeletal:        Hands:    Skin:     General: Skin is warm and dry.   Neurological:      Mental Status: She is alert.          @[facility]@    Assessment and Plan     Assessment/Plan:  Diagnoses and all orders for this visit:    1. Skin infection (Primary)  -     cephalexin (Keflex) 250 MG capsule; Take 1 capsule by mouth 4 (Four) Times a Day for 5 days.  Dispense: 20 capsule; Refill: 0    -Skin on right hand middle finger is dry and peeling.  Slightly erythematous, no edema.  Slightly tender upon palpation.  Area improving.  -Discussed signs and symptoms of worsening infection such as warmth, worsening redness, increasing pain, and drainage.  -Cephalexin prescription sent to pharmacy.  Discussed with Huma to only fill and take prescription if symptoms seem to worsen over the weekend.  -If symptoms worsen and antibiotic is initiated, call office for follow-up next Monday or Tuesday.    There are no Patient Instructions on file for this visit.  Plan of care reviewed with patient at the conclusion of today's visit. Education was provided regarding diagnosis, management and any " prescribed or recommended OTC medications.  Patient verbalizes understanding of and agreement with management plan.    Follow Up  Return for Next Scheduled Follow up.    Pamella Birmingham, APRN

## 2023-04-18 ENCOUNTER — TELEPHONE (OUTPATIENT)
Dept: INTERNAL MEDICINE | Facility: CLINIC | Age: 63
End: 2023-04-18

## 2023-04-18 ENCOUNTER — OFFICE VISIT (OUTPATIENT)
Dept: INTERNAL MEDICINE | Facility: CLINIC | Age: 63
End: 2023-04-18
Payer: COMMERCIAL

## 2023-04-18 VITALS
SYSTOLIC BLOOD PRESSURE: 128 MMHG | DIASTOLIC BLOOD PRESSURE: 86 MMHG | HEART RATE: 80 BPM | BODY MASS INDEX: 26.12 KG/M2 | TEMPERATURE: 99.1 F | HEIGHT: 65 IN | WEIGHT: 156.8 LBS

## 2023-04-18 DIAGNOSIS — J30.9 ALLERGIC RHINITIS, UNSPECIFIED SEASONALITY, UNSPECIFIED TRIGGER: Chronic | ICD-10-CM

## 2023-04-18 DIAGNOSIS — R73.03 PREDIABETES: Primary | Chronic | ICD-10-CM

## 2023-04-18 DIAGNOSIS — E66.3 OVERWEIGHT (BMI 25.0-29.9): ICD-10-CM

## 2023-04-18 DIAGNOSIS — I10 ESSENTIAL HYPERTENSION: Chronic | ICD-10-CM

## 2023-04-18 DIAGNOSIS — E55.9 VITAMIN D DEFICIENCY: Chronic | ICD-10-CM

## 2023-04-18 DIAGNOSIS — M51.36 DDD (DEGENERATIVE DISC DISEASE), LUMBAR: Chronic | ICD-10-CM

## 2023-04-18 DIAGNOSIS — F41.9 ANXIETY: ICD-10-CM

## 2023-04-18 LAB
EXPIRATION DATE: NORMAL
HBA1C MFR BLD: 6.4 %
Lab: NORMAL

## 2023-04-18 RX ORDER — CETIRIZINE HYDROCHLORIDE 10 MG/1
10 TABLET ORAL DAILY
Qty: 90 TABLET | Refills: 1 | Status: SHIPPED | OUTPATIENT
Start: 2023-04-18

## 2023-04-18 RX ORDER — LISINOPRIL 10 MG/1
10 TABLET ORAL DAILY
Qty: 90 TABLET | Refills: 1 | Status: SHIPPED | OUTPATIENT
Start: 2023-04-18

## 2023-04-18 RX ORDER — ONDANSETRON 4 MG/1
4 TABLET, FILM COATED ORAL EVERY 4 HOURS PRN
Qty: 30 TABLET | Refills: 1 | Status: SHIPPED | OUTPATIENT
Start: 2023-04-18

## 2023-04-18 RX ORDER — MELOXICAM 15 MG/1
15 TABLET ORAL DAILY
Qty: 30 TABLET | Refills: 1 | Status: SHIPPED | OUTPATIENT
Start: 2023-04-18

## 2023-04-18 RX ORDER — MELATONIN
1000 DAILY
Qty: 90 TABLET | Refills: 1 | Status: SHIPPED | OUTPATIENT
Start: 2023-04-18

## 2023-04-18 RX ORDER — OMEPRAZOLE 20 MG/1
20 CAPSULE, DELAYED RELEASE ORAL DAILY
Qty: 90 CAPSULE | Refills: 1 | Status: SHIPPED | OUTPATIENT
Start: 2023-04-18

## 2023-04-18 RX ORDER — HYDROXYZINE HYDROCHLORIDE 10 MG/1
10 TABLET, FILM COATED ORAL EVERY 8 HOURS PRN
Qty: 90 TABLET | Refills: 1 | Status: SHIPPED | OUTPATIENT
Start: 2023-04-18 | End: 2023-04-18

## 2023-04-18 RX ORDER — HYDROXYZINE HYDROCHLORIDE 10 MG/1
10 TABLET, FILM COATED ORAL EVERY 8 HOURS PRN
Qty: 90 TABLET | Refills: 1 | Status: SHIPPED | OUTPATIENT
Start: 2023-04-18

## 2023-04-18 NOTE — TELEPHONE ENCOUNTER
Alicia with [facility] pharmacy called to clarify the patient's Hydroxyzine instructions stating that it has two sets -    Take 1 tablet by mouth Every 8 (Eight) Hours As Needed for Anxiety    Take 10-30 mg PO every 4-6 hours PRN for sleep/anxiety        The previously filled prescription had the instructions -    1 to 3 tablets every 4 to 6 hours as needed for sleep/anxiety      Please clarify and call back   [phone]

## 2023-04-18 NOTE — PROGRESS NOTES
"Chief Complaint  Huma Montano is a 62 y.o. female presenting for Prediabetes (Fu ) and Hypertension.     From [place]. Lived in [place] for many years. , lives by herself - daughter lives with her PRN. She has 3 adult children. 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     Patient has a past medical history of hypertension, hyperlipidemia, Graves' disease s/p radioactive iodine ablation on Synthroid, prediabetes, MELISSA on CPAP, allergic rhinitis, ADHD (was on Adderall, but psych didn't continue due to other controlled meds).  She has chronic musculoskeletal pain related to multiple injuries and scoliosis, spinal stenosis, s/p spinal surgery, followed by pain management.    History of Present Illness  Patient is here for follow-up.    She is following with [facility] orthopedics.  It looks like they referred her to rheumatology on 3/23/2023, she has not heard back for appointment yet.  She tells me there was also concern for a torn meniscus on the left knee.    She is otherwise doing fairly well.  She does try to focus on her diet.    She is requesting refills on medication.  She has not been using Atarax often, she does have some anxiety at times and it also has helped for her sleep.  She saw Wendy Feng from psychiatry, she used to be on Adderall in the past, but they did not recommend to continue due to other controlled medications.    The following portions of the patient's history were reviewed and updated as appropriate: allergies, current medications, past family history, past medical history, past social history, past surgical history and problem list.    Objective  /86 (BP Location: Left arm, Patient Position: Sitting)   Pulse 80   Temp 99.1 °F (37.3 °C) (Temporal)   Ht 165.1 cm (65\")   Wt 71.1 kg (156 lb 12.8 oz)   BMI 26.09 kg/m²     Physical Exam  Constitutional:       Appearance: Normal appearance.   HENT:    "   Head: Normocephalic and atraumatic.   Eyes:      Extraocular Movements: Extraocular movements intact.      Conjunctiva/sclera: Conjunctivae normal.   Pulmonary:      Effort: Pulmonary effort is normal. No respiratory distress.   Musculoskeletal:      Cervical back: Neck supple.   Neurological:      Mental Status: She is alert and oriented to person, place, and time. Mental status is at baseline.   Psychiatric:         Behavior: Behavior normal.         Thought Content: Thought content normal.         Assessment/Plan   1. Prediabetes  Hemoglobin A1C   Date Value Ref Range Status   04/18/2023 6.4 % Final   10/05/2022 6.00 (H) 4.80 - 5.60 % Final   11/24/2021 6.35 (H) 4.80 - 5.60 % Final   03/26/2017 5.90 (H) 4.80 - 5.60 % Final   Worsening glycemic control.  She is on the border of diabetes.  For that reason I strongly recommend trial of metformin to see if she tolerates.  Patient agreeable.  We will start at 500 mg twice daily with breakfast and dinner.  Discussed side effects including gastrointestinal upset, nausea, loose bowels and potentially diarrhea.  If any significant symptoms I recommend to try at least 1 tablet daily with breakfast, but if she does not tolerate Tylenol we can try the extended release metformin.  If she does not tolerate any of these then we would have to look at other options.  Most significant side effect is typically diarrhea, that leads to patients not being able to use this long-term.  Also counseled on cutting back carbohydrates and sugary drinks.  - POC Glycosylated Hemoglobin (Hb A1C)  - metFORMIN (Glucophage) 500 MG tablet; Take 1 tablet by mouth 2 (Two) Times a Day With Meals.  Dispense: 180 tablet; Refill: 0    2. Allergic rhinitis, unspecified seasonality, unspecified trigger  - cetirizine (zyrTEC) 10 MG tablet; Take 1 tablet by mouth Daily.  Dispense: 90 tablet; Refill: 1    3. DDD (degenerative disc disease), lumbar  Stable.  Follows with pain management.  Continue on current  medications.  - meloxicam (MOBIC) 15 MG tablet; Take 1 tablet by mouth Daily. with food.  Dispense: 30 tablet; Refill: 1  - omeprazole (priLOSEC) 20 MG capsule; Take 1 capsule by mouth Daily.  Dispense: 90 capsule; Refill: 1  - ondansetron (ZOFRAN) 4 MG tablet; Take 1 tablet by mouth Every 4 (Four) Hours As Needed for Nausea or Vomiting (CAN TAKE EVERY 4-6 HRS PRN).  Dispense: 30 tablet; Refill: 1    4. Essential hypertension  BP Readings from Last 3 Encounters:   04/18/23 128/86   04/14/23 108/76   11/30/22 126/88   Blood pressure is borderline in the office, but overall fairly well controlled.  Continue on current medications  - lisinopril (PRINIVIL,ZESTRIL) 10 MG tablet; Take 1 tablet by mouth Daily.  Dispense: 90 tablet; Refill: 1    5. Vitamin D deficiency  Continue on vitamin D.  We will recheck on next blood  - cholecalciferol (VITAMIN D3) 25 MCG (1000 UT) tablet; Take 1 tablet by mouth Daily.  Dispense: 90 tablet; Refill: 1    6. Anxiety  Continue as needed use  - hydrOXYzine (ATARAX) 10 MG tablet; Take 1 tablet by mouth Every 8 (Eight) Hours As Needed for Anxiety. Dispense: 90 tablet; Refill: 1    7. Overweight (BMI 25.0-29.9)  BMI is >= 25 and <30. (Overweight) The following options were offered after discussion;: exercise counseling/recommendations and nutrition counseling/recommendations    Return in about 13 weeks (around 7/18/2023) for Recheck.    Future Appointments       Provider Department Center    7/18/2023 1:30 PM Morgan Wong MD [facility]IC[facility]    9/8/2023 8:00 AM [facility] BR FOLLOW-UP [facility] [address]            Morgan Wong MD  Family Medicine  04/18/2023

## 2023-05-06 DIAGNOSIS — M54.16 LUMBAR BACK PAIN WITH RADICULOPATHY AFFECTING RIGHT LOWER EXTREMITY: Chronic | ICD-10-CM

## 2023-05-08 NOTE — TELEPHONE ENCOUNTER
Rx Refill Note  Requested Prescriptions     Pending Prescriptions Disp Refills   • Diclofenac Sodium (VOLTAREN) 1 % gel gel [Pharmacy Med Name: DICLOFENAC SODIUM 1% GEL] 400 g      Sig: APPLY 2 TO 4 GRAMS TO THE APPROPRIATE AREA AS DIRECTED TWO TIMES A DAY      Last office visit with prescribing clinician: 4/18/2023   Last telemedicine visit with prescribing clinician: 7/18/2023   Next office visit with prescribing clinician: 7/18/2023                         Would you like a call back once the refill request has been completed: [] Yes [] No    If the office needs to give you a call back, can they leave a voicemail: [] Yes [] No    Julianne Sanchez LPN  05/08/23, 08:33 EDT

## 2023-05-18 NOTE — TELEPHONE ENCOUNTER
"Provider:  John  Caller: Huma Montano  Time of call:  1PM  Phone #:  [phone]  Surgery:  L5-S1 OSTEOTOMY; L2-L3,L3-L4 AND L5-S1 INTERBODY FUSIONS; L2 TO S1 POSTERIOR FUSION WITH PEDICLE SCREWS AND BONE GRAFT  Surgery Date:  04/04/17  Last visit:   06/21/17  Next visit: to f/u in September    Reason for call:         Pt left hand-written message stating that her previous tramadol refill was for an insufficient amount considering the frequency. \"50 mg, 2 pills 3 times a day, #60, 3 refills.\" was called in, however she believes it should have been #180. If this is in error I can change it, otherwise I will inform her that this was intentional and she can request a refill if needed when she is out.   " Spinal

## 2023-05-26 ENCOUNTER — PATIENT MESSAGE (OUTPATIENT)
Dept: INTERNAL MEDICINE | Facility: CLINIC | Age: 63
End: 2023-05-26
Payer: COMMERCIAL

## 2023-05-26 DIAGNOSIS — R73.03 PREDIABETES: Primary | Chronic | ICD-10-CM

## 2023-05-26 RX ORDER — METFORMIN HYDROCHLORIDE 500 MG/1
500 TABLET, EXTENDED RELEASE ORAL
Qty: 30 TABLET | Refills: 0 | Status: SHIPPED | OUTPATIENT
Start: 2023-05-26

## 2023-06-15 DIAGNOSIS — F41.9 ANXIETY: ICD-10-CM

## 2023-06-15 RX ORDER — HYDROXYZINE HYDROCHLORIDE 10 MG/1
TABLET, FILM COATED ORAL
Qty: 90 TABLET | Refills: 1 | Status: SHIPPED | OUTPATIENT
Start: 2023-06-15

## 2023-06-15 NOTE — TELEPHONE ENCOUNTER
Rx Refill Note  Requested Prescriptions     Pending Prescriptions Disp Refills   • hydrOXYzine (ATARAX) 10 MG tablet [Pharmacy Med Name: hydrOXYzine HCL 10 MG TABLET] 90 tablet 1     Sig: TAKE ONE TABLET BY MOUTH EVERY 8 HOURS AS NEEDED FOR ANXIETY      Last office visit with prescribing clinician: 4/18/2023   Next office visit with prescribing clinician: 7/18/2023                         Would you like a call back once the refill request has been completed: [] Yes [] No    If the office needs to give you a call back, can they leave a voicemail: [] Yes [] No    Laura Haddad LPN  06/15/23, 09:22 EDT

## 2023-07-23 ENCOUNTER — PATIENT MESSAGE (OUTPATIENT)
Dept: INTERNAL MEDICINE | Facility: CLINIC | Age: 63
End: 2023-07-23
Payer: COMMERCIAL

## 2023-07-23 DIAGNOSIS — M51.36 DDD (DEGENERATIVE DISC DISEASE), LUMBAR: Chronic | ICD-10-CM

## 2023-07-24 NOTE — TELEPHONE ENCOUNTER
From: Huma Montano  To: Morgan Wong  Sent: 7/23/2023 11:49 AM EDT  Subject: Meloxicam     Thank you for the 90 day medication refills at my 07-18-23 OV. My Meloxicam wasn't refilled and I noticed on my last refill, the bottle stated, **We need blood test before longer refills**. This is for my arthritis and I had a rheumatoid factor lab completed on 03-23-23. I would like to request another 90 day refill. Please advise if addl labwork is needed.    Thank you-Huma Montano

## 2023-07-26 RX ORDER — MELOXICAM 15 MG/1
15 TABLET ORAL DAILY
Qty: 90 TABLET | Refills: 0 | Status: SHIPPED | OUTPATIENT
Start: 2023-07-26

## 2023-07-27 DIAGNOSIS — R73.03 PREDIABETES: Chronic | ICD-10-CM

## 2023-07-27 RX ORDER — METFORMIN HYDROCHLORIDE 500 MG/1
TABLET, EXTENDED RELEASE ORAL
Qty: 30 TABLET | Refills: 0 | Status: SHIPPED | OUTPATIENT
Start: 2023-07-27 | End: 2023-07-31 | Stop reason: SDUPTHER

## 2023-07-31 ENCOUNTER — LAB (OUTPATIENT)
Dept: LAB | Facility: HOSPITAL | Age: 63
End: 2023-07-31
Payer: COMMERCIAL

## 2023-07-31 DIAGNOSIS — E78.2 MIXED HYPERLIPIDEMIA: Chronic | ICD-10-CM

## 2023-07-31 DIAGNOSIS — E55.9 VITAMIN D DEFICIENCY: Chronic | ICD-10-CM

## 2023-07-31 DIAGNOSIS — Z13.21 ENCOUNTER FOR VITAMIN DEFICIENCY SCREENING: ICD-10-CM

## 2023-07-31 DIAGNOSIS — R73.03 PREDIABETES: Chronic | ICD-10-CM

## 2023-07-31 DIAGNOSIS — I10 ESSENTIAL HYPERTENSION: Chronic | ICD-10-CM

## 2023-07-31 DIAGNOSIS — E89.0 POSTABLATIVE HYPOTHYROIDISM: Chronic | ICD-10-CM

## 2023-07-31 LAB
25(OH)D3 SERPL-MCNC: 35.5 NG/ML (ref 30–100)
ALBUMIN SERPL-MCNC: 4.7 G/DL (ref 3.5–5.2)
ALBUMIN/GLOB SERPL: 2 G/DL
ALP SERPL-CCNC: 94 U/L (ref 39–117)
ALT SERPL W P-5'-P-CCNC: 22 U/L (ref 1–33)
ANION GAP SERPL CALCULATED.3IONS-SCNC: 14.9 MMOL/L (ref 5–15)
AST SERPL-CCNC: 20 U/L (ref 1–32)
BILIRUB SERPL-MCNC: 0.7 MG/DL (ref 0–1.2)
BUN SERPL-MCNC: 30 MG/DL (ref 8–23)
BUN/CREAT SERPL: 29.7 (ref 7–25)
CALCIUM SPEC-SCNC: 10.3 MG/DL (ref 8.6–10.5)
CHLORIDE SERPL-SCNC: 101 MMOL/L (ref 98–107)
CHOLEST SERPL-MCNC: 167 MG/DL (ref 0–200)
CO2 SERPL-SCNC: 25.1 MMOL/L (ref 22–29)
CREAT SERPL-MCNC: 1.01 MG/DL (ref 0.57–1)
EGFRCR SERPLBLD CKD-EPI 2021: 63.1 ML/MIN/1.73
GLOBULIN UR ELPH-MCNC: 2.4 GM/DL
GLUCOSE SERPL-MCNC: 91 MG/DL (ref 65–99)
HDLC SERPL-MCNC: 66 MG/DL (ref 40–60)
LDLC SERPL CALC-MCNC: 88 MG/DL (ref 0–100)
LDLC/HDLC SERPL: 1.33 {RATIO}
POTASSIUM SERPL-SCNC: 4.1 MMOL/L (ref 3.5–5.2)
PROT SERPL-MCNC: 7.1 G/DL (ref 6–8.5)
SODIUM SERPL-SCNC: 141 MMOL/L (ref 136–145)
T4 FREE SERPL-MCNC: 2.07 NG/DL (ref 0.93–1.7)
TRIGL SERPL-MCNC: 65 MG/DL (ref 0–150)
TSH SERPL DL<=0.05 MIU/L-ACNC: 0.35 UIU/ML (ref 0.27–4.2)
VIT B12 BLD-MCNC: 585 PG/ML (ref 211–946)
VLDLC SERPL-MCNC: 13 MG/DL (ref 5–40)

## 2023-07-31 PROCEDURE — 84439 ASSAY OF FREE THYROXINE: CPT

## 2023-07-31 PROCEDURE — 80053 COMPREHEN METABOLIC PANEL: CPT

## 2023-07-31 PROCEDURE — 82607 VITAMIN B-12: CPT

## 2023-07-31 PROCEDURE — 80061 LIPID PANEL: CPT

## 2023-07-31 PROCEDURE — 82306 VITAMIN D 25 HYDROXY: CPT

## 2023-07-31 PROCEDURE — 84443 ASSAY THYROID STIM HORMONE: CPT

## 2023-07-31 RX ORDER — METFORMIN HYDROCHLORIDE 500 MG/1
500 TABLET, EXTENDED RELEASE ORAL
Qty: 90 TABLET | Refills: 0 | Status: SHIPPED | OUTPATIENT
Start: 2023-07-31

## 2023-08-01 DIAGNOSIS — R73.03 PREDIABETES: Primary | ICD-10-CM

## 2023-08-01 DIAGNOSIS — E89.0 POSTABLATIVE HYPOTHYROIDISM: ICD-10-CM

## 2023-08-25 ENCOUNTER — OFFICE VISIT (OUTPATIENT)
Dept: INTERNAL MEDICINE | Facility: CLINIC | Age: 63
End: 2023-08-25
Payer: COMMERCIAL

## 2023-08-25 ENCOUNTER — LAB (OUTPATIENT)
Dept: LAB | Facility: HOSPITAL | Age: 63
End: 2023-08-25
Payer: COMMERCIAL

## 2023-08-25 VITALS
BODY MASS INDEX: 25.33 KG/M2 | DIASTOLIC BLOOD PRESSURE: 76 MMHG | WEIGHT: 152 LBS | HEART RATE: 80 BPM | SYSTOLIC BLOOD PRESSURE: 116 MMHG | TEMPERATURE: 98.2 F | HEIGHT: 65 IN

## 2023-08-25 DIAGNOSIS — R73.03 PREDIABETES: ICD-10-CM

## 2023-08-25 DIAGNOSIS — E89.0 POSTABLATIVE HYPOTHYROIDISM: ICD-10-CM

## 2023-08-25 DIAGNOSIS — Z01.818 PREOP EXAMINATION: Primary | ICD-10-CM

## 2023-08-25 DIAGNOSIS — R73.03 PREDIABETES: Chronic | ICD-10-CM

## 2023-08-25 DIAGNOSIS — Z01.818 PREOP EXAMINATION: ICD-10-CM

## 2023-08-25 LAB
APTT PPP: 29.3 SECONDS (ref 22–39)
BASOPHILS # BLD AUTO: 0.1 10*3/MM3 (ref 0–0.2)
BASOPHILS NFR BLD AUTO: 1.3 % (ref 0–1.5)
DEPRECATED RDW RBC AUTO: 41.1 FL (ref 37–54)
EOSINOPHIL # BLD AUTO: 0.48 10*3/MM3 (ref 0–0.4)
EOSINOPHIL NFR BLD AUTO: 6.4 % (ref 0.3–6.2)
ERYTHROCYTE [DISTWIDTH] IN BLOOD BY AUTOMATED COUNT: 11.3 % (ref 12.3–15.4)
HCT VFR BLD AUTO: 47.4 % (ref 34–46.6)
HGB BLD-MCNC: 16 G/DL (ref 12–15.9)
IMM GRANULOCYTES # BLD AUTO: 0.02 10*3/MM3 (ref 0–0.05)
IMM GRANULOCYTES NFR BLD AUTO: 0.3 % (ref 0–0.5)
INR PPP: 0.89 (ref 0.89–1.12)
LYMPHOCYTES # BLD AUTO: 1.58 10*3/MM3 (ref 0.7–3.1)
LYMPHOCYTES NFR BLD AUTO: 21 % (ref 19.6–45.3)
MCH RBC QN AUTO: 33.6 PG (ref 26.6–33)
MCHC RBC AUTO-ENTMCNC: 33.8 G/DL (ref 31.5–35.7)
MCV RBC AUTO: 99.6 FL (ref 79–97)
MONOCYTES # BLD AUTO: 0.85 10*3/MM3 (ref 0.1–0.9)
MONOCYTES NFR BLD AUTO: 11.3 % (ref 5–12)
NEUTROPHILS NFR BLD AUTO: 4.5 10*3/MM3 (ref 1.7–7)
NEUTROPHILS NFR BLD AUTO: 59.7 % (ref 42.7–76)
NRBC BLD AUTO-RTO: 0 /100 WBC (ref 0–0.2)
PLATELET # BLD AUTO: 321 10*3/MM3 (ref 140–450)
PMV BLD AUTO: 11.1 FL (ref 6–12)
PREALB SERPL-MCNC: 30.1 MG/DL (ref 20–40)
PROTHROMBIN TIME: 12.1 SECONDS (ref 12.2–14.5)
RBC # BLD AUTO: 4.76 10*6/MM3 (ref 3.77–5.28)
WBC NRBC COR # BLD: 7.53 10*3/MM3 (ref 3.4–10.8)

## 2023-08-25 PROCEDURE — 93000 ELECTROCARDIOGRAM COMPLETE: CPT | Performed by: STUDENT IN AN ORGANIZED HEALTH CARE EDUCATION/TRAINING PROGRAM

## 2023-08-25 PROCEDURE — 84439 ASSAY OF FREE THYROXINE: CPT

## 2023-08-25 PROCEDURE — 80305 DRUG TEST PRSMV DIR OPT OBS: CPT

## 2023-08-25 PROCEDURE — 87081 CULTURE SCREEN ONLY: CPT | Performed by: STUDENT IN AN ORGANIZED HEALTH CARE EDUCATION/TRAINING PROGRAM

## 2023-08-25 PROCEDURE — 82985 ASSAY OF GLYCATED PROTEIN: CPT

## 2023-08-25 PROCEDURE — 83036 HEMOGLOBIN GLYCOSYLATED A1C: CPT

## 2023-08-25 PROCEDURE — 84443 ASSAY THYROID STIM HORMONE: CPT

## 2023-08-25 PROCEDURE — 82306 VITAMIN D 25 HYDROXY: CPT

## 2023-08-25 PROCEDURE — 85730 THROMBOPLASTIN TIME PARTIAL: CPT

## 2023-08-25 PROCEDURE — 36415 COLL VENOUS BLD VENIPUNCTURE: CPT

## 2023-08-25 PROCEDURE — 85610 PROTHROMBIN TIME: CPT

## 2023-08-25 PROCEDURE — 80053 COMPREHEN METABOLIC PANEL: CPT

## 2023-08-25 PROCEDURE — 85025 COMPLETE CBC W/AUTO DIFF WBC: CPT

## 2023-08-25 PROCEDURE — 84134 ASSAY OF PREALBUMIN: CPT

## 2023-08-25 PROCEDURE — 99214 OFFICE O/P EST MOD 30 MIN: CPT | Performed by: STUDENT IN AN ORGANIZED HEALTH CARE EDUCATION/TRAINING PROGRAM

## 2023-08-25 RX ORDER — HYDROXYCHLOROQUINE SULFATE 200 MG/1
200 TABLET, FILM COATED ORAL 2 TIMES DAILY
COMMUNITY
Start: 2023-08-17

## 2023-08-25 NOTE — PROGRESS NOTES
Chief Complaint  Huma Montano is a 63 y.o. female presenting for preop evaluation.     From [place]. Lived in [place] for many years. , lives by herself - daughter lives with her PRN. She has 3 adult children. 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     Patient has a past medical history of hypertension, hyperlipidemia, Graves' disease s/p radioactive iodine ablation on Synthroid, prediabetes, MELISSA on CPAP, allergic rhinitis, ADHD (was on Adderall, but psych didn't continue due to other controlled meds).  She has chronic musculoskeletal pain related to multiple injuries and scoliosis, spinal stenosis, s/p spinal surgery, followed by pain management.    History of Present Illness  Patient is here for preop evaluation.    She is scheduled for right knee/TKA at [facility] orthopedics on 9/18/2023.  She saw Ortho yesterday, and she tells me the left knee still looks pretty good.    He is following with rheum and was recently started on hydrochloric when, tolerating well.    Due to her right knee pain she is not able to exercise much.  However when she moves or walks stairs she does not have any chest pain or shortness of breath, she has no history of cardiovascular disease, no heart disease.    Patient never had blood clots, does not have any abnormal bleeding tendency.    She is allergic to Lortab/hydrocodone and erythromycin, no allergies to the jewelry or bandages/dressings.      Outpatient Medications Marked as Taking for the 8/25/23 encounter (Office Visit) with Morgan Wong MD   Medication Sig Dispense Refill    acetaminophen (TYLENOL) 500 MG tablet Take 1 tablet by mouth Every 6 (Six) Hours As Needed for Mild Pain.      atorvastatin (LIPITOR) 80 MG tablet Take 1 tablet by mouth Every Night. At bedtime 90 tablet 1    cetirizine (zyrTEC) 10 MG tablet Take 1 tablet by mouth Daily. 90 tablet 1    cholecalciferol (VITAMIN D3)  25 MCG (1000 UT) tablet Take 1 tablet by mouth Daily. 90 tablet 1    Diclofenac Sodium (VOLTAREN) 1 % gel gel APPLY 2 TO 4 GRAMS TO THE APPROPRIATE AREA AS DIRECTED TWO TIMES A  g 1    ESTRADIOL-PROGESTERONE PO Take  by mouth Daily. Estrodiol 2 mg - progesterone 100 mg -testosterone 3 mg  daily      fluticasone (FLONASE) 50 MCG/ACT nasal spray 2 sprays into the nostril(s) as directed by provider Daily. 1-2 sprays daily 54.6 mL 3    folic acid (FOLVITE) 400 MCG tablet Take 1 tablet by mouth Daily.      gabapentin (NEURONTIN) 600 MG tablet Take 1 tablet by mouth 2 (Two) Times a Day. 1 tablet 0    hydroxychloroquine (PLAQUENIL) 200 MG tablet Take 1 tablet by mouth 2 (Two) Times a Day.      hydrOXYzine (ATARAX) 10 MG tablet TAKE ONE TABLET BY MOUTH EVERY 8 HOURS AS NEEDED FOR ANXIETY 90 tablet 1    levothyroxine (SYNTHROID, LEVOTHROID) 112 MCG tablet Take 1 tablet by mouth Daily. 90 tablet 1    lisinopril (PRINIVIL,ZESTRIL) 10 MG tablet Take 1 tablet by mouth Daily. 90 tablet 1    meloxicam (MOBIC) 15 MG tablet Take 1 tablet by mouth Daily. with food. 90 tablet 0    metFORMIN ER (GLUCOPHAGE-XR) 500 MG 24 hr tablet Take 1 tablet by mouth Daily With Breakfast. 90 tablet 0    Multiple Vitamins-Minerals (MULTIVITAL PO) Take 1 tablet by mouth Daily.      mupirocin (BACTROBAN) 2 % ointment Apply 1 application  topically to the appropriate area as directed Daily. Hasn't started yet      Nutritional Supplements (DHEA PO) Take  by mouth. 1  100 mg capsule 3 times a week      omeprazole (priLOSEC) 20 MG capsule Take 1 capsule by mouth Daily. 90 capsule 1    ondansetron (ZOFRAN) 4 MG tablet Take 1 tablet by mouth Every 4 (Four) Hours As Needed for Nausea or Vomiting (CAN TAKE EVERY 4-6 HRS PRN). 30 tablet 1    traMADol (ULTRAM) 50 MG tablet Take 1 tablet by mouth Every 6 (Six) Hours As Needed for Moderate Pain . 60 tablet 2    traMADol ER (ULTRAM ER) 300 MG 24 hr tablet Take 1 tablet by mouth Daily. 60 tablet 0    Unable to  find HRT compound  4 clicks daily      vitamin B-12 (CYANOCOBALAMIN) 1000 MCG tablet Take 1 tablet by mouth Daily.           Patient Active Problem List   Diagnosis    Cervical pain    Prediabetes    Postablative hypothyroidism    Overweight (BMI 25.0-29.9)    Attention deficit hyperactivity disorder, predominantly inattentive type    Allergic rhinitis    Idiopathic scoliosis of thoracolumbar region    Lumbar back pain with radiculopathy affecting right lower extremity    Spinal stenosis, thoracolumbar region    MELISSA on CPAP    Essential hypertension    DDD (degenerative disc disease), lumbar    Mixed hyperlipidemia    Fibromyalgia    Anxiety    Vitamin D deficiency     Past Medical History:   Diagnosis Date    ADHD (attention deficit hyperactivity disorder)     Allergic     Arthritis     Back pain     Claustrophobia     Colon polyp 10/06/2022    Benign    DDD (degenerative disc disease), lumbar 08/10/2022    Depression     Diabetes mellitus     Essential hypertension 08/10/2022    Fibromyalgia, primary     GERD (gastroesophageal reflux disease)     Prescribed omeprozole    Graves disease     Headache     Heartburn     Hernia of abdominal wall     Hyperlipidemia     Hypertension     Hyperthyroidism 1988    Idiopathic scoliosis of thoracolumbar region 08/10/2022    Injury of back     Kyphosis of cervical region     Low back pain     Mixed hyperlipidemia 08/10/2022    Obesity     MELISSA on CPAP 08/10/2022    Postablative hypothyroidism 2021    S/p I 131 therapy for graves in     Prediabetes     Scoliosis     Sleep apnea     Spinal stenosis     Urinary tract infection     Visual impairment     Wear glasses    Wears glasses      Past Surgical History:   Procedure Laterality Date    ADENOIDECTOMY  1963    BREAST BIOPSY Right 12/10/2015     bx    BREAST EXCISIONAL BIOPSY Right 2016     SECTION  1991    CYST REMOVAL      Breast    CYST REMOVAL      Cervical cyst    EYE SURGERY      Lasix    HERNIA  "REPAIR      INGUINAL HERNIA REPAIR      LUMBAR LAMINECTOMY WITH FUSION N/A 04/04/2017    Procedure: L5-S1 OSTEOTOMY; L2-L3,L3-L4 AND L5-S1 INTERBODY FUSIONS; L2 TO S1 POSTERIOR FUSION WITH PEDICLE SCREWS AND BONE GRAFT;  Surgeon: Connor Lopez MD;  Location: [facility];  Service:     MENISCECTOMY  2020    ROTATOR CUFF REPAIR      3x    SEPTOPLASTY      SEPTOPLASTY  2014    Bilateral    SPINAL FUSION  2010    STEROID INJECTION      LUMBAR    TONSILLECTOMY      TUBAL ABDOMINAL LIGATION  1993    UMBILICAL HERNIA REPAIR  2018         Recent Labs:    CMP  Lab Results   Component Value Date    GLUCOSE 91 07/31/2023    BUN 30 (H) 07/31/2023    CREATININE 1.01 (H) 07/31/2023    EGFRIFNONA 64 08/15/2018    BCR 29.7 (H) 07/31/2023    K 4.1 07/31/2023    CO2 25.1 07/31/2023    CALCIUM 10.3 07/31/2023    AST 20 07/31/2023    ALT 22 07/31/2023        CBC w/DIFF  Lab Results   Component Value Date    WBC 9.18 08/15/2018    RBC 4.81 08/15/2018    HGB 15.8 (H) 08/15/2018    HCT 47.4 (H) 08/15/2018    MCV 98.5 08/15/2018    MCH 32.8 (H) 08/15/2018    MCHC 33.3 08/15/2018    RDW 13.3 08/15/2018    RDWSD 47.7 08/15/2018    MPV 10.5 08/15/2018     08/15/2018    NEUTRORELPCT 79.2 (H) 04/05/2017    LYMPHORELPCT 10.2 (L) 04/05/2017    MONORELPCT 9.9 04/05/2017    EOSRELPCT 0.2 04/05/2017    BASORELPCT 0.1 04/05/2017    AUTOIGPER 0.4 04/05/2017    NEUTROABS 14.35 (H) 04/05/2017    LYMPHSABS 1.84 04/05/2017    MONOSABS 1.80 (H) 04/05/2017    EOSABS 0.04 (L) 04/05/2017    BASOSABS 0.02 04/05/2017    AUTOIGNUM 0.07 (H) 04/05/2017       No results found for: INR    Hemoglobin A1C   Date Value Ref Range Status   07/18/2023 5.9 % Final   10/05/2022 6.00 (H) 4.80 - 5.60 % Final        Objective  /76 (BP Location: Left arm, Patient Position: Sitting, Cuff Size: Adult)   Pulse 80   Temp 98.2 øF (36.8 øC) (Temporal)   Ht 165.1 cm (65\")   Wt 68.9 kg (152 lb)   BMI 25.29 kg/mý     Physical Exam  Vitals reviewed.   Constitutional:     "   Appearance: Normal appearance.   HENT:      Head: Normocephalic and atraumatic.      Nose: Nose normal. No congestion.      Mouth/Throat:      Mouth: Mucous membranes are moist.      Pharynx: Oropharynx is clear.      Comments: No loose teeth or dentures.  Eyes:      Extraocular Movements: Extraocular movements intact.      Conjunctiva/sclera: Conjunctivae normal.   Cardiovascular:      Rate and Rhythm: Normal rate and regular rhythm.      Heart sounds: Normal heart sounds. No murmur heard.  Pulmonary:      Effort: Pulmonary effort is normal. No respiratory distress.      Breath sounds: Normal breath sounds. No wheezing.   Abdominal:      General: There is no distension.      Palpations: Abdomen is soft. There is no mass.      Tenderness: There is no abdominal tenderness.   Musculoskeletal:      Cervical back: Neck supple. No tenderness.      Right lower leg: No edema.      Left lower leg: No edema.   Lymphadenopathy:      Cervical: No cervical adenopathy.   Skin:     General: Skin is warm and dry.   Neurological:      General: No focal deficit present.      Mental Status: She is alert and oriented to person, place, and time. Mental status is at baseline.      Motor: No weakness.      Gait: Gait normal.   Psychiatric:         Behavior: Behavior normal.         Thought Content: Thought content normal.       Revised Cardiac Risk Index    RISK FACTOR          POINTS    Cerebrovascular disease         No  Congestive heart failure         No  Creatinine level > 2.0 mg per dL (176.80 ?mol per L)     No  Diabetes mellitus requiring insulin        No  Ischemic cardiac disease         No  Suprainguinal vascular surgery, intrathoracic surgery, or intra-abdominal surgery  No    Total points:  0             RISK OF MAJOR CARDIAC EVENT  POINTS RISK % (95% CONFIDENCE INTERVAL)  0   0.4 (0.05 to 1.5)  1  0.9 (0.3 to 2.1)  2  6.6 (3.9 to 10.3)  ? 3  ?11 (5.8 to 18.4)      Functional Status Assessment (METs - metabolic  Equivalents):  Excellent (>7 METs)  Squash Jogging (10-minute mile) Scrubbing floors Singles tennis    Moderate (4 to 7 METs)  Cycling Climbing a flight of stairs Golf (without cart) Walking 4 mph Yardwork (e.g., raking leaves, weeding, pushing a power mower)    Poor (<4 METs)  Vacuuming Activities of daily living (e.g., eating, dressing, bathing) Walking 2 mph Writing    METs score for Huma Montano: 5 - limited by R knee pain/OA      Risk of Cardiac Death and Nonfatal Myocardial Infarction for Noncardiac Surgical Procedures  RISK OF PROCEDURE EXAMPLES    High (> 5%)   Aortic and major vascular surgery, peripheral vascular surgery    Intermediate (1 to 5%) Intraperitoneal or intrathoracic surgery, carotid endarterectomy, head and neck surgery, orthopedic    surgery, prostate surgery    Low (< 1%)   Ambulatory surgery, breast surgery, endoscopic procedures, superficial procedures, cataract surgery      ECG 12 Lead    Date/Time: 8/25/2023 1:19 PM  Performed by: Morgan Wong MD  Authorized by: Morgan Wong MD   Comparison: compared with previous ECG from 7/17/2016  Similar to previous ECG  Rhythm: sinus rhythm  Rate: normal  BPM: 78  ST Segments: ST segments normal  T Waves: T waves normal  QRS axis: normal    Clinical impression: non-specific ECG  Comments: No significant changes compared to EKG from 2017.  Possible mild left atrial enlargement.          Patient Risk: Intermediate (1 to 5%)    Assessment/Plan   1. Preop examination  Patient with low cardiovascular risk, with intermediate risk based on orthopedic surgery procedure.  Based on this I do not have any concerns for her to proceed with right TKA.  We will fax over results on blood work comes back.  - CBC & Differential; Future  - Comprehensive Metabolic Panel; Future  - Protime-INR; Future  - aPTT; Future  - Prealbumin; Future  - Nicotine Screen, Urine - Urine, Clean Catch; Future  - MRSA Screen Culture (Outpatient) - Swab, Nares; Future  -  Vitamin D,25-Hydroxy; Future    2. Prediabetes  - Fructosamine; Future      No follow-ups on file.    Morgan Wong MD  Family Medicine  08/25/2023

## 2023-08-26 LAB
25(OH)D3 SERPL-MCNC: 24.1 NG/ML (ref 30–100)
ALBUMIN SERPL-MCNC: 4.5 G/DL (ref 3.5–5.2)
ALBUMIN/GLOB SERPL: 1.8 G/DL
ALP SERPL-CCNC: 91 U/L (ref 39–117)
ALT SERPL W P-5'-P-CCNC: 17 U/L (ref 1–33)
ANION GAP SERPL CALCULATED.3IONS-SCNC: 14 MMOL/L (ref 5–15)
AST SERPL-CCNC: 22 U/L (ref 1–32)
BILIRUB SERPL-MCNC: 0.6 MG/DL (ref 0–1.2)
BUN SERPL-MCNC: 21 MG/DL (ref 8–23)
BUN/CREAT SERPL: 19.4 (ref 7–25)
CALCIUM SPEC-SCNC: 10.5 MG/DL (ref 8.6–10.5)
CHLORIDE SERPL-SCNC: 101 MMOL/L (ref 98–107)
CO2 SERPL-SCNC: 27 MMOL/L (ref 22–29)
CREAT SERPL-MCNC: 1.08 MG/DL (ref 0.57–1)
EGFRCR SERPLBLD CKD-EPI 2021: 57.8 ML/MIN/1.73
FRUCTOSAMINE SERPL-SCNC: 226 UMOL/L (ref 0–285)
GLOBULIN UR ELPH-MCNC: 2.5 GM/DL
GLUCOSE SERPL-MCNC: 71 MG/DL (ref 65–99)
HBA1C MFR BLD: 6 % (ref 4.8–5.6)
MRSA SPEC QL CULT: NORMAL
POTASSIUM SERPL-SCNC: 4.2 MMOL/L (ref 3.5–5.2)
PROT SERPL-MCNC: 7 G/DL (ref 6–8.5)
SODIUM SERPL-SCNC: 142 MMOL/L (ref 136–145)
T4 FREE SERPL-MCNC: 2.03 NG/DL (ref 0.93–1.7)
TSH SERPL DL<=0.05 MIU/L-ACNC: 0.12 UIU/ML (ref 0.27–4.2)

## 2023-08-28 DIAGNOSIS — R73.03 PREDIABETES: Chronic | ICD-10-CM

## 2023-08-28 DIAGNOSIS — E89.0 POSTABLATIVE HYPOTHYROIDISM: Chronic | ICD-10-CM

## 2023-08-28 LAB
COTININE UR QL SCN: NEGATIVE NG/ML
Lab: NORMAL

## 2023-08-28 RX ORDER — LEVOTHYROXINE SODIUM 112 UG/1
112 TABLET ORAL DAILY
Qty: 90 TABLET | Refills: 1 | COMMUNITY
Start: 2023-08-28

## 2023-08-28 RX ORDER — METFORMIN HYDROCHLORIDE 500 MG/1
500 TABLET, EXTENDED RELEASE ORAL
Qty: 90 TABLET | Refills: 5 | Status: SHIPPED | OUTPATIENT
Start: 2023-08-28

## 2023-09-08 ENCOUNTER — HOSPITAL ENCOUNTER (OUTPATIENT)
Dept: ULTRASOUND IMAGING | Facility: HOSPITAL | Age: 63
Discharge: HOME | End: 2023-09-08
Payer: COMMERCIAL

## 2023-09-08 ENCOUNTER — HOSPITAL ENCOUNTER (OUTPATIENT)
Dept: MAMMOGRAPHY | Facility: HOSPITAL | Age: 63
Discharge: HOME | End: 2023-09-08
Payer: COMMERCIAL

## 2023-09-08 DIAGNOSIS — R92.8 ABNORMAL MAMMOGRAM: ICD-10-CM

## 2023-09-08 PROCEDURE — 76642 ULTRASOUND BREAST LIMITED: CPT

## 2023-09-08 PROCEDURE — G0279 TOMOSYNTHESIS, MAMMO: HCPCS

## 2023-09-08 PROCEDURE — 77066 DX MAMMO INCL CAD BI: CPT

## 2023-09-20 DIAGNOSIS — E89.0 POSTABLATIVE HYPOTHYROIDISM: Chronic | ICD-10-CM

## 2023-09-20 DIAGNOSIS — E78.2 MIXED HYPERLIPIDEMIA: Chronic | ICD-10-CM

## 2023-09-20 RX ORDER — LEVOTHYROXINE SODIUM 112 UG/1
112 TABLET ORAL DAILY
Qty: 90 TABLET | Refills: 1 | Status: SHIPPED | OUTPATIENT
Start: 2023-09-20

## 2023-09-20 RX ORDER — ATORVASTATIN CALCIUM 80 MG/1
80 TABLET, FILM COATED ORAL NIGHTLY
Qty: 90 TABLET | Refills: 1 | Status: SHIPPED | OUTPATIENT
Start: 2023-09-20

## 2023-09-20 NOTE — TELEPHONE ENCOUNTER
Caller: Rocío Montanoa L    Relationship: Self    Best call back number: [phone]     Requested Prescriptions:   Requested Prescriptions     Pending Prescriptions Disp Refills    atorvastatin (LIPITOR) 80 MG tablet 90 tablet 1     Sig: Take 1 tablet by mouth Every Night. At bedtime    levothyroxine (SYNTHROID, LEVOTHROID) 112 MCG tablet 90 tablet 1     Sig: Take 1 tablet by mouth Daily. 6d/w, 1/2 tablet 1d/w        Pharmacy where request should be sent: [facility] [address] - [phone]  - [phone] FX     Last office visit with prescribing clinician: 8/25/2023   Last telemedicine visit with prescribing clinician: Visit date not found   Next office visit with prescribing clinician: 10/19/2023     Additional details provided by patient:     Does the patient have less than a 3 day supply:  [x] Yes  [] No    Would you like a call back once the refill request has been completed: [] Yes [x] No    If the office needs to give you a call back, can they leave a voicemail: [] Yes [x] No    Cadance Dunaway, RegSched Rep   09/20/23 16:08 EDT

## 2023-09-22 DIAGNOSIS — E78.2 MIXED HYPERLIPIDEMIA: Chronic | ICD-10-CM

## 2023-09-22 DIAGNOSIS — E89.0 POSTABLATIVE HYPOTHYROIDISM: Chronic | ICD-10-CM

## 2023-09-22 RX ORDER — ATORVASTATIN CALCIUM 80 MG/1
TABLET, FILM COATED ORAL
Qty: 90 TABLET | Refills: 1 | OUTPATIENT
Start: 2023-09-22

## 2023-09-22 RX ORDER — LEVOTHYROXINE SODIUM 112 UG/1
TABLET ORAL
Qty: 90 TABLET | Refills: 1 | OUTPATIENT
Start: 2023-09-22

## 2023-10-16 DIAGNOSIS — I10 ESSENTIAL HYPERTENSION: Chronic | ICD-10-CM

## 2023-10-16 RX ORDER — LISINOPRIL 10 MG/1
10 TABLET ORAL DAILY
Qty: 90 TABLET | Refills: 1 | Status: SHIPPED | OUTPATIENT
Start: 2023-10-16 | End: 2023-10-19 | Stop reason: SDUPTHER

## 2023-10-19 ENCOUNTER — OFFICE VISIT (OUTPATIENT)
Dept: INTERNAL MEDICINE | Facility: CLINIC | Age: 63
End: 2023-10-19
Payer: COMMERCIAL

## 2023-10-19 VITALS
SYSTOLIC BLOOD PRESSURE: 118 MMHG | DIASTOLIC BLOOD PRESSURE: 80 MMHG | BODY MASS INDEX: 24.49 KG/M2 | WEIGHT: 147 LBS | HEART RATE: 72 BPM | TEMPERATURE: 97.3 F | HEIGHT: 65 IN

## 2023-10-19 DIAGNOSIS — E89.0 POSTABLATIVE HYPOTHYROIDISM: Chronic | ICD-10-CM

## 2023-10-19 DIAGNOSIS — M51.36 DDD (DEGENERATIVE DISC DISEASE), LUMBAR: Chronic | ICD-10-CM

## 2023-10-19 DIAGNOSIS — Z00.00 WELL ADULT EXAM: Primary | ICD-10-CM

## 2023-10-19 DIAGNOSIS — I10 ESSENTIAL HYPERTENSION: Chronic | ICD-10-CM

## 2023-10-19 DIAGNOSIS — J30.9 ALLERGIC RHINITIS, UNSPECIFIED SEASONALITY, UNSPECIFIED TRIGGER: Chronic | ICD-10-CM

## 2023-10-19 DIAGNOSIS — R79.89 ELEVATED SERUM CREATININE: ICD-10-CM

## 2023-10-19 DIAGNOSIS — E55.9 VITAMIN D DEFICIENCY: Chronic | ICD-10-CM

## 2023-10-19 DIAGNOSIS — R73.03 PREDIABETES: Chronic | ICD-10-CM

## 2023-10-19 PROBLEM — M06.9 RHEUMATOID ARTHRITIS: Chronic | Status: ACTIVE | Noted: 2023-09-01

## 2023-10-19 PROBLEM — G62.9 NEUROPATHY: Chronic | Status: ACTIVE | Noted: 2023-09-01

## 2023-10-19 PROBLEM — E05.00 GRAVES DISEASE: Chronic | Status: ACTIVE | Noted: 2023-09-01

## 2023-10-19 PROBLEM — M17.11 PRIMARY OSTEOARTHRITIS OF RIGHT KNEE: Chronic | Status: ACTIVE | Noted: 2023-09-01

## 2023-10-19 RX ORDER — MELATONIN
1000 DAILY
Qty: 90 TABLET | Refills: 1 | Status: SHIPPED | OUTPATIENT
Start: 2023-10-19

## 2023-10-19 RX ORDER — OMEPRAZOLE 20 MG/1
20 CAPSULE, DELAYED RELEASE ORAL DAILY
Qty: 90 CAPSULE | Refills: 1 | Status: SHIPPED | OUTPATIENT
Start: 2023-10-19

## 2023-10-19 RX ORDER — METFORMIN HYDROCHLORIDE 500 MG/1
500 TABLET, EXTENDED RELEASE ORAL
Qty: 90 TABLET | Refills: 1 | Status: SHIPPED | OUTPATIENT
Start: 2023-10-19

## 2023-10-19 RX ORDER — CETIRIZINE HYDROCHLORIDE 10 MG/1
10 TABLET ORAL DAILY
Qty: 90 TABLET | Refills: 1 | Status: SHIPPED | OUTPATIENT
Start: 2023-10-19

## 2023-10-19 RX ORDER — MELOXICAM 7.5 MG/1
7.5 TABLET ORAL DAILY
Qty: 30 TABLET | Refills: 0 | Status: SHIPPED | OUTPATIENT
Start: 2023-10-19

## 2023-10-19 RX ORDER — ONDANSETRON 4 MG/1
4 TABLET, FILM COATED ORAL EVERY 4 HOURS PRN
Qty: 30 TABLET | Refills: 1 | Status: SHIPPED | OUTPATIENT
Start: 2023-10-19

## 2023-10-19 RX ORDER — ASPIRIN 81 MG/1
TABLET, COATED ORAL
COMMUNITY
Start: 2023-09-18

## 2023-10-19 RX ORDER — LANOLIN ALCOHOL/MO/W.PET/CERES
400 CREAM (GRAM) TOPICAL DAILY
Qty: 90 TABLET | Refills: 1 | Status: SHIPPED | OUTPATIENT
Start: 2023-10-19

## 2023-10-19 RX ORDER — LISINOPRIL 10 MG/1
10 TABLET ORAL DAILY
Qty: 90 TABLET | Refills: 1 | Status: SHIPPED | OUTPATIENT
Start: 2023-10-19

## 2023-10-19 NOTE — PROGRESS NOTES
"Chief Complaint  Huma Montano is a 63 y.o. female presenting for Annual Exam and Med Refill.     From [place]. Lived in [place] for many years. , lives by herself - daughter lives with her PRN. She has 3 adult children. 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     Patient has a past medical history of hypertension, hyperlipidemia, rA (f/u rheum), Graves' disease s/p radioactive iodine ablation on Synthroid, prediabetes, MELISSA on CPAP, allergic rhinitis, ADHD (was on Adderall, but psych didn't continue due to other controlled meds).  She has chronic musculoskeletal pain related to multiple injuries and scoliosis, spinal stenosis, s/p spinal surgery, followed by pain management.    History of Present Illness  Patient is here for annual physical and follow-up/refills.    She was noted with borderline high dose of Synthroid, and we reduced the dose in August.    She also was noted with decreased kidney function, which has previously been normal.    Patient underwent right TKA about 1 month ago, and has recovered significantly.  She is able to walk and move much better, this has also helped for her back pain.  She is helping that day we will do the left knee before Christmas.    The following portions of the patient's history were reviewed and updated as appropriate: allergies, current medications, past family history, past medical history, past social history, past surgical history, and problem list.    Objective  /80 (BP Location: Left arm, Patient Position: Sitting, Cuff Size: Adult)   Pulse 72   Temp 97.3 °F (36.3 °C) (Temporal)   Ht 164.5 cm (64.75\")   Wt 66.7 kg (147 lb)   BMI 24.65 kg/m²     Physical Exam  Vitals reviewed.   Constitutional:       Appearance: Normal appearance.   HENT:      Head: Normocephalic and atraumatic.      Right Ear: Tympanic membrane, ear canal and external ear normal. There is no impacted " cerumen.      Left Ear: Tympanic membrane, ear canal and external ear normal. There is no impacted cerumen.      Nose: Nose normal. No congestion.      Mouth/Throat:      Mouth: Mucous membranes are moist.      Pharynx: Oropharynx is clear.   Eyes:      Extraocular Movements: Extraocular movements intact.      Conjunctiva/sclera: Conjunctivae normal.   Cardiovascular:      Rate and Rhythm: Normal rate and regular rhythm.      Heart sounds: Normal heart sounds. No murmur heard.  Pulmonary:      Effort: Pulmonary effort is normal.      Breath sounds: Normal breath sounds.   Abdominal:      General: There is no distension.      Palpations: Abdomen is soft. There is no mass.      Tenderness: There is no abdominal tenderness.   Musculoskeletal:         General: Swelling present.      Cervical back: Neck supple. No tenderness.      Right lower leg: No edema.      Left lower leg: No edema.      Comments: Right knee: Minimal swelling, no redness.  Well-healing incisions.   Lymphadenopathy:      Cervical: No cervical adenopathy.   Skin:     General: Skin is warm and dry.   Neurological:      Mental Status: She is alert and oriented to person, place, and time. Mental status is at baseline.   Psychiatric:         Behavior: Behavior normal.         Thought Content: Thought content normal.         Assessment/Plan   1. Well adult exam  Counseled on recommendations for annual flu shot, COVID booster, and also recommend the new RSV vaccine for everyone over age 60.  Counseled on recommendations for regular dental visits, at least once yearly, ideally twice yearly.  Counseled on recommendations for continued breast cancer screening mammograms, ideally yearly.  She had her last in September.  Counseled on exercise.  BMI is within normal parameters. No other follow-up for BMI required.      2. Elevated serum creatinine  3. DDD (degenerative disc disease), lumbar  Suspect decreased kidney function as side effect of Mobic.  We will  reduce dose from 50 mg to 7.5 mg daily.  We will repeat BMP in 3-4 weeks, if still decreased I probably would recommend to stop Mobic entirely.  Encouraged hydration.  Patient reports back pain improved after she did her knee surgery.  She is taking omeprazole for GI protection on Mobic.  She reports no history of GERD  - Basic Metabolic Panel; Future  - meloxicam (MOBIC) 7.5 MG tablet; Take 1 tablet by mouth Daily. with food.  Dispense: 30 tablet; Refill: 0  - ondansetron (ZOFRAN) 4 MG tablet; Take 1 tablet by mouth Every 4 (Four) Hours As Needed for Nausea or Vomiting (CAN TAKE EVERY 4-6 HRS PRN).  Dispense: 30 tablet; Refill: 1  - omeprazole (priLOSEC) 20 MG capsule; Take 1 capsule by mouth Daily.  Dispense: 90 capsule; Refill: 1    4. Postablative hypothyroidism  We will do repeat thyroid panel with her blood work in 3-4 weeks.  Adjust if needed  - T4, Free; Future  - TSH; Future    5. Essential hypertension  BP Readings from Last 3 Encounters:   10/19/23 118/80   08/25/23 116/76   07/18/23 110/80   Blood pressure overall well controlled.  Continue on current medications.  - lisinopril (PRINIVIL,ZESTRIL) 10 MG tablet; Take 1 tablet by mouth Daily.  Dispense: 90 tablet; Refill: 1    6. Prediabetes  Hemoglobin A1C   Date Value Ref Range Status   08/25/2023 6.00 (H) 4.80 - 5.60 % Final   07/18/2023 5.9 % Final   04/18/2023 6.4 % Final   10/05/2022 6.00 (H) 4.80 - 5.60 % Final   11/24/2021 6.35 (H) 4.80 - 5.60 % Final   Good glycemic control.  We will do repeat in about 6 months.  - metFORMIN ER (GLUCOPHAGE-XR) 500 MG 24 hr tablet; Take 1 tablet by mouth Daily With Breakfast.  Dispense: 90 tablet; Refill: 1    7. Allergic rhinitis, unspecified seasonality, unspecified trigger  Patient reports clogging of her ears if she does not use allergy medications  - cetirizine (zyrTEC) 10 MG tablet; Take 1 tablet by mouth Daily.  Dispense: 90 tablet; Refill: 1    8. Vitamin D deficiency  - cholecalciferol (VITAMIN D3) 25 MCG  (1000 UT) tablet; Take 1 tablet by mouth Daily.  Dispense: 90 tablet; Refill: 1      Return in about 6 months (around 4/19/2024) for Recheck.    Future Appointments         Provider Department [place]    4/22/2024 9:00 AM Morgan Wong MD [facility] [place]    10/21/2024 8:00 AM Morgan Wong MD [facility] [place]              Morgan Wong MD  Family Medicine  10/19/2023

## 2023-10-20 DIAGNOSIS — M51.36 DDD (DEGENERATIVE DISC DISEASE), LUMBAR: Chronic | ICD-10-CM

## 2023-10-20 RX ORDER — OMEPRAZOLE 20 MG/1
20 CAPSULE, DELAYED RELEASE ORAL DAILY
Qty: 90 CAPSULE | Refills: 1 | OUTPATIENT
Start: 2023-10-20

## 2023-12-05 DIAGNOSIS — M51.36 DDD (DEGENERATIVE DISC DISEASE), LUMBAR: Chronic | ICD-10-CM

## 2023-12-05 RX ORDER — MELOXICAM 7.5 MG/1
7.5 TABLET ORAL DAILY
Qty: 30 TABLET | Refills: 5 | Status: SHIPPED | OUTPATIENT
Start: 2023-12-05

## 2023-12-05 NOTE — TELEPHONE ENCOUNTER
Caller: DustyHuma    Relationship: Self    Best call back number: [phone]     Requested Prescriptions:   Requested Prescriptions     Pending Prescriptions Disp Refills    meloxicam (MOBIC) 7.5 MG tablet 30 tablet 0     Sig: Take 1 tablet by mouth Daily. with food.        Pharmacy where request should be sent: [facility] [address] - [phone]  - [phone] FX     Last office visit with prescribing clinician: 10/19/2023   Last telemedicine visit with prescribing clinician: Visit date not found   Next office visit with prescribing clinician: 4/22/2024     Additional details provided by patient: OUT OF MEDICATION. PATIENT STATES SHE HAS BEEN DOING WELL WITH THE DOSAGE DECREASE AND WOULD LIKE TO REQUEST A 5 MONTH SUPPLY OF REFILLS BE SENT TO THE PHARMACY.     Does the patient have less than a 3 day supply:  [x] Yes  [] No    Would you like a call back once the refill request has been completed: [] Yes [x] No    If the office needs to give you a call back, can they leave a voicemail: [] Yes [x] No    Loraine Burris Rep   12/05/23 12:56 EST

## 2023-12-19 ENCOUNTER — LAB (OUTPATIENT)
Dept: LAB | Facility: HOSPITAL | Age: 63
End: 2023-12-19
Payer: COMMERCIAL

## 2023-12-19 DIAGNOSIS — R79.89 ELEVATED SERUM CREATININE: ICD-10-CM

## 2023-12-19 DIAGNOSIS — E89.0 POSTABLATIVE HYPOTHYROIDISM: Chronic | ICD-10-CM

## 2023-12-19 PROCEDURE — 84439 ASSAY OF FREE THYROXINE: CPT

## 2023-12-19 PROCEDURE — 80048 BASIC METABOLIC PNL TOTAL CA: CPT

## 2023-12-19 PROCEDURE — 84443 ASSAY THYROID STIM HORMONE: CPT

## 2023-12-20 DIAGNOSIS — E89.0 POSTABLATIVE HYPOTHYROIDISM: Chronic | ICD-10-CM

## 2023-12-20 LAB
ANION GAP SERPL CALCULATED.3IONS-SCNC: 12 MMOL/L (ref 5–15)
BUN SERPL-MCNC: 25 MG/DL (ref 8–23)
BUN/CREAT SERPL: 25.8 (ref 7–25)
CALCIUM SPEC-SCNC: 10.6 MG/DL (ref 8.6–10.5)
CHLORIDE SERPL-SCNC: 102 MMOL/L (ref 98–107)
CO2 SERPL-SCNC: 26 MMOL/L (ref 22–29)
CREAT SERPL-MCNC: 0.97 MG/DL (ref 0.57–1)
EGFRCR SERPLBLD CKD-EPI 2021: 65.8 ML/MIN/1.73
GLUCOSE SERPL-MCNC: 112 MG/DL (ref 65–99)
POTASSIUM SERPL-SCNC: 4.2 MMOL/L (ref 3.5–5.2)
SODIUM SERPL-SCNC: 140 MMOL/L (ref 136–145)
T4 FREE SERPL-MCNC: 1.2 NG/DL (ref 0.93–1.7)
TSH SERPL DL<=0.05 MIU/L-ACNC: 5.77 UIU/ML (ref 0.27–4.2)

## 2023-12-20 RX ORDER — LEVOTHYROXINE SODIUM 112 UG/1
112 TABLET ORAL DAILY
Qty: 90 TABLET | Refills: 1 | Status: SHIPPED | OUTPATIENT
Start: 2023-12-20

## 2024-03-22 ENCOUNTER — LAB (OUTPATIENT)
Dept: LAB | Facility: HOSPITAL | Age: 64
End: 2024-03-22
Payer: COMMERCIAL

## 2024-03-22 DIAGNOSIS — E89.0 POSTABLATIVE HYPOTHYROIDISM: Chronic | ICD-10-CM

## 2024-03-22 PROCEDURE — 84439 ASSAY OF FREE THYROXINE: CPT

## 2024-03-22 PROCEDURE — 84443 ASSAY THYROID STIM HORMONE: CPT

## 2024-03-23 LAB
T4 FREE SERPL-MCNC: 1.56 NG/DL (ref 0.93–1.7)
TSH SERPL DL<=0.05 MIU/L-ACNC: 3.22 UIU/ML (ref 0.27–4.2)

## 2024-03-25 ENCOUNTER — TRANSCRIBE ORDERS (OUTPATIENT)
Dept: LAB | Facility: HOSPITAL | Age: 64
End: 2024-03-25
Payer: COMMERCIAL

## 2024-03-25 ENCOUNTER — TRANSCRIBE ORDERS (OUTPATIENT)
Dept: ADMINISTRATIVE | Facility: HOSPITAL | Age: 64
End: 2024-03-25
Payer: COMMERCIAL

## 2024-03-25 ENCOUNTER — LAB (OUTPATIENT)
Dept: LAB | Facility: HOSPITAL | Age: 64
End: 2024-03-25
Payer: COMMERCIAL

## 2024-03-25 DIAGNOSIS — M81.0 SENILE OSTEOPOROSIS: Primary | ICD-10-CM

## 2024-03-25 DIAGNOSIS — E58 CALCIUM DEFICIENCY: ICD-10-CM

## 2024-03-25 DIAGNOSIS — M81.0 SENILE OSTEOPOROSIS: ICD-10-CM

## 2024-03-25 DIAGNOSIS — Z13.9 SCREENING DUE: Primary | ICD-10-CM

## 2024-03-25 LAB
ALBUMIN SERPL-MCNC: 4.1 G/DL (ref 3.5–5.2)
ALP SERPL-CCNC: 96 U/L (ref 39–117)
ALT SERPL W P-5'-P-CCNC: 17 U/L (ref 1–33)
AST SERPL-CCNC: 19 U/L (ref 1–32)
BILIRUB CONJ SERPL-MCNC: <0.2 MG/DL (ref 0–0.3)
BILIRUB INDIRECT SERPL-MCNC: NORMAL MG/DL
BILIRUB SERPL-MCNC: 0.7 MG/DL (ref 0–1.2)
CALCIUM SPEC-SCNC: 10.1 MG/DL (ref 8.6–10.5)
CREAT SERPL-MCNC: 1.16 MG/DL (ref 0.57–1)
DEPRECATED RDW RBC AUTO: 45 FL (ref 37–54)
EGFRCR SERPLBLD CKD-EPI 2021: 53.1 ML/MIN/1.73
ERYTHROCYTE [DISTWIDTH] IN BLOOD BY AUTOMATED COUNT: 12.4 % (ref 12.3–15.4)
HCT VFR BLD AUTO: 43.2 % (ref 34–46.6)
HGB BLD-MCNC: 14.6 G/DL (ref 12–15.9)
MCH RBC QN AUTO: 33.6 PG (ref 26.6–33)
MCHC RBC AUTO-ENTMCNC: 33.8 G/DL (ref 31.5–35.7)
MCV RBC AUTO: 99.3 FL (ref 79–97)
PHOSPHATE SERPL-MCNC: 3.6 MG/DL (ref 2.5–4.5)
PLATELET # BLD AUTO: 275 10*3/MM3 (ref 140–450)
PMV BLD AUTO: 11.5 FL (ref 6–12)
PROT SERPL-MCNC: 6.6 G/DL (ref 6–8.5)
RBC # BLD AUTO: 4.35 10*6/MM3 (ref 3.77–5.28)
WBC NRBC COR # BLD AUTO: 11.05 10*3/MM3 (ref 3.4–10.8)

## 2024-03-25 PROCEDURE — 82310 ASSAY OF CALCIUM: CPT

## 2024-03-25 PROCEDURE — 36415 COLL VENOUS BLD VENIPUNCTURE: CPT

## 2024-03-25 PROCEDURE — 83970 ASSAY OF PARATHORMONE: CPT

## 2024-03-25 PROCEDURE — 85027 COMPLETE CBC AUTOMATED: CPT

## 2024-03-25 PROCEDURE — 84100 ASSAY OF PHOSPHORUS: CPT

## 2024-03-25 PROCEDURE — 82306 VITAMIN D 25 HYDROXY: CPT

## 2024-03-25 PROCEDURE — 82565 ASSAY OF CREATININE: CPT

## 2024-03-25 PROCEDURE — 80076 HEPATIC FUNCTION PANEL: CPT

## 2024-03-26 LAB
25(OH)D3 SERPL-MCNC: 32.6 NG/ML (ref 30–100)
PTH-INTACT SERPL-MCNC: 28.6 PG/ML (ref 15–65)

## 2024-03-28 ENCOUNTER — TRANSCRIBE ORDERS (OUTPATIENT)
Dept: ADMINISTRATIVE | Facility: HOSPITAL | Age: 64
End: 2024-03-28
Payer: COMMERCIAL

## 2024-03-28 DIAGNOSIS — M81.0 AGE-RELATED OSTEOPOROSIS WITHOUT CURRENT PATHOLOGICAL FRACTURE: Primary | ICD-10-CM

## 2024-04-01 ENCOUNTER — HOSPITAL ENCOUNTER (OUTPATIENT)
Dept: BONE DENSITY | Facility: HOSPITAL | Age: 64
Discharge: HOME | End: 2024-04-01
Admitting: ORTHOPAEDIC SURGERY
Payer: COMMERCIAL

## 2024-04-01 DIAGNOSIS — M81.0 SENILE OSTEOPOROSIS: ICD-10-CM

## 2024-04-01 DIAGNOSIS — M81.0 AGE-RELATED OSTEOPOROSIS WITHOUT CURRENT PATHOLOGICAL FRACTURE: ICD-10-CM

## 2024-04-01 PROCEDURE — 77080 DXA BONE DENSITY AXIAL: CPT

## 2024-04-15 DIAGNOSIS — E78.2 MIXED HYPERLIPIDEMIA: Chronic | ICD-10-CM

## 2024-04-15 RX ORDER — ATORVASTATIN CALCIUM 80 MG/1
80 TABLET, FILM COATED ORAL NIGHTLY
Qty: 90 TABLET | Refills: 1 | Status: SHIPPED | OUTPATIENT
Start: 2024-04-15 | End: 2024-04-22 | Stop reason: SDUPTHER

## 2024-04-22 ENCOUNTER — OFFICE VISIT (OUTPATIENT)
Dept: INTERNAL MEDICINE | Facility: CLINIC | Age: 64
End: 2024-04-22
Payer: COMMERCIAL

## 2024-04-22 VITALS
HEART RATE: 60 BPM | BODY MASS INDEX: 21.66 KG/M2 | DIASTOLIC BLOOD PRESSURE: 74 MMHG | HEIGHT: 65 IN | TEMPERATURE: 97.5 F | SYSTOLIC BLOOD PRESSURE: 124 MMHG | WEIGHT: 130 LBS

## 2024-04-22 DIAGNOSIS — J30.9 ALLERGIC RHINITIS, UNSPECIFIED SEASONALITY, UNSPECIFIED TRIGGER: Chronic | ICD-10-CM

## 2024-04-22 DIAGNOSIS — R63.4 WEIGHT LOSS: ICD-10-CM

## 2024-04-22 DIAGNOSIS — N18.31 STAGE 3A CHRONIC KIDNEY DISEASE: Chronic | ICD-10-CM

## 2024-04-22 DIAGNOSIS — R73.03 PREDIABETES: Chronic | ICD-10-CM

## 2024-04-22 DIAGNOSIS — I10 ESSENTIAL HYPERTENSION: Chronic | ICD-10-CM

## 2024-04-22 DIAGNOSIS — M51.36 DDD (DEGENERATIVE DISC DISEASE), LUMBAR: Chronic | ICD-10-CM

## 2024-04-22 DIAGNOSIS — R29.890 LOSS OF HEIGHT: ICD-10-CM

## 2024-04-22 DIAGNOSIS — E78.2 MIXED HYPERLIPIDEMIA: Chronic | ICD-10-CM

## 2024-04-22 DIAGNOSIS — R10.9 ABDOMINAL DISCOMFORT: Primary | ICD-10-CM

## 2024-04-22 DIAGNOSIS — E89.0 POSTABLATIVE HYPOTHYROIDISM: Chronic | ICD-10-CM

## 2024-04-22 DIAGNOSIS — E55.9 VITAMIN D DEFICIENCY: Chronic | ICD-10-CM

## 2024-04-22 PROBLEM — E66.3 OVERWEIGHT (BMI 25.0-29.9): Status: RESOLVED | Noted: 2022-08-10 | Resolved: 2024-04-22

## 2024-04-22 PROCEDURE — 99215 OFFICE O/P EST HI 40 MIN: CPT | Performed by: STUDENT IN AN ORGANIZED HEALTH CARE EDUCATION/TRAINING PROGRAM

## 2024-04-22 RX ORDER — METFORMIN HYDROCHLORIDE 500 MG/1
500 TABLET, EXTENDED RELEASE ORAL
Qty: 90 TABLET | Refills: 3 | Status: SHIPPED | OUTPATIENT
Start: 2024-04-22

## 2024-04-22 RX ORDER — OMEPRAZOLE 20 MG/1
20 CAPSULE, DELAYED RELEASE ORAL DAILY
Qty: 90 CAPSULE | Refills: 3 | Status: SHIPPED | OUTPATIENT
Start: 2024-04-22

## 2024-04-22 RX ORDER — LANOLIN ALCOHOL/MO/W.PET/CERES
400 CREAM (GRAM) TOPICAL DAILY
Qty: 90 TABLET | Refills: 3 | Status: SHIPPED | OUTPATIENT
Start: 2024-04-22

## 2024-04-22 RX ORDER — LEVOTHYROXINE SODIUM 112 UG/1
112 TABLET ORAL DAILY
Qty: 90 TABLET | Refills: 1 | Status: SHIPPED | OUTPATIENT
Start: 2024-04-22

## 2024-04-22 RX ORDER — FLUTICASONE PROPIONATE 50 MCG
2 SPRAY, SUSPENSION (ML) NASAL DAILY
Qty: 54.6 ML | Refills: 6 | Status: SHIPPED | OUTPATIENT
Start: 2024-04-22

## 2024-04-22 RX ORDER — MELATONIN
1000 DAILY
Qty: 90 TABLET | Refills: 3 | Status: SHIPPED | OUTPATIENT
Start: 2024-04-22

## 2024-04-22 RX ORDER — LISINOPRIL 10 MG/1
10 TABLET ORAL DAILY
Qty: 90 TABLET | Refills: 3 | Status: SHIPPED | OUTPATIENT
Start: 2024-04-22

## 2024-04-22 RX ORDER — CETIRIZINE HYDROCHLORIDE 10 MG/1
10 TABLET ORAL DAILY
Qty: 90 TABLET | Refills: 3 | Status: SHIPPED | OUTPATIENT
Start: 2024-04-22

## 2024-04-22 RX ORDER — ATORVASTATIN CALCIUM 80 MG/1
80 TABLET, FILM COATED ORAL NIGHTLY
Qty: 90 TABLET | Refills: 3 | Status: SHIPPED | OUTPATIENT
Start: 2024-04-22

## 2024-04-22 NOTE — PROGRESS NOTES
"Chief Complaint  Huma Montano is a 63 y.o. female presenting for Med Refill and Gi issues.     From [place]. Lived in [place] for many years. , lives by herself - daughter lives with her PRN. She has 3 adult children. 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     Patient has a past medical history of hypertension, hyperlipidemia, RA (f/u rheum), Graves' disease s/p radioactive iodine ablation on Synthroid, prediabetes, MELISSA on CPAP, allergic rhinitis, ADHD (was on Adderall, but psych didn't continue due to other controlled meds).  She has chronic musculoskeletal pain related to multiple injuries and scoliosis, spinal stenosis, s/p spinal surgery, followed by pain management.    History of Present Illness  Patient is here for follow-up.    She is experiencing some digestive issues, this has been going on for months.  She lost about 7 pounds since October 2023, but over the last 2 months her weight has been stable around 130-135 pounds.  No epigastric pain.  She does have some lower abdominal discomfort.  Her last colonoscopy was in 2022.  Her appetite is good.  She feels some of this might have been contributed by her metformin.  She is overall tolerating the metformin well.    She also has lost a good amount of hide.  Her adult height used to be 172 cm / 5 foot 7 and 3/4, she is now down to 5 food 4 and 3/4 or 164.5 cm.  So a total height loss of 8.5 cm or about 3.5 inches.  She did DEXA scan that showed normal bone mass.    The following portions of the patient's history were reviewed and updated as appropriate: allergies, current medications, past family history, past medical history, past social history, past surgical history, and problem list.    Objective  /74 (BP Location: Left arm, Patient Position: Sitting, Cuff Size: Adult)   Pulse 60   Temp 97.5 °F (36.4 °C) (Temporal)   Ht 164.5 cm (64.76\")   Wt 59 kg (130 lb) "   BMI 21.79 kg/m²     Physical Exam  Vitals reviewed.   Constitutional:       Appearance: Normal appearance.   HENT:      Head: Normocephalic and atraumatic.      Nose: No congestion.   Eyes:      Extraocular Movements: Extraocular movements intact.      Conjunctiva/sclera: Conjunctivae normal.   Cardiovascular:      Rate and Rhythm: Normal rate and regular rhythm.      Heart sounds: Normal heart sounds. No murmur heard.  Pulmonary:      Effort: Pulmonary effort is normal.      Breath sounds: Normal breath sounds.   Abdominal:      General: There is no distension.      Palpations: Abdomen is soft. There is no mass.      Tenderness: There is no abdominal tenderness.   Musculoskeletal:      Cervical back: Neck supple. No tenderness.      Right lower leg: No edema.      Left lower leg: No edema.   Lymphadenopathy:      Cervical: No cervical adenopathy.   Skin:     General: Skin is warm and dry.   Neurological:      Mental Status: She is alert and oriented to person, place, and time. Mental status is at baseline.   Psychiatric:         Behavior: Behavior normal.         Thought Content: Thought content normal.         Assessment/Plan   1. Abdominal discomfort  2. Weight loss  Reassuringly her appetite is good.  She has lost a total of 17 pounds since October.  Will do some repeat blood work, but so far blood work is fairly reassuring.  She had colonoscopy in 2022.  With continued abdominal discomfort we might need to do further workup.  Will do blood work in about 4-5 weeks and follow-up here in 6 weeks.    3. Loss of height  Suspect related to degeneration of disks.  Will continue to monitor for now.    4. Stage 3a chronic kidney disease  May be partially related to use of Mobic, we did reduce the dose from 50 mg to 7.5 mg.  At this point we need to discontinue entirely, she should also not use over-the-counter anti-inflammatory medications like ibuprofen, Advil, Motrin, Aleve, naproxen and similar, also known as  NSAIDs.  She can safely take Tylenol/acetaminophen.  Will repeat blood work again in about 4-5 weeks.  - Protein Elec + Interp, Serum; Future  - Basic Metabolic Panel; Future  - Urinalysis With Microscopic If Indicated (No Culture) - Urine, Clean Catch; Future  - MicroAlbumin, Urine, Random - Urine, Clean Catch; Future    5. Essential hypertension  BP Readings from Last 3 Encounters:   04/22/24 124/74   10/19/23 118/80   08/25/23 116/76   Stable and well-controlled.  - lisinopril (PRINIVIL,ZESTRIL) 10 MG tablet; Take 1 tablet by mouth Daily.  Dispense: 90 tablet; Refill: 3    6. Prediabetes  Recent A1c from outside labs stable at 6.0.  - Hemoglobin A1c; Future  - metFORMIN ER (GLUCOPHAGE-XR) 500 MG 24 hr tablet; Take 1 tablet by mouth Daily With Breakfast.  Dispense: 90 tablet; Refill: 3    7. Postablative hypothyroidism  Currently at goal.  Continue on current dose of Synthroid  - levothyroxine (SYNTHROID, LEVOTHROID) 112 MCG tablet; Take 1 tablet by mouth Daily. 6d/w, 1/2 tablet 1 d/w  Dispense: 90 tablet; Refill: 1    8. Mixed hyperlipidemia  - atorvastatin (LIPITOR) 80 MG tablet; Take 1 tablet by mouth Every Night. At bedtime  Dispense: 90 tablet; Refill: 3    9. Allergic rhinitis, unspecified seasonality, unspecified trigger  - cetirizine (zyrTEC) 10 MG tablet; Take 1 tablet by mouth Daily.  Dispense: 90 tablet; Refill: 3  - fluticasone (FLONASE) 50 MCG/ACT nasal spray; 2 sprays into the nostril(s) as directed by provider Daily. 1-2 sprays daily  Dispense: 54.6 mL; Refill: 6    10. Vitamin D deficiency  - cholecalciferol (VITAMIN D3) 25 MCG (1000 UT) tablet; Take 1 tablet by mouth Daily.  Dispense: 90 tablet; Refill: 3    11. DDD (degenerative disc disease), lumbar  As used for GI protection on NSAIDs.  Consider discontinuing.  - omeprazole (priLOSEC) 20 MG capsule; Take 1 capsule by mouth Daily.  Dispense: 90 capsule; Refill: 3      Patient has been erroneously marked as diabetic. Based on the available  clinical information, she does not have diabetes and should therefore be excluded from diabetic health maintenance and quality measures for the remainder of the reporting period.    BMI is within normal parameters. No other follow-up for BMI required.      Total time spent on chart review, charting and face-to-face with patient 40 minutes    Return in about 6 weeks (around 6/3/2024) for Recheck.    Future Appointments         Provider Department Center    6/3/2024 11:00 AM Morgan Wong MD [facility] INTERNAL MEDICINE [place]    10/21/2024 8:00 AM Morgan Wong MD [facility] INTER[facility] MEDICINE [place]              Morgan Wong MD  Family Medicine  04/22/2024

## 2024-05-24 ENCOUNTER — LAB (OUTPATIENT)
Dept: LAB | Facility: HOSPITAL | Age: 64
End: 2024-05-24
Payer: COMMERCIAL

## 2024-05-24 DIAGNOSIS — R73.03 PREDIABETES: Chronic | ICD-10-CM

## 2024-05-24 DIAGNOSIS — N18.31 STAGE 3A CHRONIC KIDNEY DISEASE: Chronic | ICD-10-CM

## 2024-05-24 LAB — HBA1C MFR BLD: 6 % (ref 4.8–5.6)

## 2024-05-24 PROCEDURE — 36415 COLL VENOUS BLD VENIPUNCTURE: CPT

## 2024-05-24 PROCEDURE — 84165 PROTEIN E-PHORESIS SERUM: CPT

## 2024-05-24 PROCEDURE — 84155 ASSAY OF PROTEIN SERUM: CPT

## 2024-05-24 PROCEDURE — 80048 BASIC METABOLIC PNL TOTAL CA: CPT

## 2024-05-24 PROCEDURE — 83036 HEMOGLOBIN GLYCOSYLATED A1C: CPT

## 2024-05-25 LAB
ANION GAP SERPL CALCULATED.3IONS-SCNC: 11.2 MMOL/L (ref 5–15)
BUN SERPL-MCNC: 13 MG/DL (ref 8–23)
BUN/CREAT SERPL: 14.4 (ref 7–25)
CALCIUM SPEC-SCNC: 10.5 MG/DL (ref 8.6–10.5)
CHLORIDE SERPL-SCNC: 100 MMOL/L (ref 98–107)
CO2 SERPL-SCNC: 25.8 MMOL/L (ref 22–29)
CREAT SERPL-MCNC: 0.9 MG/DL (ref 0.57–1)
EGFRCR SERPLBLD CKD-EPI 2021: 72 ML/MIN/1.73
GLUCOSE SERPL-MCNC: 89 MG/DL (ref 65–99)
POTASSIUM SERPL-SCNC: 4 MMOL/L (ref 3.5–5.2)
SODIUM SERPL-SCNC: 137 MMOL/L (ref 136–145)

## 2024-05-28 ENCOUNTER — LAB (OUTPATIENT)
Dept: LAB | Facility: HOSPITAL | Age: 64
End: 2024-05-28
Payer: COMMERCIAL

## 2024-05-28 DIAGNOSIS — N18.31 STAGE 3A CHRONIC KIDNEY DISEASE: ICD-10-CM

## 2024-05-28 LAB
ALBUMIN SERPL ELPH-MCNC: 3.8 G/DL (ref 2.9–4.4)
ALBUMIN UR-MCNC: 1.5 MG/DL
ALBUMIN/GLOB SERPL: 1.3 {RATIO} (ref 0.7–1.7)
ALPHA1 GLOB SERPL ELPH-MCNC: 0.3 G/DL (ref 0–0.4)
ALPHA2 GLOB SERPL ELPH-MCNC: 0.7 G/DL (ref 0.4–1)
B-GLOBULIN SERPL ELPH-MCNC: 0.8 G/DL (ref 0.7–1.3)
BACTERIA UR QL AUTO: NORMAL /HPF
BILIRUB UR QL STRIP: NEGATIVE
CLARITY UR: CLEAR
COLOR UR: ABNORMAL
GAMMA GLOB SERPL ELPH-MCNC: 1.1 G/DL (ref 0.4–1.8)
GLOBULIN SER CALC-MCNC: 2.9 G/DL (ref 2.2–3.9)
GLUCOSE UR STRIP-MCNC: NEGATIVE MG/DL
HGB UR QL STRIP.AUTO: NEGATIVE
HYALINE CASTS UR QL AUTO: NORMAL /LPF
KETONES UR QL STRIP: ABNORMAL
LABORATORY COMMENT REPORT: NORMAL
LEUKOCYTE ESTERASE UR QL STRIP.AUTO: ABNORMAL
M PROTEIN SERPL ELPH-MCNC: NORMAL G/DL
NITRITE UR QL STRIP: NEGATIVE
PH UR STRIP.AUTO: 5.5 [PH] (ref 5–8)
PROT PATTERN SERPL ELPH-IMP: NORMAL
PROT SERPL-MCNC: 6.7 G/DL (ref 6–8.5)
PROT UR QL STRIP: ABNORMAL
RBC # UR STRIP: NORMAL /HPF
REF LAB TEST METHOD: NORMAL
SP GR UR STRIP: 1.03 (ref 1–1.03)
SQUAMOUS #/AREA URNS HPF: NORMAL /HPF
UROBILINOGEN UR QL STRIP: ABNORMAL
WBC # UR STRIP: NORMAL /HPF

## 2024-05-28 PROCEDURE — 81001 URINALYSIS AUTO W/SCOPE: CPT

## 2024-05-28 PROCEDURE — 82043 UR ALBUMIN QUANTITATIVE: CPT

## 2024-06-02 NOTE — TELEPHONE ENCOUNTER
Caller: Huma Montano [place]    Relationship: Self    Best call back number: [phone]     Requested Prescriptions:   Requested Prescriptions     Pending Prescriptions Disp Refills   • levothyroxine (SYNTHROID, LEVOTHROID) 112 MCG tablet 90 tablet 1     Sig: Take 1 tablet by mouth Daily.   • omeprazole (priLOSEC) 20 MG capsule 90 capsule 1     Sig: Take 1 capsule by mouth Daily.   • meloxicam (MOBIC) 15 MG tablet 90 tablet 1     Sig: Take 1 tablet by mouth Daily. with food.        Pharmacy where request should be sent: [facility] - [phone]  - [phone]      Additional details provided by patient: PATIENT ASKED FOR DR MARQUIS TO SEND PRESCRIPTIONS FOR THESE MEDICATION.  PATIENT ALSO ASKED FOR DR MARQUIS TO TAKE OVER HER PRESCRIPTION FOR AMITRIPTYLINE.     Does the patient have less than a 3 day supply:  [] Yes  [x] No    Loraine Field Rep   08/26/22 13:13 EDT     
Rx Refill Note  Requested Prescriptions     Pending Prescriptions Disp Refills   • levothyroxine (SYNTHROID, LEVOTHROID) 112 MCG tablet 90 tablet 1     Sig: Take 1 tablet by mouth Daily.   • omeprazole (priLOSEC) 20 MG capsule 90 capsule 1     Sig: Take 1 capsule by mouth Daily.   • meloxicam (MOBIC) 15 MG tablet 90 tablet 1     Sig: Take 1 tablet by mouth Daily. with food.      Last office visit with prescribing clinician: 8/10/2022      Next office visit with prescribing clinician: 10/18/2022            Julianne Sanchez LPN  08/26/22, 13:38 EDT  
Home

## 2024-06-03 ENCOUNTER — OFFICE VISIT (OUTPATIENT)
Dept: INTERNAL MEDICINE | Facility: CLINIC | Age: 64
End: 2024-06-03
Payer: COMMERCIAL

## 2024-06-03 ENCOUNTER — LAB (OUTPATIENT)
Dept: LAB | Facility: HOSPITAL | Age: 64
End: 2024-06-03
Payer: COMMERCIAL

## 2024-06-03 VITALS
HEART RATE: 88 BPM | SYSTOLIC BLOOD PRESSURE: 112 MMHG | DIASTOLIC BLOOD PRESSURE: 78 MMHG | WEIGHT: 123 LBS | BODY MASS INDEX: 20.49 KG/M2 | TEMPERATURE: 99.3 F | HEIGHT: 65 IN

## 2024-06-03 DIAGNOSIS — M54.16 LUMBAR BACK PAIN WITH RADICULOPATHY AFFECTING RIGHT LOWER EXTREMITY: Chronic | ICD-10-CM

## 2024-06-03 DIAGNOSIS — M06.9 RHEUMATOID ARTHRITIS INVOLVING MULTIPLE SITES, UNSPECIFIED WHETHER RHEUMATOID FACTOR PRESENT: Chronic | ICD-10-CM

## 2024-06-03 DIAGNOSIS — M41.25 IDIOPATHIC SCOLIOSIS OF THORACOLUMBAR REGION: Chronic | ICD-10-CM

## 2024-06-03 DIAGNOSIS — R63.4 WEIGHT LOSS: Primary | ICD-10-CM

## 2024-06-03 DIAGNOSIS — R10.13 EPIGASTRIC ABDOMINAL PAIN: ICD-10-CM

## 2024-06-03 DIAGNOSIS — M48.05 SPINAL STENOSIS, THORACOLUMBAR REGION: Chronic | ICD-10-CM

## 2024-06-03 LAB
ALBUMIN SERPL-MCNC: 4 G/DL (ref 3.5–5.2)
ALP SERPL-CCNC: 94 U/L (ref 39–117)
ALT SERPL W P-5'-P-CCNC: 14 U/L (ref 1–33)
AST SERPL-CCNC: 17 U/L (ref 1–32)
BASOPHILS # BLD AUTO: 0.08 10*3/MM3 (ref 0–0.2)
BASOPHILS NFR BLD AUTO: 0.9 % (ref 0–1.5)
BILIRUB CONJ SERPL-MCNC: <0.2 MG/DL (ref 0–0.3)
BILIRUB INDIRECT SERPL-MCNC: NORMAL MG/DL
BILIRUB SERPL-MCNC: 0.3 MG/DL (ref 0–1.2)
DEPRECATED RDW RBC AUTO: 41.2 FL (ref 37–54)
EOSINOPHIL # BLD AUTO: 0.45 10*3/MM3 (ref 0–0.4)
EOSINOPHIL NFR BLD AUTO: 5.2 % (ref 0.3–6.2)
ERYTHROCYTE [DISTWIDTH] IN BLOOD BY AUTOMATED COUNT: 11.4 % (ref 12.3–15.4)
HCT VFR BLD AUTO: 44.9 % (ref 34–46.6)
HGB BLD-MCNC: 15.2 G/DL (ref 12–15.9)
IMM GRANULOCYTES # BLD AUTO: 0.02 10*3/MM3 (ref 0–0.05)
IMM GRANULOCYTES NFR BLD AUTO: 0.2 % (ref 0–0.5)
LIPASE SERPL-CCNC: 14 U/L (ref 13–60)
LYMPHOCYTES # BLD AUTO: 1.43 10*3/MM3 (ref 0.7–3.1)
LYMPHOCYTES NFR BLD AUTO: 16.4 % (ref 19.6–45.3)
MCH RBC QN AUTO: 33.6 PG (ref 26.6–33)
MCHC RBC AUTO-ENTMCNC: 33.9 G/DL (ref 31.5–35.7)
MCV RBC AUTO: 99.1 FL (ref 79–97)
MONOCYTES # BLD AUTO: 1.02 10*3/MM3 (ref 0.1–0.9)
MONOCYTES NFR BLD AUTO: 11.7 % (ref 5–12)
NEUTROPHILS NFR BLD AUTO: 5.72 10*3/MM3 (ref 1.7–7)
NEUTROPHILS NFR BLD AUTO: 65.6 % (ref 42.7–76)
NRBC BLD AUTO-RTO: 0 /100 WBC (ref 0–0.2)
PLATELET # BLD AUTO: 324 10*3/MM3 (ref 140–450)
PMV BLD AUTO: 10.7 FL (ref 6–12)
PROT SERPL-MCNC: 6.6 G/DL (ref 6–8.5)
RBC # BLD AUTO: 4.53 10*6/MM3 (ref 3.77–5.28)
WBC NRBC COR # BLD AUTO: 8.72 10*3/MM3 (ref 3.4–10.8)

## 2024-06-03 PROCEDURE — 99215 OFFICE O/P EST HI 40 MIN: CPT | Performed by: STUDENT IN AN ORGANIZED HEALTH CARE EDUCATION/TRAINING PROGRAM

## 2024-06-03 PROCEDURE — 80076 HEPATIC FUNCTION PANEL: CPT

## 2024-06-03 PROCEDURE — 83690 ASSAY OF LIPASE: CPT

## 2024-06-03 PROCEDURE — 36415 COLL VENOUS BLD VENIPUNCTURE: CPT

## 2024-06-03 PROCEDURE — 85025 COMPLETE CBC W/AUTO DIFF WBC: CPT

## 2024-06-03 NOTE — PROGRESS NOTES
Chief Complaint  Huma Montano is a 63 y.o. female presenting for Abdominal discomfort.     From [place]. Lived in [place] for many years. , lives by herself - daughter lives with her PRN. She has 3 adult children. 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     Patient has a past medical history of hypertension, hyperlipidemia, RA (f/u rheum), Graves' disease s/p radioactive iodine ablation on Synthroid, prediabetes, EMLISSA on CPAP, allergic rhinitis, ADHD (was on Adderall, but psych didn't continue due to other controlled meds).  She has chronic musculoskeletal pain related to multiple injuries and scoliosis, spinal stenosis, s/p spinal surgery, followed by pain management.    History of Present Illness  Patient is here for follow-up.    She has been experiencing worsening epigastric discomfort for 3-4 months, previously it was intermittent, now it is constant.  Localized around midepigastric, perhaps mildly towards the left side.  She also has decreased appetite, but has gotten a medical marijuana card and is now using marijuana for her appetite.  She feels this has helped somewhat, but has still continued to lose weight.  Her weight in August 2023 was 152 pounds, provide in April was 130 pounds, now down to 123 pounds.    She continues to follow with pain management.  She has significant pain of her thoracic back, but also her cervical spine and lumbar spine to a lesser degree.  She also has had multiple surgeries of her left hand, wearing a brace currently.  She is requesting I do paperwork for her disability renewal.    The following portions of the patient's history were reviewed and updated as appropriate: allergies, current medications, past family history, past medical history, past social history, past surgical history, and problem list.    Objective  /78 (BP Location: Left arm, Patient Position: Sitting, Cuff Size:  "Adult)   Pulse 88   Temp 99.3 °F (37.4 °C) (Temporal)   Ht 164.5 cm (64.76\")   Wt 55.8 kg (123 lb)   BMI 20.62 kg/m²     Physical Exam  Vitals reviewed.   Constitutional:       Appearance: Normal appearance.   HENT:      Head: Normocephalic and atraumatic.      Nose: Nose normal. No congestion.      Mouth/Throat:      Mouth: Mucous membranes are moist.   Eyes:      Extraocular Movements: Extraocular movements intact.      Conjunctiva/sclera: Conjunctivae normal.   Neck:      Comments: Decreased ROM, with some crepitus on movement.  Cardiovascular:      Rate and Rhythm: Normal rate and regular rhythm.      Heart sounds: Normal heart sounds. No murmur heard.  Pulmonary:      Effort: Pulmonary effort is normal.      Breath sounds: Normal breath sounds.   Abdominal:      General: There is no distension.      Palpations: Abdomen is soft. There is no mass.      Tenderness: There is abdominal tenderness. There is no guarding.      Hernia: No hernia is present.   Musculoskeletal:      Cervical back: Neck supple. Rigidity present.      Right lower leg: No edema.      Left lower leg: No edema.   Lymphadenopathy:      Cervical: No cervical adenopathy.   Skin:     General: Skin is warm and dry.   Neurological:      Mental Status: She is alert and oriented to person, place, and time. Mental status is at baseline.      Motor: No weakness.      Gait: Gait normal.   Psychiatric:         Behavior: Behavior normal.         Thought Content: Thought content normal.         Assessment/Plan   1. Weight loss  2. Epigastric abdominal pain  Concerning weight loss.  Also having epigastric pain constantly.  Will refer for CT abdomen pelvis without contrast due to her CKD.  Will also refer to GI for EGD and blood work as ordered.  Follow-up in 6 weeks, sooner if needed  - CT Abdomen Pelvis Without Contrast; Future  - Ambulatory referral for Screening EGD  - Hepatic Function Panel; Future  - CBC & Differential; Future  - Lipase; " Future    3. Idiopathic scoliosis of thoracolumbar region  4. Spinal stenosis, thoracolumbar region  5. Rheumatoid arthritis involving multiple sites, unspecified whether rheumatoid factor present  6. Lumbar back pain with radiculopathy affecting right lower extremity  Reviewed outside records from rheumatology, orthopedics.  I will complete forms for continued disability.      Total time spent on chart review, charting and face-to-face with patient 44 minutes    Return in about 6 weeks (around 7/15/2024), or if symptoms worsen or fail to improve, for Recheck.    Future Appointments         Provider Department Center    7/17/2024 9:30 AM Morgan Wong MD [facility] INTERNAL MEDICINE [place]    10/21/2024 8:00 AM Morgan Wong MD [facility] INTER[facility] MEDICINE [place]              Morgan Wong MD  Family Medicine  06/03/2024

## 2024-06-25 ENCOUNTER — TRANSCRIBE ORDERS (OUTPATIENT)
Dept: ADMINISTRATIVE | Facility: HOSPITAL | Age: 64
End: 2024-06-25
Payer: COMMERCIAL

## 2024-06-25 ENCOUNTER — HOSPITAL ENCOUNTER (OUTPATIENT)
Dept: GENERAL RADIOLOGY | Facility: HOSPITAL | Age: 64
Discharge: HOME | End: 2024-06-25
Admitting: INTERNAL MEDICINE
Payer: COMMERCIAL

## 2024-06-25 DIAGNOSIS — M54.2 CERVICALGIA: Primary | ICD-10-CM

## 2024-06-25 PROCEDURE — 72040 X-RAY EXAM NECK SPINE 2-3 VW: CPT

## 2024-06-29 ENCOUNTER — HOSPITAL ENCOUNTER (OUTPATIENT)
Dept: CT IMAGING | Facility: HOSPITAL | Age: 64
Discharge: HOME | End: 2024-06-29
Admitting: STUDENT IN AN ORGANIZED HEALTH CARE EDUCATION/TRAINING PROGRAM
Payer: COMMERCIAL

## 2024-06-29 DIAGNOSIS — R10.13 EPIGASTRIC ABDOMINAL PAIN: ICD-10-CM

## 2024-06-29 PROCEDURE — 74176 CT ABD & PELVIS W/O CONTRAST: CPT

## 2024-07-22 ENCOUNTER — OUTSIDE FACILITY SERVICE (OUTPATIENT)
Dept: GASTROENTEROLOGY | Facility: CLINIC | Age: 64
End: 2024-07-22
Payer: MEDICARE

## 2024-07-22 PROCEDURE — 88305 TISSUE EXAM BY PATHOLOGIST: CPT | Performed by: INTERNAL MEDICINE

## 2024-07-22 PROCEDURE — 43239 EGD BIOPSY SINGLE/MULTIPLE: CPT | Performed by: INTERNAL MEDICINE

## 2024-07-23 ENCOUNTER — LAB REQUISITION (OUTPATIENT)
Dept: LAB | Facility: HOSPITAL | Age: 64
End: 2024-07-23
Payer: MEDICARE

## 2024-07-23 DIAGNOSIS — K31.89 OTHER DISEASES OF STOMACH AND DUODENUM: ICD-10-CM

## 2024-07-23 DIAGNOSIS — R10.13 EPIGASTRIC PAIN: ICD-10-CM

## 2024-07-23 DIAGNOSIS — R10.9 UNSPECIFIED ABDOMINAL PAIN: ICD-10-CM

## 2024-07-24 LAB — REF LAB TEST METHOD: NORMAL

## 2024-07-26 ENCOUNTER — OFFICE VISIT (OUTPATIENT)
Dept: INTERNAL MEDICINE | Facility: CLINIC | Age: 64
End: 2024-07-26
Payer: MEDICARE

## 2024-07-26 VITALS
SYSTOLIC BLOOD PRESSURE: 88 MMHG | BODY MASS INDEX: 20.79 KG/M2 | HEIGHT: 65 IN | WEIGHT: 124.8 LBS | DIASTOLIC BLOOD PRESSURE: 64 MMHG | HEART RATE: 64 BPM | TEMPERATURE: 98.7 F

## 2024-07-26 DIAGNOSIS — M48.05 SPINAL STENOSIS, THORACOLUMBAR REGION: Chronic | ICD-10-CM

## 2024-07-26 DIAGNOSIS — I10 ESSENTIAL HYPERTENSION: Chronic | ICD-10-CM

## 2024-07-26 DIAGNOSIS — R63.4 WEIGHT LOSS: Primary | ICD-10-CM

## 2024-07-26 DIAGNOSIS — M47.812 SPONDYLOSIS OF CERVICAL REGION WITHOUT MYELOPATHY OR RADICULOPATHY: Chronic | ICD-10-CM

## 2024-07-26 DIAGNOSIS — M41.25 IDIOPATHIC SCOLIOSIS OF THORACOLUMBAR REGION: Chronic | ICD-10-CM

## 2024-07-26 DIAGNOSIS — M06.9 RHEUMATOID ARTHRITIS INVOLVING MULTIPLE SITES, UNSPECIFIED WHETHER RHEUMATOID FACTOR PRESENT: Chronic | ICD-10-CM

## 2024-07-26 DIAGNOSIS — Z98.1 S/P LUMBAR FUSION: ICD-10-CM

## 2024-07-26 PROCEDURE — 1159F MED LIST DOCD IN RCRD: CPT | Performed by: STUDENT IN AN ORGANIZED HEALTH CARE EDUCATION/TRAINING PROGRAM

## 2024-07-26 PROCEDURE — 3078F DIAST BP <80 MM HG: CPT | Performed by: STUDENT IN AN ORGANIZED HEALTH CARE EDUCATION/TRAINING PROGRAM

## 2024-07-26 PROCEDURE — 3044F HG A1C LEVEL LT 7.0%: CPT | Performed by: STUDENT IN AN ORGANIZED HEALTH CARE EDUCATION/TRAINING PROGRAM

## 2024-07-26 PROCEDURE — 99214 OFFICE O/P EST MOD 30 MIN: CPT | Performed by: STUDENT IN AN ORGANIZED HEALTH CARE EDUCATION/TRAINING PROGRAM

## 2024-07-26 PROCEDURE — 3074F SYST BP LT 130 MM HG: CPT | Performed by: STUDENT IN AN ORGANIZED HEALTH CARE EDUCATION/TRAINING PROGRAM

## 2024-07-26 PROCEDURE — 1125F AMNT PAIN NOTED PAIN PRSNT: CPT | Performed by: STUDENT IN AN ORGANIZED HEALTH CARE EDUCATION/TRAINING PROGRAM

## 2024-07-26 PROCEDURE — 1160F RVW MEDS BY RX/DR IN RCRD: CPT | Performed by: STUDENT IN AN ORGANIZED HEALTH CARE EDUCATION/TRAINING PROGRAM

## 2024-07-26 NOTE — PROGRESS NOTES
Chief Complaint  Huma Montano is a 63 y.o. female presenting for Weight Loss and referrals.     From [place]. Lived in [place] for many years. , lives by herself - daughter lives with her PRN. She has 3 adult children. 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     Patient has a past medical history of hypertension, hyperlipidemia, RA (f/u rheum), Graves' disease s/p radioactive iodine ablation on Synthroid, prediabetes, MELISSA on CPAP, allergic rhinitis, ADHD (was on Adderall, but psych didn't continue due to other controlled meds).  She has chronic musculoskeletal pain related to multiple injuries and scoliosis, spinal stenosis, s/p spinal surgery (2017, fusion L2-S1), followed by pain management.    History of Present Illness  Patient is here for follow-up due to her weight loss.  We did CT abdomen pelvis that showed constipation, otherwise nothing concerning.  She also underwent EGD, which was reassuring.  She started on MiraLAX and reports starting to feel better.  She still has some upper epigastric pain at times, but much improved, no longer 24-7.  She also has changed her diet, low fiber, drinking fluids.  Able to maintain weight currently.  Now she feels he wants to continue to monitor symptoms given improvement and nothing concerning.    Patient continues to follow-up with rheumatology, they ordered x-ray of her neck demonstrating degenerative changes.  She feels her neck has gotten worse and used to follow-up with neurosurgery, she had lumbar fusion in 2017.  Her previous neurosurgeon has retired, was following with [facility].  She is requesting to see Dr. Devyn George at this time.  She reports no pain shooting out to one of the other arm, no weakness.    The following portions of the patient's history were reviewed and updated as appropriate: allergies, current medications, past family history, past medical history, past  "social history, past surgical history, and problem list.    Objective  BP (!) 88/64 (BP Location: Left arm, Patient Position: Sitting, Cuff Size: Adult)   Pulse 64   Temp 98.7 °F (37.1 °C) (Temporal)   Ht 164.5 cm (64.76\")   Wt 56.6 kg (124 lb 12.8 oz)   BMI 20.92 kg/m²     Physical Exam  Constitutional:       Appearance: Normal appearance.   HENT:      Head: Normocephalic and atraumatic.   Eyes:      Extraocular Movements: Extraocular movements intact.      Conjunctiva/sclera: Conjunctivae normal.   Pulmonary:      Effort: Pulmonary effort is normal. No respiratory distress.   Musculoskeletal:      Cervical back: Normal range of motion and neck supple.   Neurological:      Mental Status: She is alert and oriented to person, place, and time. Mental status is at baseline.   Psychiatric:         Behavior: Behavior normal.         Thought Content: Thought content normal.         Assessment/Plan   1. Weight loss  Now stabilized.  so far ruled out any serious underlying GI condition.  May have been caused by her constipation.  Weight stabilized, will continue to monitor and follow-up in 2-3 months with annual Medicare visit.    2. Rheumatoid arthritis involving multiple sites, unspecified whether rheumatoid factor present  3. Spondylosis of cervical region without myelopathy or radiculopathy  4. Idiopathic scoliosis of thoracolumbar region  5. Spinal stenosis, thoracolumbar region  6. S/P lumbar fusion  Worsening neck pain, no red flags.  She is scheduled for physical therapy for her neck.  He is requesting referral to neurosurgery at this time to  establish care.  - Ambulatory Referral to Neurosurgery    7. Essential hypertension  BP Readings from Last 3 Encounters:   07/26/24 (!) 88/64   06/03/24 112/78   04/22/24 124/74   Blood pressure borderline low today, asymptomatic.  Continue to monitor and follow-up in 2-3 months.      Return for Medicare Wellness - please change future apptm. New to Medicare " now.    Future Appointments         Provider Department Center    10/21/2024 8:00 AM Morgan Wong MD [facility] INTERNAL MEDICINE [place]    11/26/2024 2:00 PM (Arrive by 1:30 PM) Lefty Lawrence MD [facility] GASTROENTEROLOGY [place]              Morgan Wong MD  Family Medicine  07/26/2024

## 2024-09-06 ENCOUNTER — PATIENT MESSAGE (OUTPATIENT)
Dept: INTERNAL MEDICINE | Facility: CLINIC | Age: 64
End: 2024-09-06
Payer: MEDICARE

## 2024-09-06 ENCOUNTER — LAB (OUTPATIENT)
Dept: LAB | Facility: HOSPITAL | Age: 64
End: 2024-09-06
Payer: MEDICARE

## 2024-09-06 ENCOUNTER — TRANSCRIBE ORDERS (OUTPATIENT)
Dept: LAB | Facility: HOSPITAL | Age: 64
End: 2024-09-06
Payer: MEDICARE

## 2024-09-06 DIAGNOSIS — M15.9 PRIMARY OSTEOARTHRITIS INVOLVING MULTIPLE JOINTS: ICD-10-CM

## 2024-09-06 DIAGNOSIS — M05.79 SEROPOSITIVE RHEUMATOID ARTHRITIS OF MULTIPLE SITES: ICD-10-CM

## 2024-09-06 DIAGNOSIS — M19.90 SENILE ARTHRITIS: ICD-10-CM

## 2024-09-06 DIAGNOSIS — M54.2 CERVICALGIA: ICD-10-CM

## 2024-09-06 DIAGNOSIS — M47.812 SPONDYLOSIS OF CERVICAL REGION WITHOUT MYELOPATHY OR RADICULOPATHY: Chronic | ICD-10-CM

## 2024-09-06 DIAGNOSIS — Z51.81 ENCOUNTER FOR THERAPEUTIC DRUG MONITORING: ICD-10-CM

## 2024-09-06 DIAGNOSIS — M05.79 SEROPOSITIVE RHEUMATOID ARTHRITIS OF MULTIPLE SITES: Primary | ICD-10-CM

## 2024-09-06 DIAGNOSIS — M06.9 RHEUMATOID ARTHRITIS INVOLVING MULTIPLE SITES, UNSPECIFIED WHETHER RHEUMATOID FACTOR PRESENT: Chronic | ICD-10-CM

## 2024-09-06 DIAGNOSIS — M48.05 SPINAL STENOSIS, THORACOLUMBAR REGION: Chronic | ICD-10-CM

## 2024-09-06 DIAGNOSIS — M17.11 PRIMARY OSTEOARTHRITIS OF RIGHT KNEE: Primary | Chronic | ICD-10-CM

## 2024-09-06 LAB
BASOPHILS # BLD AUTO: 0.09 10*3/MM3 (ref 0–0.2)
BASOPHILS NFR BLD AUTO: 1.2 % (ref 0–1.5)
DEPRECATED RDW RBC AUTO: 48.6 FL (ref 37–54)
EOSINOPHIL # BLD AUTO: 0.33 10*3/MM3 (ref 0–0.4)
EOSINOPHIL NFR BLD AUTO: 4.4 % (ref 0.3–6.2)
ERYTHROCYTE [DISTWIDTH] IN BLOOD BY AUTOMATED COUNT: 13.1 % (ref 12.3–15.4)
ERYTHROCYTE [SEDIMENTATION RATE] IN BLOOD: 7 MM/HR (ref 0–30)
HCT VFR BLD AUTO: 43.5 % (ref 34–46.6)
HGB BLD-MCNC: 14.8 G/DL (ref 12–15.9)
IMM GRANULOCYTES # BLD AUTO: 0.02 10*3/MM3 (ref 0–0.05)
IMM GRANULOCYTES NFR BLD AUTO: 0.3 % (ref 0–0.5)
LYMPHOCYTES # BLD AUTO: 1.2 10*3/MM3 (ref 0.7–3.1)
LYMPHOCYTES NFR BLD AUTO: 15.8 % (ref 19.6–45.3)
MCH RBC QN AUTO: 35.1 PG (ref 26.6–33)
MCHC RBC AUTO-ENTMCNC: 34 G/DL (ref 31.5–35.7)
MCV RBC AUTO: 103.1 FL (ref 79–97)
MONOCYTES # BLD AUTO: 0.77 10*3/MM3 (ref 0.1–0.9)
MONOCYTES NFR BLD AUTO: 10.2 % (ref 5–12)
NEUTROPHILS NFR BLD AUTO: 5.17 10*3/MM3 (ref 1.7–7)
NEUTROPHILS NFR BLD AUTO: 68.1 % (ref 42.7–76)
NRBC BLD AUTO-RTO: 0 /100 WBC (ref 0–0.2)
PLATELET # BLD AUTO: 245 10*3/MM3 (ref 140–450)
PMV BLD AUTO: 12.2 FL (ref 6–12)
RBC # BLD AUTO: 4.22 10*6/MM3 (ref 3.77–5.28)
WBC NRBC COR # BLD AUTO: 7.58 10*3/MM3 (ref 3.4–10.8)

## 2024-09-06 PROCEDURE — 86140 C-REACTIVE PROTEIN: CPT

## 2024-09-06 PROCEDURE — 36415 COLL VENOUS BLD VENIPUNCTURE: CPT

## 2024-09-06 PROCEDURE — 86480 TB TEST CELL IMMUN MEASURE: CPT

## 2024-09-06 PROCEDURE — 80053 COMPREHEN METABOLIC PANEL: CPT | Performed by: INTERNAL MEDICINE

## 2024-09-06 PROCEDURE — 86803 HEPATITIS C AB TEST: CPT

## 2024-09-06 PROCEDURE — 87340 HEPATITIS B SURFACE AG IA: CPT

## 2024-09-06 PROCEDURE — 85652 RBC SED RATE AUTOMATED: CPT

## 2024-09-06 PROCEDURE — 85025 COMPLETE CBC W/AUTO DIFF WBC: CPT

## 2024-09-07 LAB
ALBUMIN SERPL-MCNC: 4.3 G/DL (ref 3.5–5.2)
ALBUMIN/GLOB SERPL: 1.5 G/DL
ALP SERPL-CCNC: 96 U/L (ref 39–117)
ALT SERPL W P-5'-P-CCNC: 17 U/L (ref 1–33)
ANION GAP SERPL CALCULATED.3IONS-SCNC: 10.6 MMOL/L (ref 5–15)
AST SERPL-CCNC: 21 U/L (ref 1–32)
BILIRUB SERPL-MCNC: 0.4 MG/DL (ref 0–1.2)
BUN SERPL-MCNC: 11 MG/DL (ref 8–23)
BUN/CREAT SERPL: 12.4 (ref 7–25)
CALCIUM SPEC-SCNC: 10.3 MG/DL (ref 8.6–10.5)
CHLORIDE SERPL-SCNC: 101 MMOL/L (ref 98–107)
CO2 SERPL-SCNC: 27.4 MMOL/L (ref 22–29)
CREAT SERPL-MCNC: 0.89 MG/DL (ref 0.57–1)
CRP SERPL-MCNC: <0.3 MG/DL (ref 0–0.5)
EGFRCR SERPLBLD CKD-EPI 2021: 72.5 ML/MIN/1.73
GLOBULIN UR ELPH-MCNC: 2.8 GM/DL
GLUCOSE SERPL-MCNC: 84 MG/DL (ref 65–99)
HBV SURFACE AG SERPL QL IA: NORMAL
HCV AB SER QL: NORMAL
POTASSIUM SERPL-SCNC: 3.7 MMOL/L (ref 3.5–5.2)
PROT SERPL-MCNC: 7.1 G/DL (ref 6–8.5)
SODIUM SERPL-SCNC: 139 MMOL/L (ref 136–145)

## 2024-09-09 LAB
GAMMA INTERFERON BACKGROUND BLD IA-ACNC: 0 IU/ML
M TB IFN-G BLD-IMP: NEGATIVE
M TB IFN-G CD4+ BCKGRND COR BLD-ACNC: 0 IU/ML
M TB IFN-G CD4+CD8+ BCKGRND COR BLD-ACNC: 0 IU/ML
MITOGEN IGNF BCKGRD COR BLD-ACNC: >10 IU/ML
QUANTIFERON INCUBATION: NORMAL
SERVICE CMNT-IMP: NORMAL

## 2024-10-21 ENCOUNTER — OFFICE VISIT (OUTPATIENT)
Dept: INTERNAL MEDICINE | Facility: CLINIC | Age: 64
End: 2024-10-21
Payer: MEDICARE

## 2024-10-21 VITALS
BODY MASS INDEX: 20.62 KG/M2 | TEMPERATURE: 98.6 F | SYSTOLIC BLOOD PRESSURE: 126 MMHG | HEIGHT: 64 IN | DIASTOLIC BLOOD PRESSURE: 70 MMHG | WEIGHT: 120.8 LBS | HEART RATE: 60 BPM

## 2024-10-21 DIAGNOSIS — E89.0 POSTABLATIVE HYPOTHYROIDISM: Chronic | ICD-10-CM

## 2024-10-21 DIAGNOSIS — Z12.31 ENCOUNTER FOR SCREENING MAMMOGRAM FOR MALIGNANT NEOPLASM OF BREAST: ICD-10-CM

## 2024-10-21 DIAGNOSIS — Z00.00 WELCOME TO MEDICARE PREVENTIVE VISIT: Primary | ICD-10-CM

## 2024-10-21 DIAGNOSIS — M06.9 RHEUMATOID ARTHRITIS INVOLVING MULTIPLE SITES, UNSPECIFIED WHETHER RHEUMATOID FACTOR PRESENT: Chronic | ICD-10-CM

## 2024-10-21 DIAGNOSIS — R10.13 EPIGASTRIC ABDOMINAL PAIN: ICD-10-CM

## 2024-10-21 DIAGNOSIS — R63.4 WEIGHT LOSS: ICD-10-CM

## 2024-10-21 DIAGNOSIS — M17.11 PRIMARY OSTEOARTHRITIS OF RIGHT KNEE: Chronic | ICD-10-CM

## 2024-10-21 DIAGNOSIS — Z23 NEED FOR PNEUMOCOCCAL 20-VALENT CONJUGATE VACCINATION: ICD-10-CM

## 2024-10-21 DIAGNOSIS — Z00.00 WELL ADULT EXAM: ICD-10-CM

## 2024-10-21 DIAGNOSIS — R73.03 PREDIABETES: Chronic | ICD-10-CM

## 2024-10-21 DIAGNOSIS — M54.16 LUMBAR BACK PAIN WITH RADICULOPATHY AFFECTING RIGHT LOWER EXTREMITY: Chronic | ICD-10-CM

## 2024-10-21 DIAGNOSIS — E78.2 MIXED HYPERLIPIDEMIA: Chronic | ICD-10-CM

## 2024-10-21 DIAGNOSIS — Z23 NEED FOR INFLUENZA VACCINATION: ICD-10-CM

## 2024-10-21 PROBLEM — R79.89 ELEVATED SERUM CREATININE: Status: RESOLVED | Noted: 2023-10-19 | Resolved: 2024-10-21

## 2024-10-21 PROCEDURE — 90656 IIV3 VACC NO PRSV 0.5 ML IM: CPT | Performed by: STUDENT IN AN ORGANIZED HEALTH CARE EDUCATION/TRAINING PROGRAM

## 2024-10-21 PROCEDURE — 1125F AMNT PAIN NOTED PAIN PRSNT: CPT | Performed by: STUDENT IN AN ORGANIZED HEALTH CARE EDUCATION/TRAINING PROGRAM

## 2024-10-21 PROCEDURE — G0008 ADMIN INFLUENZA VIRUS VAC: HCPCS | Performed by: STUDENT IN AN ORGANIZED HEALTH CARE EDUCATION/TRAINING PROGRAM

## 2024-10-21 PROCEDURE — 90677 PCV20 VACCINE IM: CPT | Performed by: STUDENT IN AN ORGANIZED HEALTH CARE EDUCATION/TRAINING PROGRAM

## 2024-10-21 PROCEDURE — G0009 ADMIN PNEUMOCOCCAL VACCINE: HCPCS | Performed by: STUDENT IN AN ORGANIZED HEALTH CARE EDUCATION/TRAINING PROGRAM

## 2024-10-21 PROCEDURE — 3078F DIAST BP <80 MM HG: CPT | Performed by: STUDENT IN AN ORGANIZED HEALTH CARE EDUCATION/TRAINING PROGRAM

## 2024-10-21 PROCEDURE — 99214 OFFICE O/P EST MOD 30 MIN: CPT | Performed by: STUDENT IN AN ORGANIZED HEALTH CARE EDUCATION/TRAINING PROGRAM

## 2024-10-21 PROCEDURE — 99396 PREV VISIT EST AGE 40-64: CPT | Performed by: STUDENT IN AN ORGANIZED HEALTH CARE EDUCATION/TRAINING PROGRAM

## 2024-10-21 PROCEDURE — 1159F MED LIST DOCD IN RCRD: CPT | Performed by: STUDENT IN AN ORGANIZED HEALTH CARE EDUCATION/TRAINING PROGRAM

## 2024-10-21 PROCEDURE — G0402 INITIAL PREVENTIVE EXAM: HCPCS | Performed by: STUDENT IN AN ORGANIZED HEALTH CARE EDUCATION/TRAINING PROGRAM

## 2024-10-21 PROCEDURE — 96160 PT-FOCUSED HLTH RISK ASSMT: CPT | Performed by: STUDENT IN AN ORGANIZED HEALTH CARE EDUCATION/TRAINING PROGRAM

## 2024-10-21 PROCEDURE — 3044F HG A1C LEVEL LT 7.0%: CPT | Performed by: STUDENT IN AN ORGANIZED HEALTH CARE EDUCATION/TRAINING PROGRAM

## 2024-10-21 PROCEDURE — 1160F RVW MEDS BY RX/DR IN RCRD: CPT | Performed by: STUDENT IN AN ORGANIZED HEALTH CARE EDUCATION/TRAINING PROGRAM

## 2024-10-21 PROCEDURE — 3074F SYST BP LT 130 MM HG: CPT | Performed by: STUDENT IN AN ORGANIZED HEALTH CARE EDUCATION/TRAINING PROGRAM

## 2024-10-21 RX ORDER — METHOTREXATE 2.5 MG/1
6 TABLET ORAL WEEKLY
COMMUNITY

## 2024-10-21 RX ORDER — FOLIC ACID 1 MG/1
1 TABLET ORAL DAILY
COMMUNITY
Start: 2024-06-19

## 2024-10-25 ENCOUNTER — LAB (OUTPATIENT)
Dept: LAB | Facility: HOSPITAL | Age: 64
End: 2024-10-25
Payer: MEDICARE

## 2024-10-25 DIAGNOSIS — E78.2 MIXED HYPERLIPIDEMIA: Chronic | ICD-10-CM

## 2024-10-25 DIAGNOSIS — R73.03 PREDIABETES: Chronic | ICD-10-CM

## 2024-10-25 DIAGNOSIS — E89.0 POSTABLATIVE HYPOTHYROIDISM: Chronic | ICD-10-CM

## 2024-10-25 LAB
CHOLEST SERPL-MCNC: 152 MG/DL (ref 0–200)
HBA1C MFR BLD: 5.7 % (ref 4.8–5.6)
HDLC SERPL-MCNC: 69 MG/DL (ref 40–60)
LDLC SERPL CALC-MCNC: 74 MG/DL (ref 0–100)
LDLC/HDLC SERPL: 1.1 {RATIO}
T4 FREE SERPL-MCNC: 1.37 NG/DL (ref 0.92–1.68)
TRIGL SERPL-MCNC: 37 MG/DL (ref 0–150)
TSH SERPL DL<=0.05 MIU/L-ACNC: 5.11 UIU/ML (ref 0.27–4.2)
VLDLC SERPL-MCNC: 9 MG/DL (ref 5–40)

## 2024-10-25 PROCEDURE — 84443 ASSAY THYROID STIM HORMONE: CPT

## 2024-10-25 PROCEDURE — 84439 ASSAY OF FREE THYROXINE: CPT

## 2024-10-25 PROCEDURE — 80061 LIPID PANEL: CPT

## 2024-10-25 PROCEDURE — 83036 HEMOGLOBIN GLYCOSYLATED A1C: CPT

## 2024-10-28 DIAGNOSIS — E89.0 POSTABLATIVE HYPOTHYROIDISM: Chronic | ICD-10-CM

## 2024-10-28 RX ORDER — LEVOTHYROXINE SODIUM 112 UG/1
112 TABLET ORAL DAILY
Qty: 90 TABLET | Refills: 1 | Status: SHIPPED | OUTPATIENT
Start: 2024-10-28

## 2024-11-06 ENCOUNTER — HOSPITAL ENCOUNTER (OUTPATIENT)
Dept: MAMMOGRAPHY | Facility: HOSPITAL | Age: 64
Discharge: HOME | End: 2024-11-06
Admitting: STUDENT IN AN ORGANIZED HEALTH CARE EDUCATION/TRAINING PROGRAM
Payer: MEDICARE

## 2024-11-06 DIAGNOSIS — Z12.31 ENCOUNTER FOR SCREENING MAMMOGRAM FOR MALIGNANT NEOPLASM OF BREAST: ICD-10-CM

## 2024-11-06 PROCEDURE — 77063 BREAST TOMOSYNTHESIS BI: CPT

## 2024-11-06 PROCEDURE — 77067 SCR MAMMO BI INCL CAD: CPT

## 2024-12-18 ENCOUNTER — OFFICE VISIT (OUTPATIENT)
Dept: GASTROENTEROLOGY | Facility: CLINIC | Age: 64
End: 2024-12-18
Payer: MEDICARE

## 2024-12-18 VITALS
HEART RATE: 67 BPM | SYSTOLIC BLOOD PRESSURE: 108 MMHG | DIASTOLIC BLOOD PRESSURE: 68 MMHG | WEIGHT: 115 LBS | RESPIRATION RATE: 24 BRPM | OXYGEN SATURATION: 100 % | BODY MASS INDEX: 19.63 KG/M2 | TEMPERATURE: 97.8 F | HEIGHT: 64 IN

## 2024-12-18 DIAGNOSIS — K31.A11 INTESTINAL METAPLASIA OF ANTRUM OF STOMACH WITHOUT DYSPLASIA: Primary | ICD-10-CM

## 2024-12-18 PROCEDURE — 3078F DIAST BP <80 MM HG: CPT | Performed by: INTERNAL MEDICINE

## 2024-12-18 PROCEDURE — 99214 OFFICE O/P EST MOD 30 MIN: CPT | Performed by: INTERNAL MEDICINE

## 2024-12-18 PROCEDURE — 3074F SYST BP LT 130 MM HG: CPT | Performed by: INTERNAL MEDICINE

## 2024-12-18 PROCEDURE — 1159F MED LIST DOCD IN RCRD: CPT | Performed by: INTERNAL MEDICINE

## 2024-12-18 PROCEDURE — 1160F RVW MEDS BY RX/DR IN RCRD: CPT | Performed by: INTERNAL MEDICINE

## 2024-12-18 NOTE — PROGRESS NOTES
PCP: Morgan Wong MD    No chief complaint on file.      History of Present Illness:   Huma Montano is a 64 y.o. female who presents to GI clinic as a follow up for gastric intestinal metaplasia.  Follows with rheumatology for rheumatoid arthritis. Denies daily epigastric pain or nausea.     Past Medical History:   Diagnosis Date    ADHD (attention deficit hyperactivity disorder)     Allergic     Anxiety     Arthritis     Back pain     Claustrophobia     Colon polyp 10/06/2022    Benign    DDD (degenerative disc disease), lumbar 08/10/2022    Depression     Essential hypertension 08/10/2022    Fibromyalgia, primary     GERD (gastroesophageal reflux disease)     Prescribed omeprozole    Graves disease     Headache     Heartburn     Hernia of abdominal wall     Hyperlipidemia     Hypertension     Hyperthyroidism     Idiopathic scoliosis of thoracolumbar region 08/10/2022    Injury of back     Kyphosis of cervical region     Low back pain     Mixed hyperlipidemia 08/10/2022    Obesity     MELISSA on CPAP 08/10/2022    Postablative hypothyroidism 2021    S/p I 131 therapy for graves in     Prediabetes     Scoliosis     Sleep apnea     Spinal stenosis     Stage 3a chronic kidney disease 2024    Urinary tract infection     Visual impairment     Wear glasses    Wears glasses        Past Surgical History:   Procedure Laterality Date    ADENOIDECTOMY  1963    BREAST BIOPSY Right 12/10/2015    US bx    BREAST EXCISIONAL BIOPSY Right 2016     SECTION      COLONOSCOPY      CYST REMOVAL      Breast    CYST REMOVAL      Cervical cyst    EYE SURGERY      Lasix    HERNIA REPAIR      INGUINAL HERNIA REPAIR      JOINT REPLACEMENT  23    R TKR    LUMBAR LAMINECTOMY WITH FUSION N/A 2017    Procedure: L5-S1 OSTEOTOMY; L2-L3,L3-L4 AND L5-S1 INTERBODY FUSIONS; L2 TO S1 POSTERIOR FUSION WITH PEDICLE SCREWS AND BONE GRAFT;  Surgeon: Connor Lopez MD;  Location: [facility]  OR;  Service:     MENISCECTOMY  2020    ROTATOR CUFF REPAIR      3x    SEPTOPLASTY      SEPTOPLASTY  2014    Bilateral    SPINAL FUSION  2010    STEROID INJECTION      LUMBAR    TONSILLECTOMY      TUBAL ABDOMINAL LIGATION  1993    UMBILICAL HERNIA REPAIR  2018         Current Outpatient Medications:     acetaminophen (TYLENOL) 500 MG tablet, Take 1 tablet by mouth Every 6 (Six) Hours As Needed for Mild Pain., Disp: , Rfl:     atorvastatin (LIPITOR) 80 MG tablet, Take 1 tablet by mouth Every Night. At bedtime, Disp: 90 tablet, Rfl: 3    cetirizine (zyrTEC) 10 MG tablet, Take 1 tablet by mouth Daily., Disp: 90 tablet, Rfl: 3    cholecalciferol (VITAMIN D3) 25 MCG (1000 UT) tablet, Take 1 tablet by mouth Daily., Disp: 90 tablet, Rfl: 3    Diclofenac Sodium (VOLTAREN) 1 % gel gel, APPLY 2 TO 4 GRAMS TO THE APPROPRIATE AREA AS DIRECTED TWO TIMES A DAY, Disp: 400 g, Rfl: 1    ESTRADIOL-PROGESTERONE PO, Take  by mouth Daily. Estrodiol 2 mg - progesterone 100 mg -testosterone 3 mg  daily, Disp: , Rfl:     fluticasone (FLONASE) 50 MCG/ACT nasal spray, 2 sprays into the nostril(s) as directed by provider Daily. 1-2 sprays daily, Disp: 54.6 mL, Rfl: 6    folic acid (FOLVITE) 1 MG tablet, Take 1 tablet by mouth Daily., Disp: , Rfl:     gabapentin (NEURONTIN) 600 MG tablet, Take 1 tablet by mouth 2 (Two) Times a Day., Disp: 1 tablet, Rfl: 0    hydroxychloroquine (PLAQUENIL) 200 MG tablet, Take 1 tablet by mouth 2 (Two) Times a Day., Disp: , Rfl:     levothyroxine (SYNTHROID, LEVOTHROID) 112 MCG tablet, Take 1 tablet by mouth Daily., Disp: 90 tablet, Rfl: 1    lisinopril (PRINIVIL,ZESTRIL) 10 MG tablet, Take 1 tablet by mouth Daily., Disp: 90 tablet, Rfl: 3    metFORMIN ER (GLUCOPHAGE-XR) 500 MG 24 hr tablet, Take 1 tablet by mouth Daily With Breakfast., Disp: 90 tablet, Rfl: 3    methotrexate 2.5 MG tablet, Take 6 tablets by mouth 1 (One) Time Per Week., Disp: , Rfl:     Multiple Vitamins-Minerals (MULTIVITAL PO), Take 1 tablet  "by mouth Daily., Disp: , Rfl:     Nutritional Supplements (DHEA PO), Take  by mouth. 1  100 mg capsule 3 times a week, Disp: , Rfl:     ondansetron (ZOFRAN) 4 MG tablet, Take 1 tablet by mouth Every 4 (Four) Hours As Needed for Nausea or Vomiting (CAN TAKE EVERY 4-6 HRS PRN)., Disp: 30 tablet, Rfl: 1    traMADol (ULTRAM) 50 MG tablet, Take 1 tablet by mouth Every 6 (Six) Hours As Needed for Moderate Pain ., Disp: 60 tablet, Rfl: 2    traMADol ER (ULTRAM ER) 300 MG 24 hr tablet, Take 1 tablet by mouth Daily., Disp: 60 tablet, Rfl: 0    Unable to find, HRT compound  4 clicks daily, Disp: , Rfl:     vitamin B-12 (CYANOCOBALAMIN) 1000 MCG tablet, Take 1 tablet by mouth Daily., Disp: , Rfl:     Allergies   Allergen Reactions    Erythromycin Nausea And Vomiting and GI Intolerance     \"INTENSE STOMACH PAIN\"    Lortab [Hydrocodone-Acetaminophen] Other (See Comments)     SKIN BREAKDOWN       Family History   Problem Relation Age of Onset    Hyperlipidemia Mother         controlled with medication    Diabetes Mother     Arthritis Mother     Heart disease Mother     Hypertension Mother     Arthritis Father         OA    Heart disease Father         pacemaker    Hyperlipidemia Father         Controlled with medication    Anxiety disorder Sister     Depression Sister     Thyroid disease Sister     Arthritis Sister     Heart attack Sister     Migraines Sister     Heart disease Sister     Anxiety disorder Son     Arthritis Son         OA    Anxiety disorder Son     ADD / ADHD Daughter     Arthritis Maternal Grandmother         RA    Diabetes Maternal Grandmother     Stroke Maternal Grandmother     Arthritis Sister         OA, RA?, fibromyalgia, mast cell activation    Hyperlipidemia Sister         Controlled with medication    Arthritis Sister         RA    Arthritis Paternal Grandmother     Diabetes Paternal Grandmother     Stroke Paternal Grandmother     Thyroid disease Sister     Ovarian cancer Neg Hx     Breast cancer Neg Hx  "       Social History     Socioeconomic History    Marital status:    Tobacco Use    Smoking status: Former     Current packs/day: 0.00     Average packs/day: 0.3 packs/day for 1.2 years (0.3 ttl pk-yrs)     Types: Cigarettes     Start date: 1979     Quit date: 1980     Years since quittin.9    Smokeless tobacco: Never    Tobacco comments:     social smoker in college   Vaping Use    Vaping status: Never Used   Substance and Sexual Activity    Alcohol use: Not Currently     Alcohol/week: 0.0 - 1.0 standard drinks of alcohol     Comment: occasional glass of wine or cocktail w/dinner 2-4 x month    Drug use: No    Sexual activity: Not Currently     Partners: Male     Birth control/protection: Post-menopausal, Tubal ligation, Surgical     Comment: Tubal ligation        Review of Systems  A complete 12 point ros was asked and is negative except for that mentioned above.  In particular:  No fever  No rash  No increased arthralgias  No worsening edema  No cough  No dyspnea  No chest pain      There were no vitals filed for this visit.    Physical Exam  General: well developed, well nourished  A+O x 3 NAD  HEENT: NCAT, pupils equal appearing, sclera appear white  NECK: full ROM  Respiratory: symmetric chest rise, normal effort, normal work of breathing, no overt rales  Abdomen: non-distended  Skin: normal color, no jaundice  Neuro: no tremor, no facial droop  Psych: normal mood and affect      Assessment/Plan    Workup: egd 24: normal esophagus, patchy gastritis, small hiatal hernia, normal duodenum, biopsied, focal intestinal metaplasia, no h. pylori    Colonoscopy: 10/6/22: adequate prep, diverticulosis throughout colon, 2 polyps repeat     1.) Gastritis  Continue ppi  Avoid irritants such as nsaids if possible    2.) Intestinal metaplasia of the stomach    Discussed that this is a controversial indication for surveillance, more robust data for surveillance if intestinal metaplasia  proximal to gastric incisura.  Will plan repeat egd in 1 year for gastric mapping. Discussed empiric therapy for h. Pylori at this time, will not proceed to h. Pylori therapy.    3.) HCM  Repeat colonoscopy 2027    Lefty Lawrence MD  12/18/2024

## 2025-02-04 ENCOUNTER — PATIENT MESSAGE (OUTPATIENT)
Dept: INTERNAL MEDICINE | Facility: CLINIC | Age: 65
End: 2025-02-04
Payer: MEDICARE

## 2025-02-04 DIAGNOSIS — M54.16 LUMBAR BACK PAIN WITH RADICULOPATHY AFFECTING RIGHT LOWER EXTREMITY: Chronic | ICD-10-CM

## 2025-02-04 DIAGNOSIS — M41.25 IDIOPATHIC SCOLIOSIS OF THORACOLUMBAR REGION: Chronic | ICD-10-CM

## 2025-02-04 DIAGNOSIS — M48.05 SPINAL STENOSIS, THORACOLUMBAR REGION: Chronic | ICD-10-CM

## 2025-02-04 DIAGNOSIS — M06.9 RHEUMATOID ARTHRITIS INVOLVING MULTIPLE SITES, UNSPECIFIED WHETHER RHEUMATOID FACTOR PRESENT: Chronic | ICD-10-CM

## 2025-02-04 DIAGNOSIS — M47.812 SPONDYLOSIS OF CERVICAL REGION WITHOUT MYELOPATHY OR RADICULOPATHY: Primary | Chronic | ICD-10-CM

## 2025-02-10 ENCOUNTER — OFFICE VISIT (OUTPATIENT)
Dept: INTERNAL MEDICINE | Facility: CLINIC | Age: 65
End: 2025-02-10
Payer: MEDICARE

## 2025-02-10 VITALS
DIASTOLIC BLOOD PRESSURE: 70 MMHG | TEMPERATURE: 98.2 F | HEIGHT: 64 IN | SYSTOLIC BLOOD PRESSURE: 110 MMHG | WEIGHT: 108.8 LBS | HEART RATE: 60 BPM | BODY MASS INDEX: 18.57 KG/M2

## 2025-02-10 DIAGNOSIS — Z13.21 ENCOUNTER FOR VITAMIN DEFICIENCY SCREENING: ICD-10-CM

## 2025-02-10 DIAGNOSIS — R73.03 PREDIABETES: Primary | ICD-10-CM

## 2025-02-10 DIAGNOSIS — R63.6 UNDERWEIGHT: ICD-10-CM

## 2025-02-10 DIAGNOSIS — M06.9 RHEUMATOID ARTHRITIS INVOLVING MULTIPLE SITES, UNSPECIFIED WHETHER RHEUMATOID FACTOR PRESENT: Chronic | ICD-10-CM

## 2025-02-10 DIAGNOSIS — R63.4 WEIGHT LOSS: ICD-10-CM

## 2025-02-10 DIAGNOSIS — E89.0 POSTABLATIVE HYPOTHYROIDISM: Chronic | ICD-10-CM

## 2025-02-10 DIAGNOSIS — N18.31 STAGE 3A CHRONIC KIDNEY DISEASE: Chronic | ICD-10-CM

## 2025-02-10 LAB
EXPIRATION DATE: ABNORMAL
HBA1C MFR BLD: 6.1 % (ref 4.5–5.7)
Lab: ABNORMAL

## 2025-02-10 PROCEDURE — 3078F DIAST BP <80 MM HG: CPT | Performed by: STUDENT IN AN ORGANIZED HEALTH CARE EDUCATION/TRAINING PROGRAM

## 2025-02-10 PROCEDURE — 83036 HEMOGLOBIN GLYCOSYLATED A1C: CPT | Performed by: STUDENT IN AN ORGANIZED HEALTH CARE EDUCATION/TRAINING PROGRAM

## 2025-02-10 PROCEDURE — 99214 OFFICE O/P EST MOD 30 MIN: CPT | Performed by: STUDENT IN AN ORGANIZED HEALTH CARE EDUCATION/TRAINING PROGRAM

## 2025-02-10 PROCEDURE — 3044F HG A1C LEVEL LT 7.0%: CPT | Performed by: STUDENT IN AN ORGANIZED HEALTH CARE EDUCATION/TRAINING PROGRAM

## 2025-02-10 PROCEDURE — 3074F SYST BP LT 130 MM HG: CPT | Performed by: STUDENT IN AN ORGANIZED HEALTH CARE EDUCATION/TRAINING PROGRAM

## 2025-02-10 PROCEDURE — 1125F AMNT PAIN NOTED PAIN PRSNT: CPT | Performed by: STUDENT IN AN ORGANIZED HEALTH CARE EDUCATION/TRAINING PROGRAM

## 2025-02-10 PROCEDURE — 1159F MED LIST DOCD IN RCRD: CPT | Performed by: STUDENT IN AN ORGANIZED HEALTH CARE EDUCATION/TRAINING PROGRAM

## 2025-02-10 PROCEDURE — 1160F RVW MEDS BY RX/DR IN RCRD: CPT | Performed by: STUDENT IN AN ORGANIZED HEALTH CARE EDUCATION/TRAINING PROGRAM

## 2025-02-10 RX ORDER — MIRTAZAPINE 7.5 MG/1
7.5 TABLET, FILM COATED ORAL NIGHTLY
Qty: 30 TABLET | Refills: 0 | Status: SHIPPED | OUTPATIENT
Start: 2025-02-10

## 2025-02-10 NOTE — PROGRESS NOTES
Chief Complaint  Huma Montano is a 64 y.o. female presenting for Weight Loss and Back Pain.     From [place]. Lived in [place] for many years. , lives by herself - daughter lives with her PRN. She has 3 adult children. 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     Patient has a past medical history of hypertension, hyperlipidemia, RA (f/u rheum), CKD 2-3a, Graves' disease s/p radioactive iodine ablation on Synthroid, prediabetes, MELISSA on CPAP, allergic rhinitis, ADHD (was on Adderall, but psych didn't continue due to other controlled meds).  She has chronic musculoskeletal pain related to multiple injuries and scoliosis, spinal stenosis, s/p spinal surgery (2017, fusion L2-S1), followed by pain management.    History of Present Illness  Patient is here for follow-up.    She has been experiencing worsening pain recently, she has establish care with new pain management.  Dr. Gonzalez gave her epidural with limited effect.  She plans to see him back.  They also have discussed spinal cord stimulator.  She feels her pain is affecting her appetite, and instead of gaining weight she has lost weight.  She has bought boost drink taking once a day.    She has new insurance, and is concerned they might not cover her extended release metformin.  She did have diarrhea on the regular metformin.    She continues to follow-up with Dr. Nielsen for her RA every 3 months.      Answers submitted by the patient for this visit:  Weight Management (Submitted on 2/8/2025)  Chief Complaint: Weight Management  Weight: decreased  Weight change (lbs): 7  Eating habit changes: Reduced appetite  Energy level: decreased  Physical activity tolerance: worse  Additional information: Pain causes decreased appetite.  Currently seeing new pain mgmt MD      The following portions of the patient's history were reviewed and updated as appropriate: allergies, current  "medications, past family history, past medical history, past social history, past surgical history, and problem list.    Objective  /70 (BP Location: Left arm, Patient Position: Sitting, Cuff Size: Adult)   Pulse 60   Temp 98.2 °F (36.8 °C) (Temporal)   Ht 163.5 cm (64.37\")   Wt 49.4 kg (108 lb 12.8 oz)   BMI 18.46 kg/m²     Physical Exam  Constitutional:       General: She is in acute distress.      Appearance: Normal appearance.      Comments: Somewhat uncomfortable and in pain.   Eyes:      Extraocular Movements: Extraocular movements intact.      Conjunctiva/sclera: Conjunctivae normal.   Pulmonary:      Effort: Pulmonary effort is normal. No respiratory distress.   Musculoskeletal:      Cervical back: Normal range of motion and neck supple.   Neurological:      Mental Status: She is alert and oriented to person, place, and time. Mental status is at baseline.   Psychiatric:         Behavior: Behavior normal.         Thought Content: Thought content normal.         Assessment/Plan   1. Prediabetes  Hemoglobin A1C   Date Value Ref Range Status   02/10/2025 6.1 (A) 4.5 - 5.7 % Final   10/25/2024 5.70 (H) 4.80 - 5.60 % Final   05/24/2024 6.00 (H) 4.80 - 5.60 % Final   08/25/2023 6.00 (H) 4.80 - 5.60 % Final   Worsening glycemic control.  Patient never had a diagnosis of diabetes.  Would recommend to avoid high carb boosts.  Will refer to nutrition.  Continue on metformin.  - POC Glycosylated Hemoglobin (Hb A1C)  - Ambulatory Referral to Nutrition Services    2. Weight loss  3. Underweight  Wt Readings from Last 3 Encounters:   02/10/25 49.4 kg (108 lb 12.8 oz)   12/18/24 52.2 kg (115 lb)   10/21/24 54.8 kg (120 lb 12.8 oz)   Downtrending weight, appears to be related to her pain.  Will do trial of mirtazapine.  Referral to nutrition.  - Ambulatory Referral to Nutrition Services  - mirtazapine (REMERON) 7.5 MG tablet; Take 1 tablet by mouth Every Night.  Dispense: 30 tablet; Refill: 0    4. Stage 3a " chronic kidney disease  Will recheck CMP within the next month or 2  - Comprehensive Metabolic Panel; Future    5. Rheumatoid arthritis involving multiple sites, unspecified whether rheumatoid factor present  Continue follow-up with rheumatology.  Recheck CBC.  - CBC & Differential; Future    6. Postablative hypothyroidism  We did previously increase her dose of Synthroid.  Will recheck before next visit  - T4, Free; Future  - TSH; Future    7. Encounter for vitamin deficiency screening  She ran out of B complex, but has been taking regularly.  - Vitamin B12; Future  - Folate; Future      Return if symptoms worsen or fail to improve, for Next scheduled follow up.    Future Appointments           Provider Department Center     4/21/2025 9:00 AM Morgan Wong MD [facility] INTERNAL MEDICINE [place]     7/30/2025 9:30 AM (Arrive by 9:00 AM) Lefty Lawrence MD [facility] GASTROENTEROLOGY [place]     12/22/2025 2:45 PM (Arrive by 2:15 PM) Lefty Lawrence MD [facility] GASTROENTEROLOGY [place]              Morgan Wong MD  Family Medicine  02/10/2025    Patient has been erroneously marked as diabetic. Based on the available clinical information, she does not have diabetes and should therefore be excluded from diabetic health maintenance and quality measures for the remainder of the reporting period.

## 2025-03-05 ENCOUNTER — LAB (OUTPATIENT)
Dept: LAB | Facility: HOSPITAL | Age: 65
End: 2025-03-05
Payer: MEDICARE

## 2025-03-05 DIAGNOSIS — M06.9 RHEUMATOID ARTHRITIS INVOLVING MULTIPLE SITES, UNSPECIFIED WHETHER RHEUMATOID FACTOR PRESENT: ICD-10-CM

## 2025-03-05 DIAGNOSIS — N18.31 STAGE 3A CHRONIC KIDNEY DISEASE: Chronic | ICD-10-CM

## 2025-03-05 DIAGNOSIS — Z13.21 ENCOUNTER FOR VITAMIN DEFICIENCY SCREENING: ICD-10-CM

## 2025-03-05 DIAGNOSIS — E89.0 POSTABLATIVE HYPOTHYROIDISM: Chronic | ICD-10-CM

## 2025-03-05 LAB
ALBUMIN SERPL-MCNC: 4.1 G/DL (ref 3.5–5.2)
ALBUMIN/GLOB SERPL: 1.8 G/DL
ALP SERPL-CCNC: 91 U/L (ref 39–117)
ALT SERPL W P-5'-P-CCNC: 29 U/L (ref 1–33)
ANION GAP SERPL CALCULATED.3IONS-SCNC: 5.9 MMOL/L (ref 5–15)
AST SERPL-CCNC: 29 U/L (ref 1–32)
BASOPHILS # BLD AUTO: 0.05 10*3/MM3 (ref 0–0.2)
BASOPHILS NFR BLD AUTO: 0.6 % (ref 0–1.5)
BILIRUB SERPL-MCNC: 0.4 MG/DL (ref 0–1.2)
BUN SERPL-MCNC: 14 MG/DL (ref 8–23)
BUN/CREAT SERPL: 17.3 (ref 7–25)
CALCIUM SPEC-SCNC: 10 MG/DL (ref 8.6–10.5)
CHLORIDE SERPL-SCNC: 101 MMOL/L (ref 98–107)
CO2 SERPL-SCNC: 29.1 MMOL/L (ref 22–29)
CREAT SERPL-MCNC: 0.81 MG/DL (ref 0.57–1)
DEPRECATED RDW RBC AUTO: 50.8 FL (ref 37–54)
EGFRCR SERPLBLD CKD-EPI 2021: 81.2 ML/MIN/1.73
EOSINOPHIL # BLD AUTO: 0.07 10*3/MM3 (ref 0–0.4)
EOSINOPHIL NFR BLD AUTO: 0.8 % (ref 0.3–6.2)
ERYTHROCYTE [DISTWIDTH] IN BLOOD BY AUTOMATED COUNT: 13.2 % (ref 12.3–15.4)
FOLATE SERPL-MCNC: >20 NG/ML (ref 4.78–24.2)
GLOBULIN UR ELPH-MCNC: 2.3 GM/DL
GLUCOSE SERPL-MCNC: 77 MG/DL (ref 65–99)
HCT VFR BLD AUTO: 42 % (ref 34–46.6)
HGB BLD-MCNC: 14.6 G/DL (ref 12–15.9)
IMM GRANULOCYTES # BLD AUTO: 0.02 10*3/MM3 (ref 0–0.05)
IMM GRANULOCYTES NFR BLD AUTO: 0.2 % (ref 0–0.5)
LYMPHOCYTES # BLD AUTO: 1.77 10*3/MM3 (ref 0.7–3.1)
LYMPHOCYTES NFR BLD AUTO: 21 % (ref 19.6–45.3)
MCH RBC QN AUTO: 36.1 PG (ref 26.6–33)
MCHC RBC AUTO-ENTMCNC: 34.8 G/DL (ref 31.5–35.7)
MCV RBC AUTO: 104 FL (ref 79–97)
MONOCYTES # BLD AUTO: 1.06 10*3/MM3 (ref 0.1–0.9)
MONOCYTES NFR BLD AUTO: 12.6 % (ref 5–12)
NEUTROPHILS NFR BLD AUTO: 5.44 10*3/MM3 (ref 1.7–7)
NEUTROPHILS NFR BLD AUTO: 64.8 % (ref 42.7–76)
NRBC BLD AUTO-RTO: 0 /100 WBC (ref 0–0.2)
PLATELET # BLD AUTO: 343 10*3/MM3 (ref 140–450)
PMV BLD AUTO: 10.6 FL (ref 6–12)
POTASSIUM SERPL-SCNC: 4.2 MMOL/L (ref 3.5–5.2)
PROT SERPL-MCNC: 6.4 G/DL (ref 6–8.5)
RBC # BLD AUTO: 4.04 10*6/MM3 (ref 3.77–5.28)
SODIUM SERPL-SCNC: 136 MMOL/L (ref 136–145)
T4 FREE SERPL-MCNC: 1.76 NG/DL (ref 0.92–1.68)
TSH SERPL DL<=0.05 MIU/L-ACNC: 1.26 UIU/ML (ref 0.27–4.2)
VIT B12 BLD-MCNC: 555 PG/ML (ref 211–946)
WBC NRBC COR # BLD AUTO: 8.41 10*3/MM3 (ref 3.4–10.8)

## 2025-03-05 PROCEDURE — 80053 COMPREHEN METABOLIC PANEL: CPT

## 2025-03-05 PROCEDURE — 84443 ASSAY THYROID STIM HORMONE: CPT

## 2025-03-05 PROCEDURE — 82607 VITAMIN B-12: CPT

## 2025-03-05 PROCEDURE — 82746 ASSAY OF FOLIC ACID SERUM: CPT

## 2025-03-05 PROCEDURE — 84439 ASSAY OF FREE THYROXINE: CPT

## 2025-03-05 PROCEDURE — 85025 COMPLETE CBC W/AUTO DIFF WBC: CPT

## 2025-03-10 DIAGNOSIS — R63.4 WEIGHT LOSS: ICD-10-CM

## 2025-03-10 DIAGNOSIS — R63.6 UNDERWEIGHT: ICD-10-CM

## 2025-03-10 RX ORDER — MIRTAZAPINE 7.5 MG/1
7.5 TABLET, FILM COATED ORAL NIGHTLY
Qty: 30 TABLET | Refills: 5 | Status: SHIPPED | OUTPATIENT
Start: 2025-03-10

## 2025-04-21 ENCOUNTER — OFFICE VISIT (OUTPATIENT)
Dept: INTERNAL MEDICINE | Facility: CLINIC | Age: 65
End: 2025-04-21
Payer: MEDICARE

## 2025-04-21 VITALS
HEIGHT: 64 IN | SYSTOLIC BLOOD PRESSURE: 124 MMHG | DIASTOLIC BLOOD PRESSURE: 84 MMHG | TEMPERATURE: 98 F | HEART RATE: 60 BPM | BODY MASS INDEX: 20.49 KG/M2 | WEIGHT: 120 LBS

## 2025-04-21 DIAGNOSIS — I10 ESSENTIAL HYPERTENSION: Primary | Chronic | ICD-10-CM

## 2025-04-21 DIAGNOSIS — N18.31 STAGE 3A CHRONIC KIDNEY DISEASE: Chronic | ICD-10-CM

## 2025-04-21 DIAGNOSIS — E89.0 POSTABLATIVE HYPOTHYROIDISM: Chronic | ICD-10-CM

## 2025-04-21 DIAGNOSIS — E53.8 LOW SERUM VITAMIN B12: ICD-10-CM

## 2025-04-21 DIAGNOSIS — R63.4 WEIGHT LOSS: ICD-10-CM

## 2025-04-21 DIAGNOSIS — J30.9 ALLERGIC RHINITIS, UNSPECIFIED SEASONALITY, UNSPECIFIED TRIGGER: Chronic | ICD-10-CM

## 2025-04-21 DIAGNOSIS — M06.9 RHEUMATOID ARTHRITIS INVOLVING MULTIPLE SITES, UNSPECIFIED WHETHER RHEUMATOID FACTOR PRESENT: Chronic | ICD-10-CM

## 2025-04-21 DIAGNOSIS — M54.16 LUMBAR BACK PAIN WITH RADICULOPATHY AFFECTING RIGHT LOWER EXTREMITY: Chronic | ICD-10-CM

## 2025-04-21 DIAGNOSIS — E78.2 MIXED HYPERLIPIDEMIA: Chronic | ICD-10-CM

## 2025-04-21 DIAGNOSIS — R73.03 PREDIABETES: Chronic | ICD-10-CM

## 2025-04-21 DIAGNOSIS — E55.9 VITAMIN D DEFICIENCY: Chronic | ICD-10-CM

## 2025-04-21 PROBLEM — M96.1 LUMBAR POST-LAMINECTOMY SYNDROME: Status: ACTIVE | Noted: 2025-02-07

## 2025-04-21 PROCEDURE — 1125F AMNT PAIN NOTED PAIN PRSNT: CPT | Performed by: STUDENT IN AN ORGANIZED HEALTH CARE EDUCATION/TRAINING PROGRAM

## 2025-04-21 PROCEDURE — 3044F HG A1C LEVEL LT 7.0%: CPT | Performed by: STUDENT IN AN ORGANIZED HEALTH CARE EDUCATION/TRAINING PROGRAM

## 2025-04-21 PROCEDURE — 3079F DIAST BP 80-89 MM HG: CPT | Performed by: STUDENT IN AN ORGANIZED HEALTH CARE EDUCATION/TRAINING PROGRAM

## 2025-04-21 PROCEDURE — G2211 COMPLEX E/M VISIT ADD ON: HCPCS | Performed by: STUDENT IN AN ORGANIZED HEALTH CARE EDUCATION/TRAINING PROGRAM

## 2025-04-21 PROCEDURE — 1160F RVW MEDS BY RX/DR IN RCRD: CPT | Performed by: STUDENT IN AN ORGANIZED HEALTH CARE EDUCATION/TRAINING PROGRAM

## 2025-04-21 PROCEDURE — 3074F SYST BP LT 130 MM HG: CPT | Performed by: STUDENT IN AN ORGANIZED HEALTH CARE EDUCATION/TRAINING PROGRAM

## 2025-04-21 PROCEDURE — 99214 OFFICE O/P EST MOD 30 MIN: CPT | Performed by: STUDENT IN AN ORGANIZED HEALTH CARE EDUCATION/TRAINING PROGRAM

## 2025-04-21 PROCEDURE — 1159F MED LIST DOCD IN RCRD: CPT | Performed by: STUDENT IN AN ORGANIZED HEALTH CARE EDUCATION/TRAINING PROGRAM

## 2025-04-21 RX ORDER — LISINOPRIL 10 MG/1
10 TABLET ORAL DAILY
Qty: 90 TABLET | Refills: 3 | Status: SHIPPED | OUTPATIENT
Start: 2025-04-21

## 2025-04-21 RX ORDER — CETIRIZINE HYDROCHLORIDE 10 MG/1
10 TABLET ORAL DAILY
Qty: 90 TABLET | Refills: 3 | Status: SHIPPED | OUTPATIENT
Start: 2025-04-21

## 2025-04-21 RX ORDER — FLUTICASONE PROPIONATE 50 MCG
2 SPRAY, SUSPENSION (ML) NASAL DAILY
Qty: 48 G | Refills: 3 | Status: SHIPPED | OUTPATIENT
Start: 2025-04-21

## 2025-04-21 RX ORDER — METFORMIN HYDROCHLORIDE 500 MG/1
500 TABLET, EXTENDED RELEASE ORAL
Qty: 90 TABLET | Refills: 3 | Status: SHIPPED | OUTPATIENT
Start: 2025-04-21

## 2025-04-21 RX ORDER — LANOLIN ALCOHOL/MO/W.PET/CERES
1000 CREAM (GRAM) TOPICAL DAILY
Qty: 90 TABLET | Refills: 3 | Status: SHIPPED | OUTPATIENT
Start: 2025-04-21

## 2025-04-21 RX ORDER — LEVOTHYROXINE SODIUM 112 UG/1
112 TABLET ORAL DAILY
Qty: 90 TABLET | Refills: 3 | Status: SHIPPED | OUTPATIENT
Start: 2025-04-21

## 2025-04-21 RX ORDER — ATORVASTATIN CALCIUM 80 MG/1
80 TABLET, FILM COATED ORAL NIGHTLY
Qty: 90 TABLET | Refills: 3 | Status: SHIPPED | OUTPATIENT
Start: 2025-04-21

## 2025-04-21 RX ORDER — CHOLECALCIFEROL (VITAMIN D3) 25 MCG
1000 TABLET ORAL DAILY
Qty: 90 TABLET | Refills: 3 | Status: SHIPPED | OUTPATIENT
Start: 2025-04-21

## 2025-04-21 NOTE — PROGRESS NOTES
Chief Complaint  Huma Montano is a 64 y.o. female presenting for Prediabetes.     From [place]. Lived in [place] for many years. , lives by herself - daughter lives with her PRN. She has 3 adult children. 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     Patient has a past medical history of hypertension, hyperlipidemia, RA (f/u rheum), CKD 2-3a, Graves' disease s/p radioactive iodine ablation on Synthroid, prediabetes, MELISSA on CPAP, allergic rhinitis, ADHD (was on Adderall, but psych didn't continue due to other controlled meds).  She has chronic musculoskeletal pain related to multiple injuries and scoliosis, spinal stenosis, s/p spinal surgery (2017, fusion L2-S1), followed by pain management.    History of Present Illness  Patient is here for follow-up.    She is still having a lot of pain related to her lower back shooting down to the posterior thigh and calf, and also to the underside of her foot, but no symptoms of her toes.  She is barely able to sit for 2 hours before she needs to move or reposition.  She is scheduled to see neurosurgeon again for evaluation to consider possible laminectomy.  Unfortunately injections did not work for her.  Otherwise she has been approved for pain stimulator, but needs to be evaluated by neurosurgeon again first.  She continues to follow-up with pain management.    She is supposed to start on Orencia for her rheumatoid arthritis.    Otherwise she did start on Remeron, but started losing more hair, and discontinued a couple weeks ago.  She was able to gain about 10 pounds and she is now happy with her weight.    She is requesting refill on her medications.    The following portions of the patient's history were reviewed and updated as appropriate: allergies, current medications, past family history, past medical history, past social history, past surgical history, and problem  "list.    Objective  /84 (BP Location: Left arm, Patient Position: Sitting, Cuff Size: Adult)   Pulse 60   Temp 98 °F (36.7 °C) (Temporal)   Ht 163.7 cm (64.45\")   Wt 54.4 kg (120 lb)   BMI 20.31 kg/m²     Physical Exam  Constitutional:       Appearance: Normal appearance.   HENT:      Head: Normocephalic and atraumatic.   Eyes:      Extraocular Movements: Extraocular movements intact.      Conjunctiva/sclera: Conjunctivae normal.   Pulmonary:      Effort: Pulmonary effort is normal. No respiratory distress.   Musculoskeletal:      Cervical back: Neck supple.   Neurological:      Mental Status: She is alert and oriented to person, place, and time. Mental status is at baseline.   Psychiatric:         Behavior: Behavior normal.         Thought Content: Thought content normal.         Assessment/Plan   1. Essential hypertension  BP Readings from Last 3 Encounters:   04/21/25 124/84   02/10/25 110/70   12/18/24 108/68   Blood pressure fairly well-controlled.  Continue on current medication.  - lisinopril (PRINIVIL,ZESTRIL) 10 MG tablet; Take 1 tablet by mouth Daily.  Dispense: 90 tablet; Refill: 3    2. Postablative hypothyroidism  Well-controlled.  Continue on Synthroid and recheck before next visit with annual Medicare visit in 6 months  - levothyroxine (SYNTHROID, LEVOTHROID) 112 MCG tablet; Take 1 tablet by mouth Daily.  Dispense: 90 tablet; Refill: 3  - TSH; Future  - T4, Free; Future    3. Stage 3a chronic kidney disease  Kidney function now well within normal.  We will continue to monitor.  - Comprehensive Metabolic Panel; Future    4. Lumbar back pain with radiculopathy affecting right lower extremity  Still active.  Plan for possible laminectomy versus spinal cord stimulator for pain.    5. Rheumatoid arthritis involving multiple sites, unspecified whether rheumatoid factor present  Stable.  Continue follow-up with rheumatology.  She usually does CBC checks with her rheumatologist every 3 months.  I " will order for check in 6 months  - CBC & Differential; Future    6. Mixed hyperlipidemia  Stable.  Continue on atorvastatin 80 mg.  - atorvastatin (LIPITOR) 80 MG tablet; Take 1 tablet by mouth Every Night. At bedtime  Dispense: 90 tablet; Refill: 3  - Lipid Panel; Future    7. Allergic rhinitis, unspecified seasonality, unspecified trigger  Stable.  Continue all current medications  - cetirizine (zyrTEC) 10 MG tablet; Take 1 tablet by mouth Daily.  Dispense: 90 tablet; Refill: 3  - fluticasone (FLONASE) 50 MCG/ACT nasal spray; Administer 2 sprays into the nostril(s) as directed by provider Daily. 1-2 sprays daily  Dispense: 48 g; Refill: 3    8. Vitamin D deficiency  Stable.  Continue on vitamin D.  - cholecalciferol (VITAMIN D3) 25 MCG (1000 UT) tablet; Take 1 tablet by mouth Daily.  Dispense: 90 tablet; Refill: 3    9. Prediabetes  Hemoglobin A1C   Date Value Ref Range Status   02/10/2025 6.1 (A) 4.5 - 5.7 % Final   10/25/2024 5.70 (H) 4.80 - 5.60 % Final   05/24/2024 6.00 (H) 4.80 - 5.60 % Final   08/25/2023 6.00 (H) 4.80 - 5.60 % Final   Slightly worse in February.  Recheck in 6 months.  Continue on metformin  - metFORMIN ER (GLUCOPHAGE-XR) 500 MG 24 hr tablet; Take 1 tablet by mouth Daily With Breakfast.  Dispense: 90 tablet; Refill: 3  - Hemoglobin A1c; Future    10. Low serum vitamin B12  Was low in the past.  Has been maintaining level in the 500s on current dose  - vitamin B-12 (CYANOCOBALAMIN) 1000 MCG tablet; Take 1 tablet by mouth Daily.  Dispense: 90 tablet; Refill: 3    11. Weight loss  Wt Readings from Last 3 Encounters:   04/21/25 54.4 kg (120 lb)   02/10/25 49.4 kg (108 lb 12.8 oz)   12/18/24 52.2 kg (115 lb)   Reassuring weight gain.  Now at healthy weight.  Will continue to monitor.  Will discontinue Remeron due to side effects.      Return in about 6 months (around 10/21/2025) for Medicare Wellness.    Future Appointments           Provider Department Center     7/30/2025 9:30 AM (Arrive by  9:00 AM) Lefty Lawrence MD [facility] GASTROENTEROLOGY [place]     10/21/2025 8:00 AM Morgan Wong MD [facility] INTERNAL MEDICINE [place]     12/22/2025 2:45 PM (Arrive by 2:15 PM) Lefty Lawrence MD [facility] GASTROENTEROLOGY [place]              Morgan Wong MD  Family Medicine  04/21/2025

## 2025-06-09 ENCOUNTER — PATIENT MESSAGE (OUTPATIENT)
Dept: INTERNAL MEDICINE | Facility: CLINIC | Age: 65
End: 2025-06-09
Payer: MEDICARE

## 2025-06-09 DIAGNOSIS — Z11.1 TUBERCULOSIS SCREENING: Primary | ICD-10-CM

## 2025-06-13 ENCOUNTER — LAB (OUTPATIENT)
Dept: LAB | Facility: HOSPITAL | Age: 65
End: 2025-06-13
Payer: MEDICARE

## 2025-06-13 DIAGNOSIS — Z11.1 TUBERCULOSIS SCREENING: ICD-10-CM

## 2025-06-13 PROCEDURE — 86480 TB TEST CELL IMMUN MEASURE: CPT

## 2025-06-16 LAB
GAMMA INTERFERON BACKGROUND BLD IA-ACNC: 0.02 IU/ML
M TB IFN-G BLD-IMP: NEGATIVE
M TB IFN-G CD4+ BCKGRND COR BLD-ACNC: 0.03 IU/ML
M TB IFN-G CD4+CD8+ BCKGRND COR BLD-ACNC: 0.02 IU/ML
MITOGEN IGNF BCKGRD COR BLD-ACNC: >10 IU/ML
SERVICE CMNT-IMP: NORMAL

## 2025-07-30 ENCOUNTER — LAB REQUISITION (OUTPATIENT)
Dept: LAB | Facility: HOSPITAL | Age: 65
End: 2025-07-30
Payer: MEDICARE

## 2025-07-30 ENCOUNTER — OUTSIDE FACILITY SERVICE (OUTPATIENT)
Dept: GASTROENTEROLOGY | Facility: CLINIC | Age: 65
End: 2025-07-30
Payer: MEDICARE

## 2025-07-30 DIAGNOSIS — K31.89 OTHER DISEASES OF STOMACH AND DUODENUM: ICD-10-CM

## 2025-07-30 DIAGNOSIS — R10.13 EPIGASTRIC PAIN: ICD-10-CM

## 2025-07-30 DIAGNOSIS — R63.4 ABNORMAL WEIGHT LOSS: ICD-10-CM

## 2025-07-30 PROCEDURE — 88305 TISSUE EXAM BY PATHOLOGIST: CPT | Performed by: INTERNAL MEDICINE

## 2025-07-30 PROCEDURE — 43239 EGD BIOPSY SINGLE/MULTIPLE: CPT | Performed by: INTERNAL MEDICINE

## 2025-08-01 ENCOUNTER — RESULTS FOLLOW-UP (OUTPATIENT)
Dept: LAB | Facility: HOSPITAL | Age: 65
End: 2025-08-01
Payer: MEDICARE

## 2025-08-01 NOTE — LETTER
"Huma Montano  [address] [place] [address]    August 1, 2025     Dear Ms. Montano:    Below are the results from your recent visit:    Resulted Orders   Tissue Pathology Exam   Result Value Ref Range    Case Report       Surgical Pathology Report                         Case: [id number]                                  Authorizing Provider:  Lefty Lawrence MD    Collected:           07/30/2025 02:20 PM          Ordering Location:     [facility]   Received:            07/30/2025 03:23 PM                                 LABORATORY                                                                   Pathologist:           Matt Penny MD                                                       Specimens:   1) - Gastric, Antrum                                                                                2) - Gastric, Body                                                                         Clinical Information       Other diseases of stomach and duodenum  Abnormal weight loss  Epigastric pain      Final Diagnosis       1.  GASTRIC ANTRUM, BIOPSY:  Mild reactive gastropathy  No H. pylori organisms identified on routine stains  Negative for intestinal metaplasia, dysplasia, or carcinoma    2.  GASTRIC BODY, BIOPSY:  Gastric mucosa with no significant histopathologic abnormality  Negative for intestinal metaplasia, dysplasia, or carcinoma        Gross Description       1. Gastric, Antrum.  Received in formalin labeled \"stomach; gastric antrum\" is a 1 x 0.3 x 0.2 cm aggregate of 2 tan tissue fragments, submitted entirely in a single cassette.  LDP    2. Gastric, Body.  Received in formalin labeled \"stomach; gastric body\" is a 1.3 x 0.2 x 0.1 cm aggregate of 2 tan tissue fragments, submitted entirely in a single cassette.  LDP        Microscopic Description       The slides are reviewed and demonstrate histopathologic features supporting the above rendered diagnosis.           Your " EGD biopsies do not show evidence of a bacterial infection or food allergy condition.  This is good news.  If you continue to have problematic symptoms, please call the GI clinic to schedule an appointment.  It was nice to see you!        If you have any questions or concerns, please don't hesitate to call.         Sincerely,        Lefty Lawrence MD

## (undated) DEVICE — ELECTRD BLD EXT EDGE 1P COAT 6.5IN STRL

## (undated) DEVICE — SOL LR 1000ML

## (undated) DEVICE — PACK,UNIVERSAL,NO GOWNS: Brand: MEDLINE

## (undated) DEVICE — KT FAST STRT ATF120

## (undated) DEVICE — ENCORE® LATEX MICRO SIZE 6.5, STERILE LATEX POWDER-FREE SURGICAL GLOVE: Brand: ENCORE

## (undated) DEVICE — TOOL 14MH30 LEGEND 14CM 3MM: Brand: MIDAS REX ™

## (undated) DEVICE — 100% SILICONE FOLEY TRAY,16 FR/CH (5.3 MM), 5 ML CATHETER PRE-CONNECTED TO 2000 ML DRAINAGE BAG WITH LUER-LOCK SAMPLING AND ADHESIVE CATHETER SECUREMENT: Brand: DOVER

## (undated) DEVICE — APPL CHLORAPREP W/TINT 26ML BLU

## (undated) DEVICE — GOWN,REINF,POLY,ECL,PP SLV,XXL: Brand: MEDLINE

## (undated) DEVICE — 3M™ STERI-DRAPE™ INSTRUMENT POUCH 1018: Brand: STERI-DRAPE™

## (undated) DEVICE — SPNG GZ WOVN 4X4IN 12PLY 10/BX STRL

## (undated) DEVICE — SPHR MARKR STEALTH STATION

## (undated) DEVICE — ENCORE® LATEX MICRO SIZE 7.5, STERILE LATEX POWDER-FREE SURGICAL GLOVE: Brand: ENCORE

## (undated) DEVICE — JACKSON-PRATT 100CC BULB RESERVOIR: Brand: CARDINAL HEALTH

## (undated) DEVICE — BG TRANSF W/COUPLER SPK 600ML

## (undated) DEVICE — ANTIBACTERIAL UNDYED BRAIDED (POLYGLACTIN 910), SYNTHETIC ABSORBABLE SUTURE: Brand: COATED VICRYL

## (undated) DEVICE — POSTN ARM CRDL LAMIN

## (undated) DEVICE — TIBURON SPLIT SHEET: Brand: CONVERTORS

## (undated) DEVICE — BLOOD TRANSFUSION FILTER: Brand: HAEMONETICS

## (undated) DEVICE — C-ARM DRAPE: Brand: DEROYAL

## (undated) DEVICE — SUT ETHLN 3/0 FS1 30IN 669H

## (undated) DEVICE — DEV MEASURING DIRECT FOR 5MM LOCK SCRWS

## (undated) DEVICE — AIRWY 90MM NO9

## (undated) DEVICE — SUT PROLN SURGILENE 5.0 24IN BLU 9702H

## (undated) DEVICE — PROXIMATE RH ROTATING HEAD SKIN STAPLERS (35 WIDE) CONTAINS 35 STAINLESS STEEL STAPLES: Brand: PROXIMATE

## (undated) DEVICE — SOL NACL 0.9PCT 1000ML

## (undated) DEVICE — JP PERF DRN SIL FLT 10MM FULL: Brand: CARDINAL HEALTH

## (undated) DEVICE — FLOSEAL MATRIX IS INDICATED IN SURGICAL PROCEDURES (OTHER THAN IN OPHTHALMIC) AS AN ADJUNCT TO HEMOSTASIS WHEN CONTROL OF BLEEDING BY LIGATURE OR CONVENTIONALPROCEDURES IS INEFFECTIVE OR IMPRACTICAL.: Brand: FLOSEAL HEMOSTATIC MATRIX

## (undated) DEVICE — ACCY PA700 LUBRICANT DIFFUSER MR7 4 PACK: Brand: MIDAS REX

## (undated) DEVICE — TRAP,MUCUS SPECIMEN,40CC: Brand: MEDLINE

## (undated) DEVICE — PK NEURO DISC 10

## (undated) DEVICE — TB SXN FRAZIER 12F STRL

## (undated) DEVICE — DRSNG TELFA PAD NONADH STR 1S 3X8IN

## (undated) DEVICE — SET SCREW 8351500 TSRH 3DX FLUSH BREAK
Type: IMPLANTABLE DEVICE | Site: SPINE LUMBAR | Status: NON-FUNCTIONAL
Brand: TSRH® SPINAL SYSTEM

## (undated) DEVICE — MAYFIELD® DISPOSABLE ADULT SKULL PIN (PLASTIC BASE): Brand: MAYFIELD®

## (undated) DEVICE — 2963 MEDIPORE SOFT CLOTH TAPE 3 IN X 10 YD 12 RLS/CS: Brand: 3M™ MEDIPORE™

## (undated) DEVICE — NEURO SPONGES: Brand: DEROYAL

## (undated) DEVICE — DISPOSABLE IRRIGATION BIPOLAR CORD, M1000 TYPE: Brand: KIRWAN

## (undated) DEVICE — GOWN,NON-REINFORCED,SIRUS,SET IN SLV,XL: Brand: MEDLINE

## (undated) DEVICE — IRRIGATOR BULB ASEPTO 60CC STRL

## (undated) DEVICE — UNDERGLV SURG BIOGEL INDICATOR LF PF 7.5

## (undated) DEVICE — TAPE MICROFM 2IN LF

## (undated) DEVICE — SHEET,DRAPE,40X58,STERILE: Brand: MEDLINE

## (undated) DEVICE — GAMMEX® NON-LATEX SIZE 6.5, STERILE NEOPRENE POWDER-FREE SURGICAL GLOVE: Brand: GAMMEX